# Patient Record
Sex: MALE | Race: BLACK OR AFRICAN AMERICAN | Employment: FULL TIME | URBAN - METROPOLITAN AREA
[De-identification: names, ages, dates, MRNs, and addresses within clinical notes are randomized per-mention and may not be internally consistent; named-entity substitution may affect disease eponyms.]

---

## 2017-01-27 ENCOUNTER — OFFICE VISIT (OUTPATIENT)
Dept: FAMILY MEDICINE CLINIC | Age: 47
End: 2017-01-27

## 2017-01-27 VITALS
SYSTOLIC BLOOD PRESSURE: 128 MMHG | WEIGHT: 233.4 LBS | DIASTOLIC BLOOD PRESSURE: 70 MMHG | RESPIRATION RATE: 16 BRPM | HEART RATE: 80 BPM | HEIGHT: 69 IN | BODY MASS INDEX: 34.57 KG/M2

## 2017-01-27 DIAGNOSIS — F41.9 ANXIETY: ICD-10-CM

## 2017-01-27 DIAGNOSIS — I50.9 CHF (NYHA CLASS III, ACC/AHA STAGE C) (HCC): ICD-10-CM

## 2017-01-27 DIAGNOSIS — I48.19 PERSISTENT ATRIAL FIBRILLATION (HCC): Primary | ICD-10-CM

## 2017-01-27 PROCEDURE — 99213 OFFICE O/P EST LOW 20 MIN: CPT | Performed by: FAMILY MEDICINE

## 2017-01-27 RX ORDER — HYDROXYZINE PAMOATE 25 MG/1
25 CAPSULE ORAL 3 TIMES DAILY PRN
Qty: 60 CAPSULE | Refills: 0 | Status: SHIPPED | OUTPATIENT
Start: 2017-01-27 | End: 2018-02-12 | Stop reason: SDUPTHER

## 2017-01-27 ASSESSMENT — ENCOUNTER SYMPTOMS
GASTROINTESTINAL NEGATIVE: 1
RESPIRATORY NEGATIVE: 1

## 2017-02-24 ENCOUNTER — OFFICE VISIT (OUTPATIENT)
Dept: FAMILY MEDICINE CLINIC | Age: 47
End: 2017-02-24

## 2017-02-24 VITALS
OXYGEN SATURATION: 98 % | DIASTOLIC BLOOD PRESSURE: 72 MMHG | HEIGHT: 69 IN | SYSTOLIC BLOOD PRESSURE: 108 MMHG | BODY MASS INDEX: 34.42 KG/M2 | HEART RATE: 56 BPM | RESPIRATION RATE: 12 BRPM | WEIGHT: 232.4 LBS

## 2017-02-24 DIAGNOSIS — F41.9 ANXIETY: Primary | ICD-10-CM

## 2017-02-24 DIAGNOSIS — I48.91 ATRIAL FIBRILLATION, UNSPECIFIED TYPE (HCC): ICD-10-CM

## 2017-02-24 PROCEDURE — 99213 OFFICE O/P EST LOW 20 MIN: CPT | Performed by: FAMILY MEDICINE

## 2017-02-27 ASSESSMENT — ENCOUNTER SYMPTOMS
RESPIRATORY NEGATIVE: 1
GASTROINTESTINAL NEGATIVE: 1

## 2017-02-28 ENCOUNTER — TELEPHONE (OUTPATIENT)
Dept: FAMILY MEDICINE CLINIC | Age: 47
End: 2017-02-28

## 2017-05-16 ENCOUNTER — OFFICE VISIT (OUTPATIENT)
Dept: FAMILY MEDICINE CLINIC | Age: 47
End: 2017-05-16

## 2017-05-16 VITALS
RESPIRATION RATE: 12 BRPM | DIASTOLIC BLOOD PRESSURE: 62 MMHG | HEIGHT: 69 IN | TEMPERATURE: 98.2 F | OXYGEN SATURATION: 98 % | WEIGHT: 224.9 LBS | HEART RATE: 93 BPM | SYSTOLIC BLOOD PRESSURE: 114 MMHG | BODY MASS INDEX: 33.31 KG/M2

## 2017-05-16 DIAGNOSIS — Z13.1 ENCOUNTER FOR SCREENING FOR DIABETES MELLITUS: ICD-10-CM

## 2017-05-16 DIAGNOSIS — K40.90 LEFT INGUINAL HERNIA: ICD-10-CM

## 2017-05-16 DIAGNOSIS — J00 NASOPHARYNGITIS: Primary | ICD-10-CM

## 2017-05-16 PROCEDURE — 99213 OFFICE O/P EST LOW 20 MIN: CPT | Performed by: NURSE PRACTITIONER

## 2017-05-17 ASSESSMENT — ENCOUNTER SYMPTOMS
SINUS PRESSURE: 0
RHINORRHEA: 1
CHEST TIGHTNESS: 0
ABDOMINAL PAIN: 0
SHORTNESS OF BREATH: 0
NAUSEA: 0
SORE THROAT: 1
COUGH: 0

## 2017-06-08 ENCOUNTER — TELEPHONE (OUTPATIENT)
Dept: FAMILY MEDICINE CLINIC | Age: 47
End: 2017-06-08

## 2017-06-08 DIAGNOSIS — L25.5 RHUS DERMATITIS: Primary | ICD-10-CM

## 2017-06-08 RX ORDER — PREDNISONE 20 MG/1
TABLET ORAL
Qty: 21 TABLET | Refills: 0 | Status: SHIPPED | OUTPATIENT
Start: 2017-06-08 | End: 2017-07-28

## 2017-07-28 ENCOUNTER — HOSPITAL ENCOUNTER (EMERGENCY)
Age: 47
Discharge: HOME | End: 2017-07-28
Payer: MEDICARE

## 2017-07-28 VITALS
RESPIRATION RATE: 16 BRPM | HEIGHT: 69 IN | TEMPERATURE: 98.7 F | BODY MASS INDEX: 34.36 KG/M2 | OXYGEN SATURATION: 98 % | WEIGHT: 232 LBS | HEART RATE: 76 BPM | DIASTOLIC BLOOD PRESSURE: 71 MMHG | SYSTOLIC BLOOD PRESSURE: 144 MMHG

## 2017-07-28 DIAGNOSIS — J06.9 URI WITH COUGH AND CONGESTION: Primary | ICD-10-CM

## 2017-07-28 PROCEDURE — 99212 OFFICE O/P EST SF 10 MIN: CPT

## 2017-07-28 PROCEDURE — 99213 OFFICE O/P EST LOW 20 MIN: CPT | Performed by: NURSE PRACTITIONER

## 2017-07-28 RX ORDER — AMOXICILLIN AND CLAVULANATE POTASSIUM 500; 125 MG/1; MG/1
1 TABLET, FILM COATED ORAL 2 TIMES DAILY
Qty: 14 TABLET | Refills: 0 | Status: SHIPPED | OUTPATIENT
Start: 2017-07-28 | End: 2017-08-04

## 2017-07-28 RX ORDER — PREDNISONE 20 MG/1
20 TABLET ORAL 2 TIMES DAILY
Qty: 10 TABLET | Refills: 0 | Status: SHIPPED | OUTPATIENT
Start: 2017-07-28 | End: 2017-08-02

## 2017-07-28 ASSESSMENT — ENCOUNTER SYMPTOMS
DIARRHEA: 0
EYE DISCHARGE: 0
VOMITING: 0
STRIDOR: 0
BACK PAIN: 0
SHORTNESS OF BREATH: 0
RHINORRHEA: 0
ABDOMINAL PAIN: 0
COUGH: 1
NAUSEA: 0
SORE THROAT: 0
CONSTIPATION: 0
EYE PAIN: 0
WHEEZING: 0
COLOR CHANGE: 0

## 2017-09-13 ENCOUNTER — TELEPHONE (OUTPATIENT)
Dept: FAMILY MEDICINE CLINIC | Age: 47
End: 2017-09-13

## 2018-02-12 ENCOUNTER — OFFICE VISIT (OUTPATIENT)
Dept: FAMILY MEDICINE CLINIC | Age: 48
End: 2018-02-12
Payer: MEDICARE

## 2018-02-12 VITALS
HEART RATE: 76 BPM | BODY MASS INDEX: 36.11 KG/M2 | DIASTOLIC BLOOD PRESSURE: 70 MMHG | SYSTOLIC BLOOD PRESSURE: 122 MMHG | RESPIRATION RATE: 16 BRPM | HEIGHT: 69 IN | WEIGHT: 243.8 LBS

## 2018-02-12 DIAGNOSIS — Z23 INFLUENZA VACCINE NEEDED: ICD-10-CM

## 2018-02-12 DIAGNOSIS — I50.9 CHF (NYHA CLASS III, ACC/AHA STAGE C) (HCC): ICD-10-CM

## 2018-02-12 DIAGNOSIS — Z00.00 WELL ADULT EXAM: Primary | ICD-10-CM

## 2018-02-12 DIAGNOSIS — I34.0 NON-RHEUMATIC MITRAL REGURGITATION: ICD-10-CM

## 2018-02-12 DIAGNOSIS — Z13.1 ENCOUNTER FOR SCREENING FOR DIABETES MELLITUS: ICD-10-CM

## 2018-02-12 DIAGNOSIS — F41.9 ANXIETY: ICD-10-CM

## 2018-02-12 PROCEDURE — 90688 IIV4 VACCINE SPLT 0.5 ML IM: CPT | Performed by: NURSE PRACTITIONER

## 2018-02-12 PROCEDURE — 90471 IMMUNIZATION ADMIN: CPT | Performed by: NURSE PRACTITIONER

## 2018-02-12 PROCEDURE — 99396 PREV VISIT EST AGE 40-64: CPT | Performed by: NURSE PRACTITIONER

## 2018-02-12 RX ORDER — HYDROXYZINE PAMOATE 25 MG/1
25 CAPSULE ORAL 3 TIMES DAILY PRN
Qty: 60 CAPSULE | Refills: 2 | Status: SHIPPED | OUTPATIENT
Start: 2018-02-12 | End: 2019-04-06 | Stop reason: SDUPTHER

## 2018-02-12 ASSESSMENT — PATIENT HEALTH QUESTIONNAIRE - PHQ9
SUM OF ALL RESPONSES TO PHQ QUESTIONS 1-9: 0
2. FEELING DOWN, DEPRESSED OR HOPELESS: 0
SUM OF ALL RESPONSES TO PHQ9 QUESTIONS 1 & 2: 0
1. LITTLE INTEREST OR PLEASURE IN DOING THINGS: 0

## 2018-02-12 ASSESSMENT — ENCOUNTER SYMPTOMS
COUGH: 0
ABDOMINAL PAIN: 0
SHORTNESS OF BREATH: 0
NAUSEA: 0

## 2018-02-12 NOTE — PROGRESS NOTES
then come off completely  Hydroxyzine PRN  Screening labs through [facility] discussed  FLU in office  RTO in 1 year

## 2018-03-17 ENCOUNTER — HOSPITAL ENCOUNTER (OUTPATIENT)
Age: 48
Discharge: HOME | End: 2018-03-17
Payer: MEDICARE

## 2018-03-17 LAB
ALBUMIN SERPL-MCNC: 4.8 G/DL (ref 3.5–5.1)
ALP BLD-CCNC: 99 U/L (ref 38–126)
ALT SERPL-CCNC: 21 U/L (ref 11–66)
ANION GAP SERPL CALCULATED.3IONS-SCNC: 14 MEQ/L (ref 8–16)
AST SERPL-CCNC: 19 U/L (ref 5–40)
BILIRUB SERPL-MCNC: 0.5 MG/DL (ref 0.3–1.2)
BILIRUBIN DIRECT: < 0.2 MG/DL (ref 0–0.3)
BUN BLDV-MCNC: 10 MG/DL (ref 7–22)
CALCIUM SERPL-MCNC: 10 MG/DL (ref 8.5–10.5)
CHLORIDE BLD-SCNC: 101 MEQ/L (ref 98–111)
CHOLESTEROL, TOTAL: 135 MG/DL (ref 100–199)
CO2: 26 MEQ/L (ref 23–33)
CREAT SERPL-MCNC: 0.9 MG/DL (ref 0.4–1.2)
GFR SERPL CREATININE-BSD FRML MDRD: > 90 ML/MIN/1.73M2
GLUCOSE BLD-MCNC: 101 MG/DL (ref 70–108)
HDLC SERPL-MCNC: 54 MG/DL
LDL CHOLESTEROL CALCULATED: 66 MG/DL
POTASSIUM SERPL-SCNC: 4.6 MEQ/L (ref 3.5–5.2)
SODIUM BLD-SCNC: 141 MEQ/L (ref 135–145)
TOTAL PROTEIN: 8.1 G/DL (ref 6.1–8)
TRIGL SERPL-MCNC: 73 MG/DL (ref 0–199)

## 2018-03-17 PROCEDURE — 36415 COLL VENOUS BLD VENIPUNCTURE: CPT

## 2018-03-17 PROCEDURE — 82248 BILIRUBIN DIRECT: CPT

## 2018-03-17 PROCEDURE — 80053 COMPREHEN METABOLIC PANEL: CPT

## 2018-03-17 PROCEDURE — 80061 LIPID PANEL: CPT

## 2019-04-06 ENCOUNTER — HOSPITAL ENCOUNTER (EMERGENCY)
Age: 49
Discharge: HOME | End: 2019-04-06
Payer: COMMERCIAL

## 2019-04-06 VITALS
OXYGEN SATURATION: 97 % | SYSTOLIC BLOOD PRESSURE: 100 MMHG | WEIGHT: 230 LBS | HEART RATE: 96 BPM | HEIGHT: 69 IN | BODY MASS INDEX: 34.07 KG/M2 | TEMPERATURE: 98.4 F | RESPIRATION RATE: 19 BRPM | DIASTOLIC BLOOD PRESSURE: 71 MMHG

## 2019-04-06 DIAGNOSIS — F41.9 ANXIETY: ICD-10-CM

## 2019-04-06 DIAGNOSIS — I48.0 PAROXYSMAL ATRIAL FIBRILLATION (HCC): Primary | ICD-10-CM

## 2019-04-06 LAB
ALBUMIN SERPL-MCNC: 4.2 G/DL (ref 3.5–5.1)
ALP BLD-CCNC: 75 U/L (ref 38–126)
ALT SERPL-CCNC: 27 U/L (ref 11–66)
ANION GAP SERPL CALCULATED.3IONS-SCNC: 12 MEQ/L (ref 8–16)
AST SERPL-CCNC: 22 U/L (ref 5–40)
BASOPHILS # BLD: 1 %
BASOPHILS ABSOLUTE: 0.1 THOU/MM3 (ref 0–0.1)
BILIRUB SERPL-MCNC: 0.4 MG/DL (ref 0.3–1.2)
BUN BLDV-MCNC: 19 MG/DL (ref 7–22)
CALCIUM SERPL-MCNC: 9 MG/DL (ref 8.5–10.5)
CHLORIDE BLD-SCNC: 106 MEQ/L (ref 98–111)
CO2: 22 MEQ/L (ref 23–33)
CREAT SERPL-MCNC: 0.9 MG/DL (ref 0.4–1.2)
EKG ATRIAL RATE: 84 BPM
EKG Q-T INTERVAL: 324 MS
EKG QRS DURATION: 74 MS
EKG QTC CALCULATION (BAZETT): 446 MS
EKG R AXIS: -17 DEGREES
EKG T AXIS: 6 DEGREES
EKG VENTRICULAR RATE: 114 BPM
EOSINOPHIL # BLD: 1.8 %
EOSINOPHILS ABSOLUTE: 0.1 THOU/MM3 (ref 0–0.4)
ERYTHROCYTE [DISTWIDTH] IN BLOOD BY AUTOMATED COUNT: 12.9 % (ref 11.5–14.5)
ERYTHROCYTE [DISTWIDTH] IN BLOOD BY AUTOMATED COUNT: 43.1 FL (ref 35–45)
GFR SERPL CREATININE-BSD FRML MDRD: > 90 ML/MIN/1.73M2
GLUCOSE BLD-MCNC: 99 MG/DL (ref 70–108)
HCT VFR BLD CALC: 46.9 % (ref 42–52)
HEMOGLOBIN: 15.9 GM/DL (ref 14–18)
IMMATURE GRANS (ABS): 0.02 THOU/MM3 (ref 0–0.07)
IMMATURE GRANULOCYTES: 0.3 %
LYMPHOCYTES # BLD: 25.3 %
LYMPHOCYTES ABSOLUTE: 2 THOU/MM3 (ref 1–4.8)
MCH RBC QN AUTO: 31 PG (ref 26–33)
MCHC RBC AUTO-ENTMCNC: 33.9 GM/DL (ref 32.2–35.5)
MCV RBC AUTO: 91.4 FL (ref 80–94)
MONOCYTES # BLD: 6.5 %
MONOCYTES ABSOLUTE: 0.5 THOU/MM3 (ref 0.4–1.3)
NUCLEATED RED BLOOD CELLS: 0 /100 WBC
OSMOLALITY CALCULATION: 281.7 MOSMOL/KG (ref 275–300)
PLATELET # BLD: 304 THOU/MM3 (ref 130–400)
PMV BLD AUTO: 9.9 FL (ref 9.4–12.4)
POTASSIUM SERPL-SCNC: 4.8 MEQ/L (ref 3.5–5.2)
PRO-BNP: 209.4 PG/ML (ref 0–450)
RBC # BLD: 5.13 MILL/MM3 (ref 4.7–6.1)
SEG NEUTROPHILS: 65.1 %
SEGMENTED NEUTROPHILS ABSOLUTE COUNT: 5.1 THOU/MM3 (ref 1.8–7.7)
SODIUM BLD-SCNC: 140 MEQ/L (ref 135–145)
TOTAL PROTEIN: 7.2 G/DL (ref 6.1–8)
TSH SERPL DL<=0.05 MIU/L-ACNC: 2.76 UIU/ML (ref 0.4–4.2)
WBC # BLD: 7.8 THOU/MM3 (ref 4.8–10.8)

## 2019-04-06 PROCEDURE — 93010 ELECTROCARDIOGRAM REPORT: CPT | Performed by: INTERNAL MEDICINE

## 2019-04-06 PROCEDURE — 99285 EMERGENCY DEPT VISIT HI MDM: CPT

## 2019-04-06 PROCEDURE — 83880 ASSAY OF NATRIURETIC PEPTIDE: CPT

## 2019-04-06 PROCEDURE — 36415 COLL VENOUS BLD VENIPUNCTURE: CPT

## 2019-04-06 PROCEDURE — 85025 COMPLETE CBC W/AUTO DIFF WBC: CPT

## 2019-04-06 PROCEDURE — 6370000000 HC RX 637 (ALT 250 FOR IP): Performed by: EMERGENCY MEDICINE

## 2019-04-06 PROCEDURE — 80053 COMPREHEN METABOLIC PANEL: CPT

## 2019-04-06 PROCEDURE — 93005 ELECTROCARDIOGRAM TRACING: CPT | Performed by: EMERGENCY MEDICINE

## 2019-04-06 PROCEDURE — 84443 ASSAY THYROID STIM HORMONE: CPT

## 2019-04-06 RX ORDER — HYDROXYZINE PAMOATE 25 MG/1
25 CAPSULE ORAL 3 TIMES DAILY PRN
Qty: 60 CAPSULE | Refills: 0 | Status: SHIPPED | OUTPATIENT
Start: 2019-04-06 | End: 2019-04-17 | Stop reason: SDUPTHER

## 2019-04-06 RX ORDER — DILTIAZEM HYDROCHLORIDE 120 MG/1
120 CAPSULE, COATED, EXTENDED RELEASE ORAL DAILY
Status: DISCONTINUED | OUTPATIENT
Start: 2019-04-06 | End: 2019-04-06 | Stop reason: HOSPADM

## 2019-04-06 RX ADMIN — DILTIAZEM HYDROCHLORIDE 120 MG: 120 CAPSULE, EXTENDED RELEASE ORAL at 10:29

## 2019-04-06 ASSESSMENT — ENCOUNTER SYMPTOMS
DIARRHEA: 0
BACK PAIN: 0
SHORTNESS OF BREATH: 0
WHEEZING: 0
ABDOMINAL PAIN: 0
CONSTIPATION: 0
VOMITING: 0
RHINORRHEA: 0
BLOOD IN STOOL: 0
SORE THROAT: 0
NAUSEA: 0
COUGH: 0

## 2019-04-06 NOTE — ED PROVIDER NOTES
4/6/2019 10:27 AM      CONTINUE these medications which have NOT CHANGED    Details   ALBUTEROL IN Inhale into the lungsHistorical Med      BENZONATATE PO Take by mouthHistorical Med      losartan (COZAAR) 25 MG tablet Take 50 mg by mouth daily Historical Med      aspirin 81 MG chewable tablet Take 1 tablet by mouth daily, Disp-30 tablet, R-3      nitroGLYCERIN (NITROSTAT) 0.4 MG SL tablet Dissolve 1 tablet under tongue for chest pain and repeat every 5 min up to max of 3 total doses. If no relief after 3 doses call 911, Disp-25 tablet, R-3      metoprolol tartrate (LOPRESSOR) 50 MG tablet Take 1 tablet by mouth 2 times daily, Disp-60 tablet, R-3      potassium chloride (KLOR-CON M) 20 MEQ extended release tablet Take 1 tablet by mouth daily, Disp-60 tablet, R-3      diltiazem (CARDIZEM) 90 MG tablet Take 2 tablets by mouth daily, Disp-120 tablet, R-3      bumetanide (BUMEX) 2 MG tablet Take 1 tablet by mouth daily, Disp-30 tablet, R-3             ALLERGIES     has No Known Allergies. FAMILY HISTORY      indicated that the status of his father is unknown. He indicated that the status of his maternal grandmother is unknown. He indicated that the status of his maternal aunt is unknown.   family history includes Cancer in his maternal aunt; Heart Disease in his father; Heart Failure in his maternal grandmother. SOCIAL HISTORY      reports that he has never smoked. He has never used smokeless tobacco. He reports that he drinks alcohol. He reports that he has current or past drug history. Drug: Marijuana. PHYSICAL EXAM     INITIAL VITALS:  height is 5' 9\" (1.753 m) and weight is 230 lb (104.3 kg). His oral temperature is 98.4 °F (36.9 °C). His blood pressure is 100/71 and his pulse is 96. His respiration is 19 and oxygen saturation is 97%. Physical Exam   Constitutional: He is oriented to person, place, and time. He appears well-developed and well-nourished. HENT:   Head: Normocephalic and atraumatic. the ED for an irregular heart rate. Patient presented to the ED in no acute distress. He was placed on a cardiac monitor and an EKG was completed without ischemic changes but did show a-fib. Patient was in a controlled rate while in the ED. He appeared in no apparent distress. No chest pain or shortness of breath. Labs were completed and reassuring. I consulted Dr. Patsy Flores (Cardiology) who advised starting the patient on Eliquis and 81mg ASA, also requested that I make sure the patient is taking his Cardizem and metoprolol. Patient was updated on consult with Dr. Patsy Flores. He has not been taking his cardizem but has it at home and will restart it. Patient was instructed to follow up with Dr. Patsy Flores (Cardiology) next week for recheck. The patient was amenable with all discharge and follow up instructions. All questions were addressed and answered. Return precautions were given for new or worsening symptoms. CRITICAL CARE:   None      CONSULTS:  Dr. Patsy Flores (Cardiology)    PROCEDURES:  None    FINAL IMPRESSION     1. Paroxysmal atrial fibrillation (HCC)    2. Anxiety          DISPOSITION/PLAN   Discharged     PATIENT REFERRED TO:  [facility], [address] [place] [address]  [phone]      As needed    Duyen Figueroa, [address] [place] [address]     Schedule an appointment as soon as possible for a visit on 4/8/2019        DISCHARGE MEDICATIONS:  Discharge Medication List as of 4/6/2019 10:27 AM      START taking these medications    Details   apixaban (ELIQUIS) 5 MG TABS tablet Take 1 tablet by mouth 2 times daily, Disp-60 tablet, R-0Print             (Please note that portions of this note were completed with a voice recognition program.Efforts weremade to edit the dictations but occasionally words are mis-transcribed.)    The patient was given an opportunity to see the EmergencyAttending.  Thepatient voiced understanding that I was a Mid-Level Provider and was in agreement with being seen independently by myself. Scribe:  Michelle Velásquez 4/6/19 9:35 AM Scribing for and in thepresence of JOSE Boston. Signed by: Angela Felder, 04/06/19 12:47 PM    Provider:  I personally performed the services described in the documentation, reviewed and edited the documentation which was dictated to the scribe in my presence, and it accurately records my wordsand actions.     Radha Andres CNP 4/6/19 12:47 PM       ISABEL Cristina - JOSE  04/06/19 9458

## 2019-04-06 NOTE — ED NOTES
Patient presents to the ED with complaint of 'not feeling right'. Patient reports that his heart felt irregular and checked his pulse this morning and it felt irregular by palpitation. Patient states he has history of afib and was supposed to be cardioverted in 2017 but then converted on his own. Denies SOB, nausea or emesis. EKG obtained and monitor applied.       Ashley Galvin RN  04/06/19 5515

## 2019-04-06 NOTE — ED NOTES
Awaiting medication for discharge. Patient updated on plan of care with verbalized understanding.       Deirdre Russ RN  04/06/19 6550

## 2019-04-08 ENCOUNTER — TELEPHONE (OUTPATIENT)
Dept: FAMILY MEDICINE CLINIC | Age: 49
End: 2019-04-08

## 2019-04-17 ENCOUNTER — OFFICE VISIT (OUTPATIENT)
Dept: FAMILY MEDICINE CLINIC | Age: 49
End: 2019-04-17
Payer: COMMERCIAL

## 2019-04-17 VITALS
HEIGHT: 69 IN | BODY MASS INDEX: 34.39 KG/M2 | SYSTOLIC BLOOD PRESSURE: 124 MMHG | HEART RATE: 76 BPM | RESPIRATION RATE: 16 BRPM | WEIGHT: 232.2 LBS | DIASTOLIC BLOOD PRESSURE: 70 MMHG

## 2019-04-17 DIAGNOSIS — I48.0 PAROXYSMAL ATRIAL FIBRILLATION (HCC): Primary | ICD-10-CM

## 2019-04-17 DIAGNOSIS — I50.9 CHF (NYHA CLASS III, ACC/AHA STAGE C) (HCC): ICD-10-CM

## 2019-04-17 DIAGNOSIS — I34.0 NON-RHEUMATIC MITRAL REGURGITATION: ICD-10-CM

## 2019-04-17 DIAGNOSIS — F41.9 ANXIETY: ICD-10-CM

## 2019-04-17 PROCEDURE — 99214 OFFICE O/P EST MOD 30 MIN: CPT | Performed by: FAMILY MEDICINE

## 2019-04-17 RX ORDER — HYDROXYZINE PAMOATE 25 MG/1
25 CAPSULE ORAL 3 TIMES DAILY PRN
Qty: 60 CAPSULE | Refills: 2 | Status: ON HOLD | OUTPATIENT
Start: 2019-04-17 | End: 2021-11-24 | Stop reason: ALTCHOICE

## 2019-04-17 ASSESSMENT — ENCOUNTER SYMPTOMS
RESPIRATORY NEGATIVE: 1
GASTROINTESTINAL NEGATIVE: 1

## 2019-04-17 ASSESSMENT — PATIENT HEALTH QUESTIONNAIRE - PHQ9
2. FEELING DOWN, DEPRESSED OR HOPELESS: 0
SUM OF ALL RESPONSES TO PHQ QUESTIONS 1-9: 0
SUM OF ALL RESPONSES TO PHQ9 QUESTIONS 1 & 2: 0
SUM OF ALL RESPONSES TO PHQ QUESTIONS 1-9: 0
1. LITTLE INTEREST OR PLEASURE IN DOING THINGS: 0

## 2019-04-17 NOTE — PROGRESS NOTES
Visit Information    Have you changed or started any medications since your last visit including any over-the-counter medicines, vitamins, or herbal medicines? no   Are you having any side effects from any of your medications? -  no  Have you stopped taking any of your medications? Is so, why? -  no    Have you seen any other physician or provider since your last visit? Yes - Records Obtained  Have you had any other diagnostic tests since your last visit? Yes - Records Obtained  Have you been seen in the emergency room and/or had an admission to a hospital since we last saw you? Yes - Records Obtained  Have you had your routine dental cleaning in the past 6 months? no    Have you activated your [employer] account? If not, what are your barriers?  Yes     Patient Care Team:  Megan Crawley DO as PCP - General (Family Medicine)  ISABEL Vann - CNP as PCP - [facility] Attributed Provider  Daniel Chisholm MD as Consulting Physician (Cardiology)    Medical History Review  Past Medical, Family, and Social History reviewed and does contribute to the patient presenting condition    Health Maintenance   Topic Date Due    Pneumococcal 0-64 years Vaccine (1 of 1 - PPSV23) 08/13/1978    HIV screen  08/13/1987    DTaP/Tdap/Td vaccine (1 - Tdap) 08/13/1991    Flu vaccine (Season Ended) 09/01/2019    Potassium monitoring  04/06/2020    Creatinine monitoring  04/06/2020    Lipid screen  03/17/2023

## 2019-04-17 NOTE — PROGRESS NOTES
Subjective:      Patient ID: Nimisha Armstrong is a 57 y.o. male. HPI:    Chief Complaint   Patient presents with    ED Follow-up    Atrial Fibrillation     seeing Dr Chandler Kaur today for follow-up    Anxiety     med refill     ED follow up. Was seen in the ED on 4/6/19. MDM:  The patient was seen and evaluated in the ED for an irregular heart rate. Patient presented to the ED in no acute distress. He was placed on a cardiac monitor and an EKG was completed without ischemic changes but did show a-fib. Patient was in a controlled rate while in the ED. He appeared in no apparent distress. No chest pain or shortness of breath. Labs were completed and reassuring. I consulted Dr. Chandler Kaur (Cardiology) who advised starting the patient on Eliquis and 81mg ASA, also requested that I make sure the patient is taking his Cardizem and metoprolol. Patient was updated on consult with Dr. Chandler Kaur. He has not been taking his cardizem but has it at home and will restart it. Patient was instructed to follow up with Dr. Chandler Kaur (Cardiology) next week for recheck. The patient was amenable with all discharge and follow up instructions. All questions were addressed and answered. Return precautions were given for new or worsening symptoms. Pt was off his Cardizem for the last few months, this was stopped Dr. Chandler Kaur. Has f/u with Dr. Chandler Kaur later today. BP looks good today. BP Readings from Last 3 Encounters:   04/17/19 124/70   04/06/19 100/71   02/12/18 122/70     Down 10 lbs from last year. Hx of CHF, Bumex prn. Wt Readings from Last 3 Encounters:   04/17/19 232 lb 3.2 oz (105.3 kg)   04/06/19 230 lb (104.3 kg)   02/12/18 243 lb 12.8 oz (110.6 kg)     Hx of anxiety, would like have refill Atarax to take prn.       Patient Active Problem List   Diagnosis    Persistent atrial fibrillation (HCC)    Episodic headache    Cerebrovascular accident (CVA) due to embolism of left middle cerebral artery (HCC)    CHF (NYHA class III, ACC/AHA stage C) ([facility]    MR (mitral regurgitation)     Past Surgical History:   Procedure Laterality Date    TONSILLECTOMY       Prior to Admission medications    Medication Sig Start Date End Date Taking? Authorizing Provider   apixaban (ELIQUIS) 5 MG TABS tablet Take 1 tablet by mouth 2 times daily 4/6/19  Yes Hosie Spurling, APRN - CNP   hydrOXYzine (VISTARIL) 25 MG capsule Take 1 capsule by mouth 3 times daily as needed for Anxiety 4/6/19  Yes Hosie Spurling, APRN - CNP   losartan (COZAAR) 25 MG tablet Take 50 mg by mouth daily    Yes Historical Provider, MD   aspirin 81 MG chewable tablet Take 1 tablet by mouth daily 11/22/16  Yes Maria Gaviria MD   nitroGLYCERIN (NITROSTAT) 0.4 MG SL tablet Dissolve 1 tablet under tongue for chest pain and repeat every 5 min up to max of 3 total doses. If no relief after 3 doses call 911 11/22/16  Yes Maria Gaviria MD   metoprolol tartrate (LOPRESSOR) 50 MG tablet Take 1 tablet by mouth 2 times daily 11/22/16  Yes Maria Gaviria MD   diltiazem (CARDIZEM) 90 MG tablet Take 2 tablets by mouth daily 11/22/16  Yes Maria Gaviria MD   potassium chloride (KLOR-CON M) 20 MEQ extended release tablet Take 1 tablet by mouth daily 11/22/16  Yes Maria Gaviria MD   bumetanide (BUMEX) 2 MG tablet Take 1 tablet by mouth daily 11/22/16   Maria Gaviria MD         Review of Systems   Constitutional: Negative. HENT: Negative. Respiratory: Negative. Cardiovascular: Negative. Gastrointestinal: Negative. Musculoskeletal: Negative. All other systems reviewed and are negative. Objective:   Physical Exam   Constitutional: He is oriented to person, place, and time. He appears well-developed and well-nourished. HENT:   Head: Normocephalic and atraumatic. Right Ear: Tympanic membrane normal.   Left Ear: Tympanic membrane normal.   Mouth/Throat: Oropharynx is clear and moist and mucous membranes are normal.   Neck: Carotid bruit is not present. Cardiovascular: Normal rate and normal heart sounds. An irregularly irregular rhythm present. No murmur heard. Pulmonary/Chest: Effort normal and breath sounds normal.   Abdominal: Soft. Bowel sounds are normal.   Musculoskeletal: He exhibits no edema. Neurological: He is alert and oriented to person, place, and time. Skin: Skin is warm and dry. Psychiatric: He has a normal mood and affect. His behavior is normal.   Nursing note and vitals reviewed. Assessment:       Diagnosis Orders   1. Paroxysmal atrial fibrillation (HCC)     2. Anxiety  hydrOXYzine (VISTARIL) 25 MG capsule   3. CHF (NYHA class III, ACC/AHA stage C) ([facility]     4. Non-rheumatic mitral regurgitation             Plan:      -  ED reports reviewed  -  Chronic medical problems stable  -  Continue current medications  -  Follow up with specialists as scheduled, has appt with Dr. Edna Hunt later today  -  Refill hydroxyzine to take prn  -  RTO prn    Quality & Risk Score Accuracy    Visit Dx:  I50.9 - CHF (NYHA class III, ACC/AHA stage C) ([facility])  Assessment and plan:  Stable based upon symptoms and exam. Continue current treatment plan and follow up at least yearly. Visit Dx:  I48.1 - Persistent atrial fibrillation (HCC)  Assessment and plan:  Stable based upon symptoms and exam. Continue current treatment plan and follow up at least yearly.   Last edited 04/17/19 08:37 EDT by Yunior Roca, DO Yunior Roca DO

## 2019-04-18 RX ORDER — ATORVASTATIN CALCIUM 40 MG/1
40 TABLET, FILM COATED ORAL DAILY
Status: ON HOLD | COMMUNITY
End: 2021-11-24

## 2019-04-18 NOTE — PROGRESS NOTES
NPO after midnight  [employer] and drivers license  Wear comfortable clean clothing  Do not bring jewelry  Shower night before and morning of surgery with a liquid antibacterial soap  Bring  medication  Follow all instructions given by your physician   needed at discharge  Call [phone] for any questions

## 2019-04-19 ENCOUNTER — HOSPITAL ENCOUNTER (OUTPATIENT)
Dept: INPATIENT UNIT | Age: 49
Discharge: HOME | End: 2019-04-19
Attending: INTERNAL MEDICINE | Admitting: INTERNAL MEDICINE
Payer: COMMERCIAL

## 2019-04-19 VITALS
HEIGHT: 69 IN | BODY MASS INDEX: 34.36 KG/M2 | TEMPERATURE: 98.3 F | WEIGHT: 232 LBS | RESPIRATION RATE: 14 BRPM | OXYGEN SATURATION: 95 % | DIASTOLIC BLOOD PRESSURE: 63 MMHG | HEART RATE: 68 BPM | SYSTOLIC BLOOD PRESSURE: 94 MMHG

## 2019-04-19 LAB
ALBUMIN SERPL-MCNC: 4 G/DL (ref 3.5–5.1)
ALP BLD-CCNC: 72 U/L (ref 38–126)
ALT SERPL-CCNC: 27 U/L (ref 11–66)
ANION GAP SERPL CALCULATED.3IONS-SCNC: 11 MEQ/L (ref 8–16)
AST SERPL-CCNC: 18 U/L (ref 5–40)
BILIRUB SERPL-MCNC: 0.4 MG/DL (ref 0.3–1.2)
BUN BLDV-MCNC: 17 MG/DL (ref 7–22)
CALCIUM SERPL-MCNC: 8.7 MG/DL (ref 8.5–10.5)
CHLORIDE BLD-SCNC: 106 MEQ/L (ref 98–111)
CO2: 23 MEQ/L (ref 23–33)
CREAT SERPL-MCNC: 1 MG/DL (ref 0.4–1.2)
EKG ATRIAL RATE: 45 BPM
EKG ATRIAL RATE: 60 BPM
EKG P AXIS: 55 DEGREES
EKG P-R INTERVAL: 178 MS
EKG Q-T INTERVAL: 382 MS
EKG Q-T INTERVAL: 422 MS
EKG QRS DURATION: 78 MS
EKG QRS DURATION: 84 MS
EKG QTC CALCULATION (BAZETT): 400 MS
EKG QTC CALCULATION (BAZETT): 422 MS
EKG R AXIS: -3 DEGREES
EKG R AXIS: 3 DEGREES
EKG T AXIS: 18 DEGREES
EKG T AXIS: 5 DEGREES
EKG VENTRICULAR RATE: 60 BPM
EKG VENTRICULAR RATE: 66 BPM
ERYTHROCYTE [DISTWIDTH] IN BLOOD BY AUTOMATED COUNT: 12.9 % (ref 11.5–14.5)
ERYTHROCYTE [DISTWIDTH] IN BLOOD BY AUTOMATED COUNT: 43 FL (ref 35–45)
GFR SERPL CREATININE-BSD FRML MDRD: > 90 ML/MIN/1.73M2
GLUCOSE BLD-MCNC: 100 MG/DL (ref 70–108)
HCT VFR BLD CALC: 43.2 % (ref 42–52)
HEMOGLOBIN: 14.5 GM/DL (ref 14–18)
MCH RBC QN AUTO: 30.9 PG (ref 26–33)
MCHC RBC AUTO-ENTMCNC: 33.6 GM/DL (ref 32.2–35.5)
MCV RBC AUTO: 91.9 FL (ref 80–94)
PLATELET # BLD: 228 THOU/MM3 (ref 130–400)
PMV BLD AUTO: 9.9 FL (ref 9.4–12.4)
POTASSIUM SERPL-SCNC: 4.4 MEQ/L (ref 3.5–5.2)
RBC # BLD: 4.7 MILL/MM3 (ref 4.7–6.1)
SODIUM BLD-SCNC: 140 MEQ/L (ref 135–145)
TOTAL PROTEIN: 6.7 G/DL (ref 6.1–8)
WBC # BLD: 6.5 THOU/MM3 (ref 4.8–10.8)

## 2019-04-19 PROCEDURE — 36415 COLL VENOUS BLD VENIPUNCTURE: CPT

## 2019-04-19 PROCEDURE — 2700000000 HC OXYGEN THERAPY PER DAY

## 2019-04-19 PROCEDURE — 85027 COMPLETE CBC AUTOMATED: CPT

## 2019-04-19 PROCEDURE — 2580000003 HC RX 258: Performed by: INTERNAL MEDICINE

## 2019-04-19 PROCEDURE — 93010 ELECTROCARDIOGRAM REPORT: CPT | Performed by: INTERNAL MEDICINE

## 2019-04-19 PROCEDURE — 94761 N-INVAS EAR/PLS OXIMETRY MLT: CPT

## 2019-04-19 PROCEDURE — 2500000003 HC RX 250 WO HCPCS

## 2019-04-19 PROCEDURE — 92960 CARDIOVERSION ELECTRIC EXT: CPT | Performed by: INTERNAL MEDICINE

## 2019-04-19 PROCEDURE — 6360000002 HC RX W HCPCS

## 2019-04-19 PROCEDURE — 93005 ELECTROCARDIOGRAM TRACING: CPT | Performed by: INTERNAL MEDICINE

## 2019-04-19 PROCEDURE — 2709999900 HC NON-CHARGEABLE SUPPLY

## 2019-04-19 PROCEDURE — 80053 COMPREHEN METABOLIC PANEL: CPT

## 2019-04-19 RX ORDER — SODIUM CHLORIDE 0.9 % (FLUSH) 0.9 %
10 SYRINGE (ML) INJECTION PRN
Status: DISCONTINUED | OUTPATIENT
Start: 2019-04-19 | End: 2019-04-19 | Stop reason: HOSPADM

## 2019-04-19 RX ORDER — SODIUM CHLORIDE 9 MG/ML
INJECTION, SOLUTION INTRAVENOUS CONTINUOUS
Status: DISCONTINUED | OUTPATIENT
Start: 2019-04-19 | End: 2019-04-19 | Stop reason: HOSPADM

## 2019-04-19 RX ORDER — DILTIAZEM HYDROCHLORIDE 60 MG/1
60 TABLET, FILM COATED ORAL 2 TIMES DAILY
COMMUNITY
End: 2022-05-24 | Stop reason: SDUPTHER

## 2019-04-19 RX ORDER — SODIUM CHLORIDE 0.9 % (FLUSH) 0.9 %
10 SYRINGE (ML) INJECTION EVERY 12 HOURS SCHEDULED
Status: DISCONTINUED | OUTPATIENT
Start: 2019-04-19 | End: 2019-04-19 | Stop reason: HOSPADM

## 2019-04-19 RX ADMIN — SODIUM CHLORIDE: 9 INJECTION, SOLUTION INTRAVENOUS at 07:29

## 2019-04-19 NOTE — OP NOTE
[address] [address]  Sedation/Analgesia Post Sedation Record      Pt Name: Jackson Clement  MRN: [id number]  YOB: 1970  Procedure Performed By: Esteban Heard MD  Primary Care Physician: Michi Sharma DO    POST-PROCEDURE    Physician: Esteban Heard MD    Procedure Performed:  Cardioversion    Sedation/Anesthesia:  intravenous sedation    Estimated Blood Loss:  none    Specimens Removed:  None    Complications:  None     Post Procedure Diagnosis/Findings:  Atrial Fibrillation    Recommendations:  Cary Garrison MD  Electronically signed 4/19/2019 at 8:42 AM

## 2019-04-19 NOTE — PROCEDURES
[address] [place] [address]                            CARDIAC CATHETERIZATION    PATIENT NAME: Severiano Frohlich                     :        1970  MED REC NO:   [id number]                           ROOM:       0017  ACCOUNT NO:   [de-identified]                           ADMIT DATE: 2019  PROVIDER:     Jayne Litten. MARNI [place]:  2019    CARDIOVERSION PROCEDURE    INDICATION FOR PROCEDURE:  This is a patient who is a 61-year-old  gentleman, who has been in atrial fibrillation. The patient has  prolonged pauses, has been antiarrhythmic. The patient has been  anticoagulated. The patient continued atrial fib. Continued to be  symptomatic. Admitted for a cardioversion. The patient understands the  procedure, benefits, risks and agrees to have it done. PROCEDURE PERFORMED:  1.  IV conscious sedation. The patient was given IV conscious sedation  in incremental dosages by the circulating cath lab RN, monitored by the  cath lab monitor tech under my supervision. The procedure started at  8:20 a.m. and finished at 8:40 a.m. No acute complications. The  patient was given Narcan and Romazicon in the recovery. 2.  Cardioversion:  The patient was heavily sedated, and he was  cardioverted utilizing synchronized current at 360 joules. He was  converted to a sinus rhythm. The patient did receive reversal of  sedation in the recovery time. The patient has no acute complications from the procedure. EKG showed  sinus rhythm. IMPRESSION:  Successful electrical cardioversion. RECOMMENDATION:  Continue antiarrhythmic medication. The patient will  be seen by the EP specialist.  The patient will be seen in less than  four weeks for a stress test.  The patient was explained the importance  of taking the anticoagulation for a minimum of four weeks postprocedure,  needing to be in sinus rhythm.         RAPHAEL JUÁREZ Ashley Hood M.D.    D: 04/19/2019 8:46:36       T: 04/19/2019 9:15:05     [id number]  Job#: [id number]     Doc#: [id number]    CC:  Ash Miller M.D.

## 2019-04-19 NOTE — PROGRESS NOTES
Resumed care of patient from cath lab, alert and orientated, able to swallow water without incident, plan of care discussed with patient and patient's wife, verbal understanding received by both parties, food ordered.

## 2019-04-19 NOTE — FLOWSHEET NOTE
0837-Patient prepped for cardioversion with Dr. Iman Key. Respiratory here and timeout performed. See flowsheet. 0838-Versed 4 mg IV and Fentanyl 100 mcg IV given by ROBERT Dunne RN. /39-72-%   0840-Versed 2 mg IV and Fentanyl 25 mcg IV given by ROBERT Dunne RN as ordered by Dr. Iman Key  0842-Patient sleepy and Cardioverted at 360 J per Dr. Iman Key and converted from atrial fib to SR.  4692- Narcan 0.4 mg IV and Romazicon 0.4 mg IV given to reverse sedation by ROBERT Dunne RN.  0846-Patient awake and does not remember procedure. 0848-EKG completed. 0850-Family at bedside and report given to LOKESH Pulliam RN.  Renata Herrmann turned over to LOKESH Pulliam RN.

## 2019-04-19 NOTE — H&P
[facility]   Sedation/Analgesia History and Physical    Pt Name: Edison Fiore  MRN: [id number]  YOB: 1970  Provider Performing Procedure: Oanh Gu MD  Primary Care Physician: Lisset Leo DO      [facility]-Pro[facility][id number] atrial fib  Consent: I have discussed with the patient and/or the patient representative the indication, alternatives, and the possible risks and/or complications of the planned procedure and the anesthesia methods. The patient and/or representative appear to understand and agree to proceed. Medical History:   has a past medical history of Atrial fibrillation ([facility] [address] Cerebral artery occlusion with cerebral infarction ([facility] CHF (NYHA class III, ACC/AHA stage C) ([facility] and Episodic headache. .    Surgical History:     has a past surgical history that includes Tonsillectomy and Cardiac catheterization (2016). Allergies: Allergies as of 04/19/2019    (No Known Allergies)       Medications:   Coumadin use last 5 days:  No  Antiplatelet drug therapy use last 5 days:  Yes  Other anticoagulant use last 5 days:  No   sodium chloride flush  10 mL Intravenous 2 times per day     Prior to Admission medications    Medication Sig Start Date End Date Taking?  Authorizing Provider   diltiazem (CARDIZEM) 60 MG tablet Take 60 mg by mouth 2 times daily   Yes Historical Provider, MD   atorvastatin (LIPITOR) 40 MG tablet Take 40 mg by mouth daily   Yes Historical Provider, MD   hydrOXYzine (VISTARIL) 25 MG capsule Take 1 capsule by mouth 3 times daily as needed for Anxiety 4/17/19  Yes Lisset Leo DO   apixaban (ELIQUIS) 5 MG TABS tablet Take 1 tablet by mouth 2 times daily 4/6/19  Yes ISABEL Morris - CNP   losartan (COZAAR) 25 MG tablet Take 50 mg by mouth daily    Yes Historical Provider, MD   aspirin 81 MG chewable tablet Take 1 tablet by mouth daily 11/22/16  Yes Oanh Gu MD   nitroGLYCERIN (NITROSTAT) 0.4 MG

## 2019-04-19 NOTE — FLOWSHEET NOTE
Verbalized understanding of discharge instructions and was taken per w/c to discharge door where family waited with auto. Scribe Attestation (For Scribes USE Only)... Scribe Attestation (For Scribes USE Only).../Attending Attestation (For Attendings USE Only)...

## 2019-04-19 NOTE — PROGRESS NOTES
Patient admitted to 2E17  Ambulatory for Cardioversion. Patient NPO. Patient accompanied by wife. Vital signs obtained. Assessment and data collection intiated. Oriented to room. Policies and procedures for 2E explained. All questions answered with no further questions at this time. Fall prevention and safety precautions discussed with patient.

## 2019-04-22 ENCOUNTER — TELEPHONE (OUTPATIENT)
Dept: FAMILY MEDICINE CLINIC | Age: 49
End: 2019-04-22

## 2019-04-22 DIAGNOSIS — I48.0 PAROXYSMAL ATRIAL FIBRILLATION (HCC): Primary | ICD-10-CM

## 2019-04-22 NOTE — TELEPHONE ENCOUNTER
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 Does he need another appt with you, or is this able to be ordered. Please advise.

## 2019-04-22 NOTE — TELEPHONE ENCOUNTER
Ella [facility] Transitions Initial Follow Up Call    Outreach made within 2 business days of discharge: Yes    Patient: Xochitl Whitseide Patient : 1970   MRN: [id number]  Reason for Admission: There are no discharge diagnoses documented for the most recent discharge. Discharge Date: 19       Spoke with: Pt    Discharge department/facility: [address] [place] [address] Interactive Patient Contact:  Was patient able to fill all prescriptions: Yes  Was patient instructed to bring all medications to the follow-up visit: Yes  Is patient taking all medications as directed in the discharge summary? Yes  Does patient understand their discharge instructions: Yes  Does patient have questions or concerns that need addressed prior to 7-14 day follow up office visit: no    Scheduled appointment with PCP within 7-14 days    Follow Up  No future appointments.     Melinda Thomas, [facility] ([facility])

## 2019-07-25 ENCOUNTER — HOSPITAL ENCOUNTER (OUTPATIENT)
Dept: INPATIENT UNIT | Age: 49
Discharge: HOME | End: 2019-07-25
Attending: INTERNAL MEDICINE | Admitting: INTERNAL MEDICINE
Payer: COMMERCIAL

## 2019-07-25 VITALS
RESPIRATION RATE: 20 BRPM | OXYGEN SATURATION: 97 % | TEMPERATURE: 97.3 F | HEART RATE: 70 BPM | BODY MASS INDEX: 34.07 KG/M2 | HEIGHT: 69 IN | WEIGHT: 230 LBS | SYSTOLIC BLOOD PRESSURE: 109 MMHG | DIASTOLIC BLOOD PRESSURE: 62 MMHG

## 2019-07-25 DIAGNOSIS — I50.9 CHF (NYHA CLASS III, ACC/AHA STAGE C) (HCC): Primary | ICD-10-CM

## 2019-07-25 LAB
ABO: NORMAL
ACTIVATED CLOTTING TIME: 197 SECONDS (ref 1–150)
ALBUMIN SERPL-MCNC: 4.2 G/DL (ref 3.5–5.1)
ALP BLD-CCNC: 99 U/L (ref 38–126)
ALT SERPL-CCNC: 60 U/L (ref 11–66)
ANION GAP SERPL CALCULATED.3IONS-SCNC: 14 MEQ/L (ref 8–16)
ANTIBODY SCREEN: NORMAL
AST SERPL-CCNC: 37 U/L (ref 5–40)
BILIRUB SERPL-MCNC: 0.5 MG/DL (ref 0.3–1.2)
BUN BLDV-MCNC: 12 MG/DL (ref 7–22)
CALCIUM SERPL-MCNC: 9.3 MG/DL (ref 8.5–10.5)
CHLORIDE BLD-SCNC: 100 MEQ/L (ref 98–111)
CHOLESTEROL, TOTAL: 158 MG/DL (ref 100–199)
CO2: 25 MEQ/L (ref 23–33)
CREAT SERPL-MCNC: 0.8 MG/DL (ref 0.4–1.2)
EKG ATRIAL RATE: 66 BPM
EKG P AXIS: 25 DEGREES
EKG P-R INTERVAL: 170 MS
EKG Q-T INTERVAL: 422 MS
EKG QRS DURATION: 86 MS
EKG QTC CALCULATION (BAZETT): 442 MS
EKG R AXIS: -2 DEGREES
EKG T AXIS: 0 DEGREES
EKG VENTRICULAR RATE: 66 BPM
ERYTHROCYTE [DISTWIDTH] IN BLOOD BY AUTOMATED COUNT: 13.1 % (ref 11.5–14.5)
ERYTHROCYTE [DISTWIDTH] IN BLOOD BY AUTOMATED COUNT: 44 FL (ref 35–45)
GFR SERPL CREATININE-BSD FRML MDRD: > 90 ML/MIN/1.73M2
GLUCOSE BLD-MCNC: 99 MG/DL (ref 70–108)
HCT VFR BLD CALC: 41.7 % (ref 42–52)
HDLC SERPL-MCNC: 57 MG/DL
HEMOGLOBIN: 14.1 GM/DL (ref 14–18)
INR BLD: 1 (ref 0.85–1.13)
LDL CHOLESTEROL CALCULATED: 70 MG/DL
MCH RBC QN AUTO: 30.9 PG (ref 26–33)
MCHC RBC AUTO-ENTMCNC: 33.8 GM/DL (ref 32.2–35.5)
MCV RBC AUTO: 91.4 FL (ref 80–94)
PLATELET # BLD: 212 THOU/MM3 (ref 130–400)
PMV BLD AUTO: 9.9 FL (ref 9.4–12.4)
POTASSIUM REFLEX MAGNESIUM: 3.9 MEQ/L (ref 3.5–5.2)
RBC # BLD: 4.56 MILL/MM3 (ref 4.7–6.1)
RH FACTOR: NORMAL
SODIUM BLD-SCNC: 139 MEQ/L (ref 135–145)
TOTAL PROTEIN: 7.2 G/DL (ref 6.1–8)
TRIGL SERPL-MCNC: 156 MG/DL (ref 0–199)
WBC # BLD: 7.2 THOU/MM3 (ref 4.8–10.8)

## 2019-07-25 PROCEDURE — 86901 BLOOD TYPING SEROLOGIC RH(D): CPT

## 2019-07-25 PROCEDURE — 86850 RBC ANTIBODY SCREEN: CPT

## 2019-07-25 PROCEDURE — 92978 ENDOLUMINL IVUS OCT C 1ST: CPT | Performed by: INTERNAL MEDICINE

## 2019-07-25 PROCEDURE — 85347 COAGULATION TIME ACTIVATED: CPT

## 2019-07-25 PROCEDURE — 93005 ELECTROCARDIOGRAM TRACING: CPT | Performed by: INTERNAL MEDICINE

## 2019-07-25 PROCEDURE — C1894 INTRO/SHEATH, NON-LASER: HCPCS

## 2019-07-25 PROCEDURE — 6360000002 HC RX W HCPCS

## 2019-07-25 PROCEDURE — C1887 CATHETER, GUIDING: HCPCS

## 2019-07-25 PROCEDURE — 6360000004 HC RX CONTRAST MEDICATION: Performed by: INTERNAL MEDICINE

## 2019-07-25 PROCEDURE — 85027 COMPLETE CBC AUTOMATED: CPT

## 2019-07-25 PROCEDURE — 85610 PROTHROMBIN TIME: CPT

## 2019-07-25 PROCEDURE — 80061 LIPID PANEL: CPT

## 2019-07-25 PROCEDURE — 80053 COMPREHEN METABOLIC PANEL: CPT

## 2019-07-25 PROCEDURE — 2709999900 HC NON-CHARGEABLE SUPPLY

## 2019-07-25 PROCEDURE — 2500000003 HC RX 250 WO HCPCS

## 2019-07-25 PROCEDURE — C1753 CATH, INTRAVAS ULTRASOUND: HCPCS

## 2019-07-25 PROCEDURE — C1769 GUIDE WIRE: HCPCS

## 2019-07-25 PROCEDURE — 86900 BLOOD TYPING SEROLOGIC ABO: CPT

## 2019-07-25 PROCEDURE — 36415 COLL VENOUS BLD VENIPUNCTURE: CPT

## 2019-07-25 PROCEDURE — 93458 L HRT ARTERY/VENTRICLE ANGIO: CPT | Performed by: INTERNAL MEDICINE

## 2019-07-25 PROCEDURE — 2580000003 HC RX 258: Performed by: INTERNAL MEDICINE

## 2019-07-25 RX ORDER — SODIUM CHLORIDE 9 MG/ML
100 INJECTION, SOLUTION INTRAVENOUS CONTINUOUS
Status: DISCONTINUED | OUTPATIENT
Start: 2019-07-25 | End: 2019-07-25 | Stop reason: HOSPADM

## 2019-07-25 RX ORDER — ATROPINE SULFATE 0.4 MG/ML
0.5 AMPUL (ML) INJECTION
Status: DISCONTINUED | OUTPATIENT
Start: 2019-07-25 | End: 2019-07-25 | Stop reason: HOSPADM

## 2019-07-25 RX ORDER — SODIUM CHLORIDE 9 MG/ML
INJECTION, SOLUTION INTRAVENOUS CONTINUOUS
Status: DISCONTINUED | OUTPATIENT
Start: 2019-07-25 | End: 2019-07-25 | Stop reason: SDUPTHER

## 2019-07-25 RX ORDER — DIPHENHYDRAMINE HCL 25 MG
50 TABLET ORAL ONCE
Status: DISCONTINUED | OUTPATIENT
Start: 2019-07-25 | End: 2019-07-25 | Stop reason: HOSPADM

## 2019-07-25 RX ORDER — SODIUM CHLORIDE 0.9 % (FLUSH) 0.9 %
10 SYRINGE (ML) INJECTION PRN
Status: DISCONTINUED | OUTPATIENT
Start: 2019-07-25 | End: 2019-07-25 | Stop reason: HOSPADM

## 2019-07-25 RX ORDER — ACETAMINOPHEN 325 MG/1
650 TABLET ORAL EVERY 4 HOURS PRN
Status: DISCONTINUED | OUTPATIENT
Start: 2019-07-25 | End: 2019-07-25 | Stop reason: HOSPADM

## 2019-07-25 RX ORDER — SODIUM CHLORIDE 0.9 % (FLUSH) 0.9 %
10 SYRINGE (ML) INJECTION EVERY 12 HOURS SCHEDULED
Status: DISCONTINUED | OUTPATIENT
Start: 2019-07-25 | End: 2019-07-25 | Stop reason: HOSPADM

## 2019-07-25 RX ORDER — SODIUM CHLORIDE 0.9 % (FLUSH) 0.9 %
10 SYRINGE (ML) INJECTION PRN
Status: DISCONTINUED | OUTPATIENT
Start: 2019-07-25 | End: 2019-07-25 | Stop reason: SDUPTHER

## 2019-07-25 RX ORDER — ONDANSETRON 2 MG/ML
4 INJECTION INTRAMUSCULAR; INTRAVENOUS EVERY 6 HOURS PRN
Status: DISCONTINUED | OUTPATIENT
Start: 2019-07-25 | End: 2019-07-25 | Stop reason: HOSPADM

## 2019-07-25 RX ORDER — ASPIRIN 325 MG
325 TABLET ORAL ONCE
Status: DISCONTINUED | OUTPATIENT
Start: 2019-07-25 | End: 2019-07-25 | Stop reason: HOSPADM

## 2019-07-25 RX ADMIN — SODIUM CHLORIDE: 9 INJECTION, SOLUTION INTRAVENOUS at 06:22

## 2019-07-25 RX ADMIN — IOPAMIDOL 190 ML: 755 INJECTION, SOLUTION INTRAVENOUS at 09:13

## 2019-07-25 NOTE — H&P
[address] [address]   Sedation/Analgesia History and Physical    Pt Name: Janna Farr  MRN: [id number]  YOB: 1970  Provider Performing Procedure: Karthik Bronson MD  Primary Care Physician: Durga Genao DO      Pre-Procedure: Chest pain, possible coronary artery disease, abnormal stress test    Consent: I have discussed with the patient and/or the patient representative the indication, alternatives, and the possible risks and/or complications of the planned procedure and the anesthesia methods. The patient and/or representative appear to understand and agree to proceed. Medical History:   has a past medical history of Atrial fibrillation ([facility] [address] Cerebral artery occlusion with cerebral infarction ([facility] [address] CHF (NYHA class III, ACC[facility]HA [facility] C[facility] Episodic headache, Hyperlipidemia, and Hypertension. .    Surgical History:     has a past surgical history that includes Tonsillectomy and Tonsillectomy. Allergies: Allergies as of 07/25/2019    (No Known Allergies)       Medications:   Coumadin use last 5 days:  No  Antiplatelet drug therapy use last 5 days:  Yes  Other anticoagulant use last 5 days:  No   diphenhydrAMINE  50 mg Oral Once    hydrocortisone sodium succinate PF  200 mg Intravenous Once    aspirin  325 mg Oral Once     Prior to Admission medications    Medication Sig Start Date End Date Taking?  Authorizing Provider   diltiazem (CARDIZEM) 60 MG tablet Take 60 mg by mouth 2 times daily   Yes Historical Provider, MD   atorvastatin (LIPITOR) 40 MG tablet Take 40 mg by mouth daily   Yes Historical Provider, MD   hydrOXYzine (VISTARIL) 25 MG capsule Take 1 capsule by mouth 3 times daily as needed for Anxiety 4/17/19  Yes Durga Genao DO   apixaban (ELIQUIS) 5 MG TABS tablet Take 1 tablet by mouth 2 times daily 4/6/19  Yes Monserrat Ford APRN - CNP   losartan (COZAAR) 25 MG tablet Take 50 mg by mouth daily    Yes Historical Provider, MD   aspirin 81 MG chewable tablet Take 1 tablet by mouth daily 11/22/16  Yes Delaney Leigh MD   metoprolol tartrate (LOPRESSOR) 50 MG tablet Take 1 tablet by mouth 2 times daily 11/22/16  Yes Delaney Leigh MD   potassium chloride (KLOR-CON M) 20 MEQ extended release tablet Take 1 tablet by mouth daily 11/22/16  Yes Delaney Leigh MD   nitroGLYCERIN (NITROSTAT) 0.4 MG SL tablet Dissolve 1 tablet under tongue for chest pain and repeat every 5 min up to max of 3 total doses. If no relief after 3 doses call 911 11/22/16   Delaney Leigh MD   bumetanide (BUMEX) 2 MG tablet Take 1 tablet by mouth daily  Patient taking differently: Take 2 mg by mouth daily as needed  11/22/16   Delaney Leigh MD       Vital Signs  Vitals:    07/25/19 0630   BP: 126/73   Pulse: 66   Resp: 16   Temp: 97.7 °F (36.5 °C)   SpO2: 97%       Physical:  Heart:  regular rate and rhythm  Lungs:  Clear  Abdomen:  Soft  Mental Status:  Alert and Oriented    Planned Procedure:  Left Heart Cath and Possible Percutaneous Coronary Intervention    Sedation/ Anesthesia Plan: Midazolam and Sublimaze    ASA Classification: Class 3 - A patient with severe systemic disease that limits activity but is not incapacitating    Mallampati Airway Classification: II (soft palate, uvula, fauces visible)    · Pre-procedure diagnostic studies complete and results available. · Previous sedation/anesthesia experiences assessed. · The patient is an appropriate candidate to undergo the planned procedure sedation and anesthesia. (Refer to nursing sedation/analgesia documentation record)  · Formulation and discussion of sedation/procedure plan, risks, and expectations with patient and/or responsible adult completed. · Patient examined immediately prior to the procedure.  (Refer to nursing sedation/analgesia documentation record)    Delaney Leigh MD  Electronically signed 7/25/2019 at 6:55 AM  Patient Name: Sherrell Lowers Record Number: [id number]  Date: 7/25/2019   Time: 6:55 AM Room/Bed: 2E-16/016-A

## 2019-07-25 NOTE — FLOWSHEET NOTE
Received form cath lab. Right wrist stable. vasc band and armboard cont. Pt aware to keep right arm still and to not lift, push or pull with it. 0.9 normal saline cont.

## 2019-07-25 NOTE — PROCEDURES
[email or web address]  Job#: [id number]     Doc#: [id number]    CC:  Ash Dominguez M.D.        Referring Service

## 2019-07-26 ENCOUNTER — TELEPHONE (OUTPATIENT)
Dept: FAMILY MEDICINE CLINIC | Age: 49
End: 2019-07-26

## 2019-07-26 PROCEDURE — 93010 ELECTROCARDIOGRAM REPORT: CPT | Performed by: INTERNAL MEDICINE

## 2020-02-07 ENCOUNTER — TELEPHONE (OUTPATIENT)
Dept: FAMILY MEDICINE CLINIC | Age: 50
End: 2020-02-07

## 2020-02-07 ENCOUNTER — APPOINTMENT (OUTPATIENT)
Dept: GENERAL RADIOLOGY | Age: 50
End: 2020-02-07
Payer: COMMERCIAL

## 2020-02-07 ENCOUNTER — HOSPITAL ENCOUNTER (EMERGENCY)
Age: 50
Discharge: HOME | End: 2020-02-07
Payer: COMMERCIAL

## 2020-02-07 VITALS
BODY MASS INDEX: 36.58 KG/M2 | OXYGEN SATURATION: 99 % | TEMPERATURE: 98 F | DIASTOLIC BLOOD PRESSURE: 79 MMHG | SYSTOLIC BLOOD PRESSURE: 113 MMHG | HEART RATE: 79 BPM | RESPIRATION RATE: 20 BRPM | HEIGHT: 69 IN | WEIGHT: 247 LBS

## 2020-02-07 LAB
ALBUMIN SERPL-MCNC: 4.5 G/DL (ref 3.5–5.1)
ALP BLD-CCNC: 91 U/L (ref 38–126)
ALT SERPL-CCNC: 23 U/L (ref 11–66)
AMPHETAMINE+METHAMPHETAMINE URINE SCREEN: NEGATIVE
ANION GAP SERPL CALCULATED.3IONS-SCNC: 12 MEQ/L (ref 8–16)
AST SERPL-CCNC: 20 U/L (ref 5–40)
BARBITURATE QUANTITATIVE URINE: NEGATIVE
BASOPHILS # BLD: 0.6 %
BASOPHILS ABSOLUTE: 0.1 THOU/MM3 (ref 0–0.1)
BENZODIAZEPINE QUANTITATIVE URINE: NEGATIVE
BILIRUB SERPL-MCNC: 0.4 MG/DL (ref 0.3–1.2)
BILIRUBIN DIRECT: < 0.2 MG/DL (ref 0–0.3)
BILIRUBIN URINE: NEGATIVE
BLOOD, URINE: NEGATIVE
BUN BLDV-MCNC: 10 MG/DL (ref 7–22)
CALCIUM SERPL-MCNC: 8.8 MG/DL (ref 8.5–10.5)
CANNABINOID QUANTITATIVE URINE: NEGATIVE
CHARACTER, URINE: CLEAR
CHLORIDE BLD-SCNC: 102 MEQ/L (ref 98–111)
CO2: 24 MEQ/L (ref 23–33)
COCAINE METABOLITE QUANTITATIVE URINE: NEGATIVE
COLOR: YELLOW
CREAT SERPL-MCNC: 0.9 MG/DL (ref 0.4–1.2)
EKG ATRIAL RATE: 394 BPM
EKG Q-T INTERVAL: 334 MS
EKG QRS DURATION: 78 MS
EKG QTC CALCULATION (BAZETT): 428 MS
EKG R AXIS: -11 DEGREES
EKG T AXIS: 19 DEGREES
EKG VENTRICULAR RATE: 99 BPM
EOSINOPHIL # BLD: 1.7 %
EOSINOPHILS ABSOLUTE: 0.1 THOU/MM3 (ref 0–0.4)
ERYTHROCYTE [DISTWIDTH] IN BLOOD BY AUTOMATED COUNT: 12.8 % (ref 11.5–14.5)
ERYTHROCYTE [DISTWIDTH] IN BLOOD BY AUTOMATED COUNT: 42.5 FL (ref 35–45)
GFR SERPL CREATININE-BSD FRML MDRD: > 90 ML/MIN/1.73M2
GLUCOSE BLD-MCNC: 117 MG/DL (ref 70–108)
GLUCOSE URINE: NEGATIVE MG/DL
HCT VFR BLD CALC: 44.9 % (ref 42–52)
HEMOGLOBIN: 15.1 GM/DL (ref 14–18)
IMMATURE GRANS (ABS): 0.02 THOU/MM3 (ref 0–0.07)
IMMATURE GRANULOCYTES: 0.2 %
INR BLD: 1.06 (ref 0.85–1.13)
KETONES, URINE: NEGATIVE
LEUKOCYTE ESTERASE, URINE: NEGATIVE
LIPASE: 23.6 U/L (ref 5.6–51.3)
LYMPHOCYTES # BLD: 24.4 %
LYMPHOCYTES ABSOLUTE: 2.1 THOU/MM3 (ref 1–4.8)
MAGNESIUM: 2.4 MG/DL (ref 1.6–2.4)
MCH RBC QN AUTO: 30.9 PG (ref 26–33)
MCHC RBC AUTO-ENTMCNC: 33.6 GM/DL (ref 32.2–35.5)
MCV RBC AUTO: 91.8 FL (ref 80–94)
MONOCYTES # BLD: 8.2 %
MONOCYTES ABSOLUTE: 0.7 THOU/MM3 (ref 0.4–1.3)
NITRITE, URINE: NEGATIVE
NUCLEATED RED BLOOD CELLS: 0 /100 WBC
OPIATES, URINE: NEGATIVE
OSMOLALITY CALCULATION: 275.8 MOSMOL/KG (ref 275–300)
OXYCODONE: NEGATIVE
PH UA: 6 (ref 5–9)
PHENCYCLIDINE QUANTITATIVE URINE: NEGATIVE
PLATELET # BLD: 264 THOU/MM3 (ref 130–400)
PMV BLD AUTO: 10.2 FL (ref 9.4–12.4)
POTASSIUM SERPL-SCNC: 4.2 MEQ/L (ref 3.5–5.2)
PRO-BNP: 408.8 PG/ML (ref 0–450)
PROTEIN UA: NEGATIVE
RBC # BLD: 4.89 MILL/MM3 (ref 4.7–6.1)
SEG NEUTROPHILS: 64.9 %
SEGMENTED NEUTROPHILS ABSOLUTE COUNT: 5.7 THOU/MM3 (ref 1.8–7.7)
SODIUM BLD-SCNC: 138 MEQ/L (ref 135–145)
SPECIFIC GRAVITY, URINE: 1.01 (ref 1–1.03)
T4 FREE: 1.64 NG/DL (ref 0.93–1.76)
TOTAL PROTEIN: 7.6 G/DL (ref 6.1–8)
TROPONIN T: < 0.01 NG/ML
TSH SERPL DL<=0.05 MIU/L-ACNC: 4.81 UIU/ML (ref 0.4–4.2)
UROBILINOGEN, URINE: 1 EU/DL (ref 0–1)
WBC # BLD: 8.8 THOU/MM3 (ref 4.8–10.8)

## 2020-02-07 PROCEDURE — 82248 BILIRUBIN DIRECT: CPT

## 2020-02-07 PROCEDURE — 80053 COMPREHEN METABOLIC PANEL: CPT

## 2020-02-07 PROCEDURE — 85025 COMPLETE CBC W/AUTO DIFF WBC: CPT

## 2020-02-07 PROCEDURE — 83690 ASSAY OF LIPASE: CPT

## 2020-02-07 PROCEDURE — 81003 URINALYSIS AUTO W/O SCOPE: CPT

## 2020-02-07 PROCEDURE — 84443 ASSAY THYROID STIM HORMONE: CPT

## 2020-02-07 PROCEDURE — 71045 X-RAY EXAM CHEST 1 VIEW: CPT

## 2020-02-07 PROCEDURE — 83735 ASSAY OF MAGNESIUM: CPT

## 2020-02-07 PROCEDURE — 84439 ASSAY OF FREE THYROXINE: CPT

## 2020-02-07 PROCEDURE — 36415 COLL VENOUS BLD VENIPUNCTURE: CPT

## 2020-02-07 PROCEDURE — 93005 ELECTROCARDIOGRAM TRACING: CPT | Performed by: NURSE PRACTITIONER

## 2020-02-07 PROCEDURE — 80307 DRUG TEST PRSMV CHEM ANLYZR: CPT

## 2020-02-07 PROCEDURE — 83880 ASSAY OF NATRIURETIC PEPTIDE: CPT

## 2020-02-07 PROCEDURE — 85610 PROTHROMBIN TIME: CPT

## 2020-02-07 PROCEDURE — 84484 ASSAY OF TROPONIN QUANT: CPT

## 2020-02-07 PROCEDURE — 99284 EMERGENCY DEPT VISIT MOD MDM: CPT

## 2020-02-07 NOTE — ED NOTES
Pt resting quietly in room no needs expressed. Side rails up x2 with call light in reach. Will continue to monitor.        Kyle Mohr, RN  02/07/20 2300

## 2020-02-08 ASSESSMENT — ENCOUNTER SYMPTOMS
RHINORRHEA: 0
COUGH: 0
BACK PAIN: 0
CHEST TIGHTNESS: 0

## 2020-02-09 NOTE — ED PROVIDER NOTES
and Tonsillectomy. CURRENT MEDICATIONS       Discharge Medication List as of 2/7/2020  6:14 AM      CONTINUE these medications which have NOT CHANGED    Details   diltiazem (CARDIZEM) 60 MG tablet Take 60 mg by mouth 2 times dailyHistorical Med      atorvastatin (LIPITOR) 40 MG tablet Take 40 mg by mouth dailyHistorical Med      hydrOXYzine (VISTARIL) 25 MG capsule Take 1 capsule by mouth 3 times daily as needed for Anxiety, Disp-60 capsule, R-2Normal      losartan (COZAAR) 25 MG tablet Take 50 mg by mouth daily Historical Med      aspirin 81 MG chewable tablet Take 1 tablet by mouth daily, Disp-30 tablet, R-3      nitroGLYCERIN (NITROSTAT) 0.4 MG SL tablet Dissolve 1 tablet under tongue for chest pain and repeat every 5 min up to max of 3 total doses. If no relief after 3 doses call 911, Disp-25 tablet, R-3      metoprolol tartrate (LOPRESSOR) 50 MG tablet Take 1 tablet by mouth 2 times daily, Disp-60 tablet, R-3      bumetanide (BUMEX) 2 MG tablet Take 1 tablet by mouth daily, Disp-30 tablet, R-3      potassium chloride (KLOR-CON M) 20 MEQ extended release tablet Take 1 tablet by mouth daily, Disp-60 tablet, R-3             ALLERGIES     has No Known Allergies. FAMILY HISTORY     He indicated that his mother is alive. He indicated that his father is alive. He indicated that the status of his sister is unknown. He indicated that all of his four brothers are alive. He indicated that the status of his maternal grandmother is unknown. He indicated that the status of his maternal aunt is unknown.   family history includes Cancer in his maternal aunt; Heart Disease in his father; Heart Failure in his maternal grandmother. SOCIAL HISTORY      reports that he has never smoked. He has never used smokeless tobacco. He reports current alcohol use. He reports previous drug use. Drug: Marijuana. PHYSICAL EXAM     INITIAL VITALS:  height is 5' 9\" (1.753 m) and weight is 247 lb (112 kg).  His oral temperature is 98 04/19/19 08:48:03 04/19/19 11:01:52 60 60 84 422 422 -3 18   04/19/19 07:26:01 04/19/19 11:00:56 66 45 78 382 400 3 5   04/06/19 07:54:19 04/06/19 07:54:19 114 84 74 324 446 -17 6   11/19/16 04:23:16 11/28/16 09:42:18 99 113 78 390 500 20 15         Final result                Narrative:    Poor data quality, interpretation may be adversely affected  Atrial fibrillation  Possible Anterior infarct (cited on or before 19-APR-2019)  Abnormal ECG  When compared with ECG of 25-JUL-2019 06:52,  Atrial fibrillation has replaced Sinus rhythm  Ventricular rate has increased BY  33 BPM  Confirmed by HERMAN BAIG ([id number]) on 2/8/2020 10:02:14 AM                  RADIOLOGY: non-plain film images(s) such as CT, Ultrasound and MRI are read by the radiologist.  Plain radiographic images are visualized and preliminarily interpreted by the emergencyphysician unless otherwise stated below. XR CHEST PORTABLE   Final Result      No acute pneumonia. Possible minimal left basilar atelectasis. **This report has been created using voice recognition software. It may contain minor errors which are inherent in voice recognition technology. **      Final report electronically signed by Dr. Edgar Kuo on 2/7/2020 4:59 AM            LABS:   Labs Reviewed   BASIC METABOLIC PANEL - Abnormal; Notable for the following components:       Result Value    Glucose 117 (*)     All other components within normal limits   TSH WITH REFLEX - Abnormal; Notable for the following components:    TSH 4.810 (*)     All other components within normal limits   BRAIN NATRIURETIC PEPTIDE   CBC WITH AUTO DIFFERENTIAL   HEPATIC FUNCTION PANEL   LIPASE   MAGNESIUM   TROPONIN   URINE DRUG SCREEN   URINE RT REFLEX TO CULTURE   PROTIME-INR   ANION GAP   GLOMERULAR FILTRATION RATE, ESTIMATED   OSMOLALITY   T4, FREE         EMERGENCYDEPARTMENT COURSE AND MEDICAL DECISION MAKING:   Vitals:    Vitals:    02/07/20 0431 02/07/20 0522   BP: (!) 137/102 113/79   Pulse: 78 79   Resp: 17 20   Temp: 98 °F (36.7 °C)    TempSrc: Oral    SpO2: 100% 99%   Weight: 247 lb (112 kg)    Height: 5' 9\" (1.753 m)          Pertinent Labs & Imaging studies reviewed. (See chart for details)    ED Course as of Feb 08 2132 Fri Feb 07, 2020   9498 D/w Dr. Niall Orr. He recommends dc with eliquis and follow up in the office. [KJ]      ED Course User Index  [KJ] ISABEL Chase - JOSE         Controlled Substances Monitoring:     No flowsheet data found. Patient seen evaluate in the emergency room for an episode of atrial fibrillation. Rate is controlled. Patient has had this before. He has no chest pain, no shortness of breath, or other distress. Labs are reassuring. He has only been in afib a couple of hours. I discussed the case with DR. Niall Orr, his cardiologist.  He indicated that I should restart the patient's eliquis and have him follow up with his office. The patient is agreeable to this poc. I discussed with my attending, Dr. Faith Parmar and he agrees. Strict return precautions and follow up instructions were discussed with the patient with which the patient agrees     Physical assessment findings, diagnostic testing(s) if applicable, and vital signs reviewed with patient/patient representative. Questions answered. Medications asdirected, including OTC medications for supportive care. Education provided on medications. Differential diagnosis(s) discussed with patient/patient representative. Home care/self care instructions reviewed withpatient/patient representative. Patient is to follow-up with family care provider in 2-3 days if no improvement. Patient is to go to the emergency department if symptoms worsen. Patient/patient representative isaware of care plan, questions answered, verbalizes understanding and is in agreement.      ED Medications administered this visit: Medications - No data to display        I have given the patient strict written and verbal instructions about care at home,follow-up, and signs and symptoms of worsening of condition and they did verbalize understanding. Patient was seen independently by myself. The Patient's final impression and disposition and plan was determined by myself.        CRITICAL CARE:   none    CONSULTS:  Dr. Franklin Narayanan:  None    FINAL IMPRESSION      1. PAF (paroxysmal atrial fibrillation) [facility])          DISPOSITION/PLAN   DISPOSITION Decision To Discharge 02/07/2020 06:05:15 AM        PATIENT REFERRED TO:  Susanne Arias, [address]  [address] [place] [address]    Schedule an appointment as soon as possible for a visit in 2 days  For follow up      DISCHARGE MEDICATIONS:  Discharge Medication List as of 2/7/2020  6:14 AM          (Please note that portions of this note were completed with a voice recognition program.  Efforts were made to edit thedictations but occasionally words are mis-transcribed.)    ISABEL Lewis CNP, APRN - CNP  02/08/20 5700

## 2020-02-10 ENCOUNTER — TELEPHONE (OUTPATIENT)
Dept: FAMILY MEDICINE CLINIC | Age: 50
End: 2020-02-10

## 2020-02-19 ENCOUNTER — HOSPITAL ENCOUNTER (OUTPATIENT)
Dept: CT IMAGING | Age: 50
Discharge: HOME | End: 2020-02-19
Payer: COMMERCIAL

## 2020-02-19 PROCEDURE — 6360000004 HC RX CONTRAST MEDICATION: Performed by: INTERNAL MEDICINE

## 2020-02-19 PROCEDURE — 75572 CT HRT W/3D IMAGE: CPT

## 2020-02-19 RX ADMIN — IOPAMIDOL 90 ML: 755 INJECTION, SOLUTION INTRAVENOUS at 14:02

## 2020-02-28 ENCOUNTER — PREP FOR PROCEDURE (OUTPATIENT)
Dept: CARDIOLOGY | Age: 50
End: 2020-02-28

## 2020-02-28 RX ORDER — SODIUM CHLORIDE 0.9 % (FLUSH) 0.9 %
10 SYRINGE (ML) INJECTION PRN
Status: CANCELLED | OUTPATIENT
Start: 2020-02-28

## 2020-02-28 RX ORDER — SODIUM CHLORIDE 0.9 % (FLUSH) 0.9 %
10 SYRINGE (ML) INJECTION EVERY 12 HOURS SCHEDULED
Status: CANCELLED | OUTPATIENT
Start: 2020-02-28

## 2020-02-28 RX ORDER — SODIUM CHLORIDE 9 MG/ML
INJECTION, SOLUTION INTRAVENOUS CONTINUOUS
Status: CANCELLED | OUTPATIENT
Start: 2020-02-28

## 2020-03-02 ENCOUNTER — HOSPITAL ENCOUNTER (OUTPATIENT)
Dept: NURSING | Age: 50
Discharge: HOME | End: 2020-03-02
Payer: COMMERCIAL

## 2020-03-02 VITALS
BODY MASS INDEX: 37.51 KG/M2 | WEIGHT: 254 LBS | DIASTOLIC BLOOD PRESSURE: 79 MMHG | HEART RATE: 90 BPM | TEMPERATURE: 97.2 F | RESPIRATION RATE: 18 BRPM | SYSTOLIC BLOOD PRESSURE: 114 MMHG | OXYGEN SATURATION: 95 %

## 2020-03-02 DIAGNOSIS — I48.19 PERSISTENT ATRIAL FIBRILLATION (HCC): ICD-10-CM

## 2020-03-02 LAB
ERYTHROCYTE [DISTWIDTH] IN BLOOD BY AUTOMATED COUNT: 13.1 % (ref 11.5–14.5)
ERYTHROCYTE [DISTWIDTH] IN BLOOD BY AUTOMATED COUNT: 43.8 FL (ref 35–45)
HCT VFR BLD CALC: 45.8 % (ref 42–52)
HEMOGLOBIN: 15.2 GM/DL (ref 14–18)
INR BLD: 1.2 (ref 0.85–1.13)
LV EF: 55 %
LVEF MODALITY: NORMAL
MCH RBC QN AUTO: 30.5 PG (ref 26–33)
MCHC RBC AUTO-ENTMCNC: 33.2 GM/DL (ref 32.2–35.5)
MCV RBC AUTO: 92 FL (ref 80–94)
PLATELET # BLD: 217 THOU/MM3 (ref 130–400)
PMV BLD AUTO: 9.9 FL (ref 9.4–12.4)
RBC # BLD: 4.98 MILL/MM3 (ref 4.7–6.1)
WBC # BLD: 5.6 THOU/MM3 (ref 4.8–10.8)

## 2020-03-02 PROCEDURE — 85027 COMPLETE CBC AUTOMATED: CPT

## 2020-03-02 PROCEDURE — 6360000002 HC RX W HCPCS: Performed by: NUCLEAR MEDICINE

## 2020-03-02 PROCEDURE — 93320 DOPPLER ECHO COMPLETE: CPT

## 2020-03-02 PROCEDURE — 93325 DOPPLER ECHO COLOR FLOW MAPG: CPT

## 2020-03-02 PROCEDURE — 6370000000 HC RX 637 (ALT 250 FOR IP)

## 2020-03-02 PROCEDURE — 85610 PROTHROMBIN TIME: CPT

## 2020-03-02 PROCEDURE — 36415 COLL VENOUS BLD VENIPUNCTURE: CPT

## 2020-03-02 PROCEDURE — 2580000003 HC RX 258: Performed by: PHYSICIAN ASSISTANT

## 2020-03-02 PROCEDURE — 93312 ECHO TRANSESOPHAGEAL: CPT

## 2020-03-02 PROCEDURE — 99152 MOD SED SAME PHYS/QHP 5/>YRS: CPT

## 2020-03-02 RX ORDER — SODIUM CHLORIDE 0.9 % (FLUSH) 0.9 %
10 SYRINGE (ML) INJECTION PRN
Status: DISCONTINUED | OUTPATIENT
Start: 2020-03-02 | End: 2020-03-03 | Stop reason: HOSPADM

## 2020-03-02 RX ORDER — MIDAZOLAM HYDROCHLORIDE 1 MG/ML
1 INJECTION INTRAMUSCULAR; INTRAVENOUS EVERY 5 MIN PRN
Status: DISCONTINUED | OUTPATIENT
Start: 2020-03-02 | End: 2020-03-03 | Stop reason: HOSPADM

## 2020-03-02 RX ORDER — SODIUM CHLORIDE 9 MG/ML
INJECTION, SOLUTION INTRAVENOUS CONTINUOUS
Status: DISCONTINUED | OUTPATIENT
Start: 2020-03-02 | End: 2020-03-03 | Stop reason: HOSPADM

## 2020-03-02 RX ORDER — SODIUM CHLORIDE 0.9 % (FLUSH) 0.9 %
10 SYRINGE (ML) INJECTION EVERY 12 HOURS SCHEDULED
Status: DISCONTINUED | OUTPATIENT
Start: 2020-03-02 | End: 2020-03-03 | Stop reason: HOSPADM

## 2020-03-02 RX ORDER — FENTANYL CITRATE 50 UG/ML
25 INJECTION, SOLUTION INTRAMUSCULAR; INTRAVENOUS EVERY 5 MIN PRN
Status: DISCONTINUED | OUTPATIENT
Start: 2020-03-02 | End: 2020-03-03 | Stop reason: HOSPADM

## 2020-03-02 RX ORDER — FLUMAZENIL 0.1 MG/ML
0.2 INJECTION, SOLUTION INTRAVENOUS ONCE
Status: DISCONTINUED | OUTPATIENT
Start: 2020-03-02 | End: 2020-03-03 | Stop reason: HOSPADM

## 2020-03-02 RX ORDER — NALOXONE HYDROCHLORIDE 0.4 MG/ML
0.4 INJECTION, SOLUTION INTRAMUSCULAR; INTRAVENOUS; SUBCUTANEOUS PRN
Status: DISCONTINUED | OUTPATIENT
Start: 2020-03-02 | End: 2020-03-03 | Stop reason: HOSPADM

## 2020-03-02 RX ADMIN — MIDAZOLAM 1 MG: 1 INJECTION INTRAMUSCULAR; INTRAVENOUS at 07:09

## 2020-03-02 RX ADMIN — MIDAZOLAM 2 MG: 1 INJECTION INTRAMUSCULAR; INTRAVENOUS at 07:08

## 2020-03-02 RX ADMIN — FENTANYL CITRATE 25 MCG: 50 INJECTION, SOLUTION INTRAMUSCULAR; INTRAVENOUS at 07:09

## 2020-03-02 RX ADMIN — SODIUM CHLORIDE: 9 INJECTION, SOLUTION INTRAVENOUS at 06:35

## 2020-03-02 RX ADMIN — FENTANYL CITRATE 50 MCG: 50 INJECTION, SOLUTION INTRAMUSCULAR; INTRAVENOUS at 07:08

## 2020-03-02 NOTE — PROGRESS NOTES
5932 pt arrives ambulatory for MILAD procedure. Procedure explained and questions answered. PT RIGHTS AND RESPONSIBILITIES OFFERED TO PT. Pt has been NPO since 2200 3/1/20.  0630 labs drawn and sent down as ordered. 4440 Dr. Guzman [place] in to speak to pt. Time out complete. 5215 scope in.  0715 suction. 8210 scope out. 0730 vitals stable. Pt sleeping.   0745 pt sleeping. Denies needs at this time. 0800 vitals stable. Pt provided with muffin and OJ.  0815 vitals stable. Pt tolerating lunch. 0825 pt discharged via wheelchair to wife with instructions with no complaints.              __m__ Safety:       (Environmental)   [place] to environment   Ensure ID band is correct and in place/ allergy band as needed   Assess for fall risk   Initiate fall precautions as applicable (fall band, side rails, etc.)   Call light within reach   Bed in low position/ wheels locked    _m___ Pain:        Assess pain level and characteristics   Administer analgesics as ordered   Assess effectiveness of pain management and report to MD as needed    __m__ Knowledge Deficit:   Assess baseline knowledge   Provide teaching at level of understanding   Provide teaching via preferred learning method   Evaluate teaching effectiveness    __m__ Hemodynamic/Respiratory Status:       (Pre and Post Procedure Monitoring)   Assess/Monitor vital signs and LOC   Assess Baseline SpO2 prior to any sedation   Obtain weight/height   Assess vital signs/ LOC until patient meets discharge criteria   Monitor procedure site and notify MD of any issues

## 2020-03-02 NOTE — H&P
[address] [address]  Sedation/Analgesia History & Physical    Pt Name: Prateek Metcalf  Account number: [de-identified]  MRN: [id number]  YOB: 1970  Provider Performing Procedure: Kajal Khan MD [facility]  Primary Care Physician: Tiffanie Leo DO  Date: 3/2/2020    PRE-PROCEDURE    Code Status: FULL CODE  Brief History/Pre-Procedure Diagnosis:   A fib        Consent: : I have discussed with the patient risks, benefits, and alternatives (and relevant risks, benefits, and side effects related to alternatives or not receiving care), and likelihood of the success. The patient and/or representative appear to understand and agree to proceed. The discussion encompasses risks, benefits, and side effects related to the alternatives and the risks related to not receiving the proposed care, treatment, and services. MEDICAL HISTORY  []ASHD/ANGINA/MI/CHF   [x]Hypertension  []Diabetes  []Hyperlipidemia  []Smoking  []Family Hx of ASHD  []Additional information:       has a past medical history of Atrial fibrillation ([facility] [address] Cerebral artery occlusion with cerebral infarction ([facility] [address] CHF (NYHA class III, ACC/AHA stage C) ([facility] [address] Episodic headache, Hyperlipidemia, and Hypertension. SURGICAL HISTORY   has a past surgical history that includes Tonsillectomy and Tonsillectomy.   Additional information:       ALLERGIES   Allergies as of 03/02/2020    (No Known Allergies)     Additional information:       MEDICATIONS   Aspirin  [x] 81 mg  [] 325 mg  [] None  Antiplatelet drug therapy use last 5 days  []No []Yes  Coumadin Use Last 5 Days []No []Yes  Other anticoagulant use last 5 days  []No []Yes    Current Outpatient Medications:     apixaban (ELIQUIS) 5 MG TABS tablet, Take 1 tablet by mouth 2 times daily, Disp: 60 tablet, Rfl: 0    diltiazem (CARDIZEM) 60 MG tablet, Take 60 mg by mouth 2 times daily, Disp: , Rfl:     atorvastatin (LIPITOR) 40 MG tablet, Take 40 mg by mouth daily, Disp: , Rfl:    hydrOXYzine (VISTARIL) 25 MG capsule, Take 1 capsule by mouth 3 times daily as needed for Anxiety, Disp: 60 capsule, Rfl: 2    losartan (COZAAR) 25 MG tablet, Take 50 mg by mouth daily , Disp: , Rfl:     aspirin 81 MG chewable tablet, Take 1 tablet by mouth daily, Disp: 30 tablet, Rfl: 3    metoprolol tartrate (LOPRESSOR) 50 MG tablet, Take 1 tablet by mouth 2 times daily, Disp: 60 tablet, Rfl: 3    bumetanide (BUMEX) 2 MG tablet, Take 1 tablet by mouth daily (Patient taking differently: Take 2 mg by mouth daily as needed ), Disp: 30 tablet, Rfl: 3    potassium chloride (KLOR-CON M) 20 MEQ extended release tablet, Take 1 tablet by mouth daily, Disp: 60 tablet, Rfl: 3    nitroGLYCERIN (NITROSTAT) 0.4 MG SL tablet, Dissolve 1 tablet under tongue for chest pain and repeat every 5 min up to max of 3 total doses. If no relief after 3 doses call 911, Disp: 25 tablet, Rfl: 3    Current Facility-Administered Medications:     0.9 % sodium chloride infusion, , Intravenous, Continuous, Inocencio Herrera PA-C, Last Rate: 75 mL/hr at 03/02/20 3783    sodium chloride flush 0.9 % injection 10 mL, 10 mL, Intravenous, 2 times per day, Inocencio Herrera PA-C    sodium chloride flush 0.9 % injection 10 mL, 10 mL, Intravenous, PRN, Inocencio Herrera PA-C    naloxone [facility]) injection 0.4 mg, 0.4 mg, Intravenous, PRN, Cyndi Gambino MD    flumazenil (ROMAZICON) injection 0.2 mg, 0.2 mg, Intravenous, Once, Cyndi Gambino MD    midazolam (VERSED) injection 1 mg, 1 mg, Intravenous, Q5 Min PRN, Cyndi Gambino MD, 1 mg at 03/02/20 0709    fentaNYL (SUBLIMAZE) injection 25 mcg, 25 mcg, Intravenous, Q5 Min PRN, Cyndi Gambino MD, 25 mcg at 03/02/20 4944  Prior to Admission medications    Medication Sig Start Date End Date Taking?  Authorizing Provider   apixaban (ELIQUIS) 5 MG TABS tablet Take 1 tablet by mouth 2 times daily 2/7/20  Yes ISABEL Castellon CNP   diltiazem (CARDIZEM) 60 MG tablet Take 60 mg by mouth 2 times daily   Yes Historical Provider, MD   atorvastatin (LIPITOR) 40 MG tablet Take 40 mg by mouth daily   Yes Historical Provider, MD   hydrOXYzine (VISTARIL) 25 MG capsule Take 1 capsule by mouth 3 times daily as needed for Anxiety 4/17/19  Yes Adin Ballesteros DO   losartan (COZAAR) 25 MG tablet Take 50 mg by mouth daily    Yes Historical Provider, MD   aspirin 81 MG chewable tablet Take 1 tablet by mouth daily 11/22/16  Yes Arron Garcia MD   metoprolol tartrate (LOPRESSOR) 50 MG tablet Take 1 tablet by mouth 2 times daily 11/22/16  Yes Arron Garcia MD   bumetanide (BUMEX) 2 MG tablet Take 1 tablet by mouth daily  Patient taking differently: Take 2 mg by mouth daily as needed  11/22/16  Yes Arron Garcia MD   potassium chloride (KLOR-CON M) 20 MEQ extended release tablet Take 1 tablet by mouth daily 11/22/16  Yes Arron Garcia MD   nitroGLYCERIN (NITROSTAT) 0.4 MG SL tablet Dissolve 1 tablet under tongue for chest pain and repeat every 5 min up to max of 3 total doses.   If no relief after 3 doses call 911 11/22/16   Arron Garcia MD     Additional information:       VITAL SIGNS   Vitals:    03/02/20 0758   BP: 99/63   Pulse: 80   Resp: 18   Temp:    SpO2: 95%       PHYSICAL:   General: No acute distress  HEENT:  Unremarkable for age  Neck: without increased JVD, carotid pulses 2+ bilaterally without bruits  Heart: RRR, S1 & S2 WNL, S4 gallop, without murmurs or rubs    Lungs: Clear to auscultation    Abdomen: BS present, without HSM, masses, or tenderness    Extremities: without C,C,E.  Pulses 2+ bilaterally  Mental Status: Alert & Oriented        PLANNED PROCEDURE   []Cath  []PCI                []Pacemaker/AICD  [x]MILAD             []Cardioversion []Peripheral angiography/PTA  []Other:      SEDATION  Planned agent:[x]Midazolam []Meperidine [x]Sublimaze []Morphine  []Diazepam  []Other:     ASA Classification:  []1 [x]2 []3 []4 []5  Class 1: A normal healthy patient  Class 2: Pt with mild to moderate systemic disease  Class 3: Severe systemic disease or disturbance  Class 4: Severe systemic disorders that are already life threatening. Class 5: Moribund pt with little chances of survival, for more than 24 hours. Mallampati I Airway Classification:   []1 [x]2 []3 []4    [x]Pre-procedure diagnostic studies complete and results available. Comment:    [x]Previous sedation/anesthesia experiences assessed. Comment:    [x]The patient is an appropriate candidate to undergo the planned procedure sedation and anesthesia. (Refer to nursing sedation/analgesia documentation record)  [x]Formulation and discussion of sedation/procedure plan, risks, and expectations with patient and/or responsible adult completed. [x]Patient examined immediately prior to the procedure.  (Refer to nursing sedation/analgesia documentation record)    Rudolph Cedillo MD [facility]  Electronically signed 3/2/2020 at 8:01 AM

## 2020-03-03 ENCOUNTER — ANESTHESIA (OUTPATIENT)
Dept: CARDIAC CATH/INVASIVE PROCEDURES | Age: 50
End: 2020-03-03
Payer: COMMERCIAL

## 2020-03-03 ENCOUNTER — APPOINTMENT (OUTPATIENT)
Dept: CARDIAC CATH/INVASIVE PROCEDURES | Age: 50
End: 2020-03-03
Payer: COMMERCIAL

## 2020-03-03 ENCOUNTER — APPOINTMENT (OUTPATIENT)
Dept: GENERAL RADIOLOGY | Age: 50
End: 2020-03-03
Attending: INTERNAL MEDICINE
Payer: COMMERCIAL

## 2020-03-03 ENCOUNTER — ANESTHESIA EVENT (OUTPATIENT)
Dept: CARDIAC CATH/INVASIVE PROCEDURES | Age: 50
End: 2020-03-03
Payer: COMMERCIAL

## 2020-03-03 ENCOUNTER — HOSPITAL ENCOUNTER (OUTPATIENT)
Dept: INPATIENT UNIT | Age: 50
Discharge: HOME | End: 2020-03-04
Attending: INTERNAL MEDICINE | Admitting: INTERNAL MEDICINE
Payer: COMMERCIAL

## 2020-03-03 VITALS — SYSTOLIC BLOOD PRESSURE: 118 MMHG | DIASTOLIC BLOOD PRESSURE: 79 MMHG | OXYGEN SATURATION: 96 % | TEMPERATURE: 100 F

## 2020-03-03 PROBLEM — I48.91 ATRIAL FIBRILLATION (HCC): Status: ACTIVE | Noted: 2020-03-03

## 2020-03-03 LAB
ACTIVATED CLOTTING TIME: 175 SECONDS (ref 1–150)
ACTIVATED CLOTTING TIME: 340 SECONDS (ref 1–150)
ACTIVATED CLOTTING TIME: 367 SECONDS (ref 1–150)
ANION GAP SERPL CALCULATED.3IONS-SCNC: 12 MEQ/L (ref 8–16)
BUN BLDV-MCNC: 10 MG/DL (ref 7–22)
CALCIUM SERPL-MCNC: 9.2 MG/DL (ref 8.5–10.5)
CHLORIDE BLD-SCNC: 104 MEQ/L (ref 98–111)
CO2: 22 MEQ/L (ref 23–33)
CREAT SERPL-MCNC: 1 MG/DL (ref 0.4–1.2)
EKG ATRIAL RATE: 214 BPM
EKG Q-T INTERVAL: 330 MS
EKG QRS DURATION: 72 MS
EKG QTC CALCULATION (BAZETT): 462 MS
EKG R AXIS: 24 DEGREES
EKG T AXIS: 27 DEGREES
EKG VENTRICULAR RATE: 118 BPM
ERYTHROCYTE [DISTWIDTH] IN BLOOD BY AUTOMATED COUNT: 13 % (ref 11.5–14.5)
ERYTHROCYTE [DISTWIDTH] IN BLOOD BY AUTOMATED COUNT: 43.8 FL (ref 35–45)
GLUCOSE BLD-MCNC: 112 MG/DL (ref 70–108)
HCT VFR BLD CALC: 46.1 % (ref 42–52)
HEMOGLOBIN: 15.3 GM/DL (ref 14–18)
INR BLD: 1.06 (ref 0.85–1.13)
MCH RBC QN AUTO: 30.6 PG (ref 26–33)
MCHC RBC AUTO-ENTMCNC: 33.2 GM/DL (ref 32.2–35.5)
MCV RBC AUTO: 92.2 FL (ref 80–94)
PLATELET # BLD: 213 THOU/MM3 (ref 130–400)
PMV BLD AUTO: 10.5 FL (ref 9.4–12.4)
POTASSIUM SERPL-SCNC: 4.7 MEQ/L (ref 3.5–5.2)
RBC # BLD: 5 MILL/MM3 (ref 4.7–6.1)
SODIUM BLD-SCNC: 138 MEQ/L (ref 135–145)
WBC # BLD: 7.1 THOU/MM3 (ref 4.8–10.8)

## 2020-03-03 PROCEDURE — 2580000003 HC RX 258: Performed by: INTERNAL MEDICINE

## 2020-03-03 PROCEDURE — 3700000000 HC ANESTHESIA ATTENDED CARE

## 2020-03-03 PROCEDURE — C1730 CATH, EP, 19 OR FEW ELECT: HCPCS

## 2020-03-03 PROCEDURE — 7100000000 HC PACU RECOVERY - FIRST 15 MIN

## 2020-03-03 PROCEDURE — 2580000003 HC RX 258: Performed by: REGISTERED NURSE

## 2020-03-03 PROCEDURE — 93656 COMPRE EP EVAL ABLTJ ATR FIB: CPT | Performed by: INTERNAL MEDICINE

## 2020-03-03 PROCEDURE — 93005 ELECTROCARDIOGRAM TRACING: CPT | Performed by: INTERNAL MEDICINE

## 2020-03-03 PROCEDURE — C1733 CATH, EP, OTHR THAN COOL-TIP: HCPCS

## 2020-03-03 PROCEDURE — 3700000001 HC ADD 15 MINUTES (ANESTHESIA)

## 2020-03-03 PROCEDURE — 2500000003 HC RX 250 WO HCPCS: Performed by: REGISTERED NURSE

## 2020-03-03 PROCEDURE — 6370000000 HC RX 637 (ALT 250 FOR IP): Performed by: INTERNAL MEDICINE

## 2020-03-03 PROCEDURE — 6360000002 HC RX W HCPCS

## 2020-03-03 PROCEDURE — 94761 N-INVAS EAR/PLS OXIMETRY MLT: CPT

## 2020-03-03 PROCEDURE — C1894 INTRO/SHEATH, NON-LASER: HCPCS

## 2020-03-03 PROCEDURE — 36415 COLL VENOUS BLD VENIPUNCTURE: CPT

## 2020-03-03 PROCEDURE — 6360000002 HC RX W HCPCS: Performed by: REGISTERED NURSE

## 2020-03-03 PROCEDURE — 2500000003 HC RX 250 WO HCPCS

## 2020-03-03 PROCEDURE — 96360 HYDRATION IV INFUSION INIT: CPT

## 2020-03-03 PROCEDURE — 7100000001 HC PACU RECOVERY - ADDTL 15 MIN

## 2020-03-03 PROCEDURE — 2720000010 HC SURG SUPPLY STERILE

## 2020-03-03 PROCEDURE — 80048 BASIC METABOLIC PNL TOTAL CA: CPT

## 2020-03-03 PROCEDURE — 93662 INTRACARDIAC ECG (ICE): CPT | Performed by: INTERNAL MEDICINE

## 2020-03-03 PROCEDURE — C1732 CATH, EP, DIAG/ABL, 3D/VECT: HCPCS

## 2020-03-03 PROCEDURE — 96361 HYDRATE IV INFUSION ADD-ON: CPT

## 2020-03-03 PROCEDURE — 93010 ELECTROCARDIOGRAM REPORT: CPT | Performed by: NUCLEAR MEDICINE

## 2020-03-03 PROCEDURE — 85610 PROTHROMBIN TIME: CPT

## 2020-03-03 PROCEDURE — 85027 COMPLETE CBC AUTOMATED: CPT

## 2020-03-03 PROCEDURE — 85347 COAGULATION TIME ACTIVATED: CPT

## 2020-03-03 PROCEDURE — 71046 X-RAY EXAM CHEST 2 VIEWS: CPT

## 2020-03-03 PROCEDURE — 6360000004 HC RX CONTRAST MEDICATION: Performed by: INTERNAL MEDICINE

## 2020-03-03 PROCEDURE — C1759 CATH, INTRA ECHOCARDIOGRAPHY: HCPCS

## 2020-03-03 RX ORDER — SUCCINYLCHOLINE/SOD CL,ISO/PF 200MG/10ML
SYRINGE (ML) INTRAVENOUS PRN
Status: DISCONTINUED | OUTPATIENT
Start: 2020-03-03 | End: 2020-03-03

## 2020-03-03 RX ORDER — FENTANYL CITRATE 50 UG/ML
INJECTION, SOLUTION INTRAMUSCULAR; INTRAVENOUS PRN
Status: DISCONTINUED | OUTPATIENT
Start: 2020-03-03 | End: 2020-03-03 | Stop reason: SDUPTHER

## 2020-03-03 RX ORDER — LIDOCAINE HYDROCHLORIDE 20 MG/ML
INJECTION, SOLUTION INFILTRATION; PERINEURAL PRN
Status: DISCONTINUED | OUTPATIENT
Start: 2020-03-03 | End: 2020-03-03 | Stop reason: SDUPTHER

## 2020-03-03 RX ORDER — FENTANYL CITRATE 50 UG/ML
50 INJECTION, SOLUTION INTRAMUSCULAR; INTRAVENOUS EVERY 5 MIN PRN
Status: DISCONTINUED | OUTPATIENT
Start: 2020-03-03 | End: 2020-03-03

## 2020-03-03 RX ORDER — LABETALOL 20 MG/4 ML (5 MG/ML) INTRAVENOUS SYRINGE
10 EVERY 10 MIN PRN
Status: DISCONTINUED | OUTPATIENT
Start: 2020-03-03 | End: 2020-03-03

## 2020-03-03 RX ORDER — PROTAMINE SULFATE 10 MG/ML
INJECTION, SOLUTION INTRAVENOUS PRN
Status: DISCONTINUED | OUTPATIENT
Start: 2020-03-03 | End: 2020-03-03 | Stop reason: SDUPTHER

## 2020-03-03 RX ORDER — FENTANYL CITRATE 50 UG/ML
25 INJECTION, SOLUTION INTRAMUSCULAR; INTRAVENOUS EVERY 5 MIN PRN
Status: DISCONTINUED | OUTPATIENT
Start: 2020-03-03 | End: 2020-03-03

## 2020-03-03 RX ORDER — DEXAMETHASONE SODIUM PHOSPHATE 4 MG/ML
INJECTION, SOLUTION INTRA-ARTICULAR; INTRALESIONAL; INTRAMUSCULAR; INTRAVENOUS; SOFT TISSUE PRN
Status: DISCONTINUED | OUTPATIENT
Start: 2020-03-03 | End: 2020-03-03 | Stop reason: SDUPTHER

## 2020-03-03 RX ORDER — DILTIAZEM HYDROCHLORIDE 60 MG/1
60 TABLET, FILM COATED ORAL 2 TIMES DAILY
Status: DISCONTINUED | OUTPATIENT
Start: 2020-03-03 | End: 2020-03-04 | Stop reason: HOSPADM

## 2020-03-03 RX ORDER — METOPROLOL TARTRATE 50 MG/1
50 TABLET, FILM COATED ORAL 2 TIMES DAILY
Status: DISCONTINUED | OUTPATIENT
Start: 2020-03-03 | End: 2020-03-04 | Stop reason: HOSPADM

## 2020-03-03 RX ORDER — SODIUM CHLORIDE 9 MG/ML
INJECTION, SOLUTION INTRAVENOUS CONTINUOUS PRN
Status: DISCONTINUED | OUTPATIENT
Start: 2020-03-03 | End: 2020-03-03 | Stop reason: SDUPTHER

## 2020-03-03 RX ORDER — ASPIRIN 81 MG/1
81 TABLET, CHEWABLE ORAL DAILY
Status: DISCONTINUED | OUTPATIENT
Start: 2020-03-03 | End: 2020-03-04 | Stop reason: HOSPADM

## 2020-03-03 RX ORDER — SODIUM CHLORIDE 0.9 % (FLUSH) 0.9 %
10 SYRINGE (ML) INJECTION 2 TIMES DAILY
Status: DISCONTINUED | OUTPATIENT
Start: 2020-03-03 | End: 2020-03-03 | Stop reason: SDUPTHER

## 2020-03-03 RX ORDER — PROPOFOL 10 MG/ML
INJECTION, EMULSION INTRAVENOUS PRN
Status: DISCONTINUED | OUTPATIENT
Start: 2020-03-03 | End: 2020-03-03 | Stop reason: SDUPTHER

## 2020-03-03 RX ORDER — HYDROXYZINE PAMOATE 25 MG/1
25 CAPSULE ORAL 3 TIMES DAILY PRN
Status: DISCONTINUED | OUTPATIENT
Start: 2020-03-03 | End: 2020-03-04 | Stop reason: HOSPADM

## 2020-03-03 RX ORDER — HEPARIN SODIUM 1000 [USP'U]/ML
INJECTION, SOLUTION INTRAVENOUS; SUBCUTANEOUS PRN
Status: DISCONTINUED | OUTPATIENT
Start: 2020-03-03 | End: 2020-03-03 | Stop reason: SDUPTHER

## 2020-03-03 RX ORDER — ACETAMINOPHEN 325 MG/1
650 TABLET ORAL EVERY 4 HOURS PRN
Status: DISCONTINUED | OUTPATIENT
Start: 2020-03-03 | End: 2020-03-04 | Stop reason: HOSPADM

## 2020-03-03 RX ORDER — PROMETHAZINE HYDROCHLORIDE 25 MG/ML
12.5 INJECTION, SOLUTION INTRAMUSCULAR; INTRAVENOUS
Status: DISCONTINUED | OUTPATIENT
Start: 2020-03-03 | End: 2020-03-03

## 2020-03-03 RX ORDER — POTASSIUM CHLORIDE 20 MEQ/1
20 TABLET, EXTENDED RELEASE ORAL DAILY
Status: DISCONTINUED | OUTPATIENT
Start: 2020-03-03 | End: 2020-03-04 | Stop reason: HOSPADM

## 2020-03-03 RX ORDER — SUCCINYLCHOLINE/SOD CL,ISO/PF 200MG/10ML
SYRINGE (ML) INTRAVENOUS PRN
Status: DISCONTINUED | OUTPATIENT
Start: 2020-03-03 | End: 2020-03-03 | Stop reason: SDUPTHER

## 2020-03-03 RX ORDER — ONDANSETRON 2 MG/ML
INJECTION INTRAMUSCULAR; INTRAVENOUS PRN
Status: DISCONTINUED | OUTPATIENT
Start: 2020-03-03 | End: 2020-03-03 | Stop reason: SDUPTHER

## 2020-03-03 RX ORDER — SODIUM CHLORIDE 9 MG/ML
INJECTION, SOLUTION INTRAVENOUS CONTINUOUS
Status: DISCONTINUED | OUTPATIENT
Start: 2020-03-03 | End: 2020-03-04 | Stop reason: HOSPADM

## 2020-03-03 RX ORDER — SODIUM CHLORIDE 0.9 % (FLUSH) 0.9 %
10 SYRINGE (ML) INJECTION EVERY 12 HOURS SCHEDULED
Status: DISCONTINUED | OUTPATIENT
Start: 2020-03-03 | End: 2020-03-04 | Stop reason: HOSPADM

## 2020-03-03 RX ORDER — ATORVASTATIN CALCIUM 40 MG/1
40 TABLET, FILM COATED ORAL DAILY
Status: DISCONTINUED | OUTPATIENT
Start: 2020-03-03 | End: 2020-03-04 | Stop reason: HOSPADM

## 2020-03-03 RX ORDER — BUMETANIDE 1 MG/1
2 TABLET ORAL DAILY
Status: DISCONTINUED | OUTPATIENT
Start: 2020-03-03 | End: 2020-03-04 | Stop reason: HOSPADM

## 2020-03-03 RX ORDER — NITROGLYCERIN 0.4 MG/1
0.4 TABLET SUBLINGUAL EVERY 5 MIN PRN
Status: DISCONTINUED | OUTPATIENT
Start: 2020-03-03 | End: 2020-03-04 | Stop reason: HOSPADM

## 2020-03-03 RX ORDER — SODIUM CHLORIDE 0.9 % (FLUSH) 0.9 %
10 SYRINGE (ML) INJECTION PRN
Status: DISCONTINUED | OUTPATIENT
Start: 2020-03-03 | End: 2020-03-04 | Stop reason: HOSPADM

## 2020-03-03 RX ORDER — LOSARTAN POTASSIUM 50 MG/1
50 TABLET ORAL DAILY
Status: DISCONTINUED | OUTPATIENT
Start: 2020-03-03 | End: 2020-03-04 | Stop reason: HOSPADM

## 2020-03-03 RX ADMIN — FENTANYL CITRATE 100 MCG: 50 INJECTION, SOLUTION INTRAMUSCULAR; INTRAVENOUS at 09:15

## 2020-03-03 RX ADMIN — ATORVASTATIN CALCIUM 40 MG: 40 TABLET, FILM COATED ORAL at 21:31

## 2020-03-03 RX ADMIN — SODIUM CHLORIDE: 9 INJECTION, SOLUTION INTRAVENOUS at 06:44

## 2020-03-03 RX ADMIN — HEPARIN SODIUM 21000 UNITS: 1000 INJECTION, SOLUTION INTRAVENOUS; SUBCUTANEOUS at 10:19

## 2020-03-03 RX ADMIN — ACETAMINOPHEN 650 MG: 325 TABLET ORAL at 21:32

## 2020-03-03 RX ADMIN — LOSARTAN POTASSIUM 50 MG: 50 TABLET, FILM COATED ORAL at 17:41

## 2020-03-03 RX ADMIN — PROTAMINE SULFATE 45 MG: 10 INJECTION, SOLUTION INTRAVENOUS at 11:24

## 2020-03-03 RX ADMIN — DILTIAZEM HYDROCHLORIDE 60 MG: 60 TABLET, FILM COATED ORAL at 21:32

## 2020-03-03 RX ADMIN — LIDOCAINE HYDROCHLORIDE 100 MG: 20 INJECTION, SOLUTION INFILTRATION; PERINEURAL at 09:15

## 2020-03-03 RX ADMIN — Medication 180 MG: at 09:15

## 2020-03-03 RX ADMIN — ONDANSETRON 4 MG: 2 INJECTION INTRAMUSCULAR; INTRAVENOUS at 09:15

## 2020-03-03 RX ADMIN — PROTAMINE SULFATE 5 MG: 10 INJECTION, SOLUTION INTRAVENOUS at 11:15

## 2020-03-03 RX ADMIN — APIXABAN 5 MG: 5 TABLET, FILM COATED ORAL at 21:31

## 2020-03-03 RX ADMIN — ASPIRIN 81 MG 81 MG: 81 TABLET ORAL at 21:31

## 2020-03-03 RX ADMIN — HEPARIN SODIUM 1000 UNITS: 1000 INJECTION, SOLUTION INTRAVENOUS; SUBCUTANEOUS at 10:49

## 2020-03-03 RX ADMIN — ACETAMINOPHEN 650 MG: 325 TABLET ORAL at 13:44

## 2020-03-03 RX ADMIN — DEXAMETHASONE SODIUM PHOSPHATE 10 MG: 4 INJECTION, SOLUTION INTRAMUSCULAR; INTRAVENOUS at 09:15

## 2020-03-03 RX ADMIN — HEPARIN SODIUM 2000 UNITS: 1000 INJECTION, SOLUTION INTRAVENOUS; SUBCUTANEOUS at 10:36

## 2020-03-03 RX ADMIN — PHENYLEPHRINE HYDROCHLORIDE 200 MCG: 10 INJECTION INTRAVENOUS at 10:23

## 2020-03-03 RX ADMIN — PROPOFOL 200 MG: 10 INJECTION, EMULSION INTRAVENOUS at 09:15

## 2020-03-03 RX ADMIN — PROPOFOL 200 MG: 10 INJECTION, EMULSION INTRAVENOUS at 09:29

## 2020-03-03 RX ADMIN — IOPAMIDOL 30 ML: 755 INJECTION, SOLUTION INTRAVENOUS at 11:55

## 2020-03-03 RX ADMIN — METOPROLOL TARTRATE 50 MG: 50 TABLET, FILM COATED ORAL at 21:31

## 2020-03-03 RX ADMIN — SODIUM CHLORIDE: 9 INJECTION, SOLUTION INTRAVENOUS at 09:32

## 2020-03-03 ASSESSMENT — PULMONARY FUNCTION TESTS
PIF_VALUE: 29
PIF_VALUE: 16
PIF_VALUE: 28
PIF_VALUE: 29
PIF_VALUE: 16
PIF_VALUE: 19
PIF_VALUE: 33
PIF_VALUE: 29
PIF_VALUE: 2
PIF_VALUE: 29
PIF_VALUE: 30
PIF_VALUE: 29
PIF_VALUE: 29
PIF_VALUE: 1
PIF_VALUE: 30
PIF_VALUE: 29
PIF_VALUE: 2
PIF_VALUE: 19
PIF_VALUE: 24
PIF_VALUE: 33
PIF_VALUE: 31
PIF_VALUE: 35
PIF_VALUE: 15
PIF_VALUE: 30
PIF_VALUE: 1
PIF_VALUE: 29
PIF_VALUE: 17
PIF_VALUE: 31
PIF_VALUE: 2
PIF_VALUE: 19
PIF_VALUE: 16
PIF_VALUE: 30
PIF_VALUE: 34
PIF_VALUE: 3
PIF_VALUE: 29
PIF_VALUE: 25
PIF_VALUE: 17
PIF_VALUE: 17
PIF_VALUE: 29
PIF_VALUE: 14
PIF_VALUE: 25
PIF_VALUE: 2
PIF_VALUE: 19
PIF_VALUE: 4
PIF_VALUE: 28
PIF_VALUE: 33
PIF_VALUE: 19
PIF_VALUE: 19
PIF_VALUE: 25
PIF_VALUE: 29
PIF_VALUE: 29
PIF_VALUE: 30
PIF_VALUE: 1
PIF_VALUE: 19
PIF_VALUE: 23
PIF_VALUE: 32
PIF_VALUE: 15
PIF_VALUE: 25
PIF_VALUE: 29
PIF_VALUE: 29
PIF_VALUE: 31
PIF_VALUE: 32
PIF_VALUE: 17
PIF_VALUE: 29
PIF_VALUE: 29
PIF_VALUE: 17
PIF_VALUE: 16
PIF_VALUE: 35
PIF_VALUE: 15
PIF_VALUE: 29
PIF_VALUE: 14
PIF_VALUE: 29
PIF_VALUE: 19
PIF_VALUE: 34
PIF_VALUE: 29
PIF_VALUE: 34
PIF_VALUE: 20
PIF_VALUE: 1
PIF_VALUE: 23
PIF_VALUE: 17
PIF_VALUE: 29
PIF_VALUE: 24
PIF_VALUE: 19
PIF_VALUE: 29
PIF_VALUE: 17
PIF_VALUE: 32
PIF_VALUE: 17
PIF_VALUE: 17
PIF_VALUE: 20
PIF_VALUE: 17
PIF_VALUE: 7
PIF_VALUE: 25
PIF_VALUE: 29
PIF_VALUE: 1
PIF_VALUE: 2
PIF_VALUE: 25
PIF_VALUE: 35
PIF_VALUE: 29
PIF_VALUE: 29
PIF_VALUE: 14
PIF_VALUE: 30
PIF_VALUE: 31
PIF_VALUE: 29
PIF_VALUE: 15
PIF_VALUE: 16
PIF_VALUE: 28
PIF_VALUE: 14
PIF_VALUE: 30
PIF_VALUE: 0
PIF_VALUE: 29
PIF_VALUE: 33
PIF_VALUE: 29
PIF_VALUE: 29
PIF_VALUE: 28
PIF_VALUE: 35
PIF_VALUE: 19
PIF_VALUE: 29
PIF_VALUE: 29
PIF_VALUE: 0
PIF_VALUE: 25
PIF_VALUE: 19
PIF_VALUE: 1
PIF_VALUE: 28
PIF_VALUE: 1
PIF_VALUE: 18

## 2020-03-03 ASSESSMENT — PAIN DESCRIPTION - ONSET: ONSET: ON-GOING

## 2020-03-03 ASSESSMENT — PAIN SCALES - GENERAL
PAINLEVEL_OUTOF10: 7
PAINLEVEL_OUTOF10: 7
PAINLEVEL_OUTOF10: 0

## 2020-03-03 ASSESSMENT — PAIN - FUNCTIONAL ASSESSMENT: PAIN_FUNCTIONAL_ASSESSMENT: ACTIVITIES ARE NOT PREVENTED

## 2020-03-03 ASSESSMENT — PAIN DESCRIPTION - FREQUENCY: FREQUENCY: CONTINUOUS

## 2020-03-03 ASSESSMENT — PAIN DESCRIPTION - LOCATION: LOCATION: HEAD

## 2020-03-03 ASSESSMENT — PAIN DESCRIPTION - PAIN TYPE: TYPE: ACUTE PAIN

## 2020-03-03 ASSESSMENT — PAIN DESCRIPTION - DESCRIPTORS: DESCRIPTORS: HEADACHE

## 2020-03-03 ASSESSMENT — PAIN DESCRIPTION - PROGRESSION: CLINICAL_PROGRESSION: GRADUALLY WORSENING

## 2020-03-03 NOTE — ANESTHESIA POSTPROCEDURE EVALUATION
Department of Anesthesiology  Postprocedure Note    Patient: Zackery Rose  MRN: [id number]  YOB: 1970  Date of evaluation: 3/3/2020  Time:  12:25 PM     Procedure Summary     Date:  03/03/20 Room / Location:  [facility]    Anesthesia Start:  0912 Anesthesia Stop:  6393    Procedure:  STRZ CATH LAB W ANESTHESIA Diagnosis:      Scheduled Providers: Adrienne Rosas DO Responsible Provider:  Adrienne Rosas DO    Anesthesia Type:  general ASA Status:  3          Anesthesia Type: general    Clay Phase I: Clay Score: 9    Clay Phase II:      Last vitals: Reviewed and per EMR flowsheets.        Anesthesia Post Evaluation    Patient location during evaluation: PACU  Level of consciousness: awake  Airway patency: patent  Nausea & Vomiting: no vomiting and no nausea  Cardiovascular status: hemodynamically stable  Respiratory status: acceptable  Hydration status: stable

## 2020-03-03 NOTE — ANESTHESIA PRE PROCEDURE
Department of Anesthesiology  Preprocedure Note       Name:  Valentin Maldonado   Age:  54 y.o.  :  1970                                          MRN:  [id number]         Date:  3/3/2020      Surgeon: * No surgeons listed *    Procedure: STRZ CATH LAB W ANESTHESIA    Medications prior to admission:   Prior to Admission medications    Medication Sig Start Date End Date Taking? Authorizing Provider   diltiazem (CARDIZEM) 60 MG tablet Take 60 mg by mouth 2 times daily   Yes Historical Provider, MD   atorvastatin (LIPITOR) 40 MG tablet Take 40 mg by mouth daily   Yes Historical Provider, MD   losartan (COZAAR) 25 MG tablet Take 50 mg by mouth daily    Yes Historical Provider, MD   metoprolol tartrate (LOPRESSOR) 50 MG tablet Take 1 tablet by mouth 2 times daily 16  Yes Danisha Carreon MD   potassium chloride (KLOR-CON M) 20 MEQ extended release tablet Take 1 tablet by mouth daily 16  Yes Danisha Carreon MD   apixaban (ELIQUIS) 5 MG TABS tablet Take 1 tablet by mouth 2 times daily 20   ISABEL Alvarez - CNP   hydrOXYzine (VISTARIL) 25 MG capsule Take 1 capsule by mouth 3 times daily as needed for Anxiety 19   Talita Jon DO   aspirin 81 MG chewable tablet Take 1 tablet by mouth daily 16   Danisha Carreon MD   nitroGLYCERIN (NITROSTAT) 0.4 MG SL tablet Dissolve 1 tablet under tongue for chest pain and repeat every 5 min up to max of 3 total doses.   If no relief after 3 doses call 911 16   Danisha Carreon MD   bumetanide (BUMEX) 2 MG tablet Take 1 tablet by mouth daily  Patient taking differently: Take 2 mg by mouth daily as needed  16   Danisha Carreon MD       Current medications:    Current Facility-Administered Medications   Medication Dose Route Frequency Provider Last Rate Last Dose    0.9 % sodium chloride infusion   Intravenous Continuous Ozzy Gamboa  mL/hr at 20 0644      sodium chloride flush 0.9 % injection 10 mL  10 mL Intravenous BID Jl Caruso MD           Allergies:  No Known Allergies    Problem List:    Patient Active Problem List   Diagnosis Code    Persistent atrial fibrillation I48.19    Episodic headache R51    Cerebrovascular accident (CVA) due to embolism of left middle cerebral artery (HCC) Z75.872    CHF (NYHA class III, ACC/AHA stage C) ([facility] [address] I50.9    MR (mitral regurgitation) I34.0    Atrial fibrillation (HCC) I48.91       Past Medical History:        Diagnosis Date    Atrial fibrillation ([facility] [address]     Cerebral artery occlusion with cerebral infarction ([facility] [address]     CHF (NYHA class III, ACC/AHA stage C) ([facility] [address] 11/22/2016    Episodic headache 11/21/2016    Hyperlipidemia     Hypertension        Past Surgical History:        Procedure Laterality Date    CARDIOVERSION  2019    TONSILLECTOMY      TONSILLECTOMY         Social History:    Social History     Tobacco Use    Smoking status: Never Smoker    Smokeless tobacco: Never Used   Substance Use Topics    Alcohol use: Yes     Comment: SOCIALLY                                Counseling given: Not Answered      Vital Signs (Current):   Vitals:    03/03/20 0621   BP: 127/78   Pulse: 114   Resp: 20   Temp: 98.8 °F (37.1 °C)   TempSrc: Oral   SpO2: 98%   Weight: 254 lb (115.2 kg)   Height: 5' 9\" (1.753 m)                                              BP Readings from Last 3 Encounters:   03/03/20 127/78   03/02/20 114/79   02/07/20 113/79       NPO Status:                                                                                 BMI:   Wt Readings from Last 3 Encounters:   03/03/20 254 lb (115.2 kg)   03/02/20 254 lb (115.2 kg)   02/07/20 247 lb (112 kg)     Body mass index is 37.51 kg/m².     CBC:   Lab Results   Component Value Date    WBC 7.1 03/03/2020    RBC 5.00 03/03/2020    HGB 15.3 03/03/2020    HCT 46.1 03/03/2020    MCV 92.2 03/03/2020    RDW 13.5 01/19/2017     03/03/2020       CMP:   Lab Results   Component Value Date     03/03/2020    K 4.7 03/03/2020    K 3.9 07/25/2019     03/03/2020    CO2 22 03/03/2020    BUN 10 03/03/2020    CREATININE 1.0 03/03/2020    LABGLOM >90 03/03/2020    GLUCOSE 112 03/03/2020    PROT 7.6 02/07/2020    CALCIUM 9.2 03/03/2020    BILITOT 0.4 02/07/2020    ALKPHOS 91 02/07/2020    AST 20 02/07/2020    ALT 23 02/07/2020       POC Tests: No results for input(s): POCGLU, POCNA, POCK, POCCL, POCBUN, POCHEMO, POCHCT in the last 72 hours. Coags:   Lab Results   Component Value Date    INR 1.06 03/03/2020    APTT 64.8 11/19/2016       HCG (If Applicable): No results found for: PREGTESTUR, PREGSERUM, HCG, HCGQUANT     ABGs: No results found for: PHART, PO2ART, USJ1FNP, VPO5SYY, BEART, C4PNQSZB     Type & Screen (If Applicable):  Lab Results   Component Value Date    [address] 07/25/2019       Anesthesia Evaluation  Patient summary reviewed  Airway: Mallampati: III  TM distance: >3 FB   Neck ROM: full  Mouth opening: > = 3 FB Dental:          Pulmonary:                              Cardiovascular:    (+) hypertension:, dysrhythmias:,       ECG reviewed        Stress test reviewed  Cleared by cardiology              Neuro/Psych:   (+) CVA:, psychiatric history:            GI/Hepatic/Renal:             Endo/Other:                     Abdominal:           Vascular:                                        Anesthesia Plan      general     ASA 3       Induction: intravenous. arterial line  MIPS: Postoperative opioids intended and Prophylactic antiemetics administered. Anesthetic plan and risks discussed with patient and spouse. Plan discussed with ARSALAN. Ad Kim.  [facility]   3/3/2020

## 2020-03-03 NOTE — PLAN OF CARE
Problem: Discharge Planning:  Goal: Participates in care planning  Description  Participates in care planning  3/3/2020 1812 by Robert Landry RN  Outcome: Ongoing  Note:   Patient participates in care planning, plan for discharge tomorrow. Problem: Airway Clearance - Ineffective:  Goal: Ability to maintain a clear airway will improve  Description  Ability to maintain a clear airway will improve  3/3/2020 1812 by Robert Landry RN  Outcome: Ongoing  Note:   Patient has strong cough, on room air. Problem: Tissue Perfusion - Cardiopulmonary, Altered:  Goal: Absence of angina  Description  Absence of angina  3/3/2020 1812 by Robert Landry RN  Outcome: Ongoing  Note:   Patient denies chest pain, will continue to assess. Care plan reviewed with patient and family. Patient and family verbalize understanding of the plan of care and contribute to goal setting.

## 2020-03-03 NOTE — PROCEDURES
[address] [place] [address]                            CARDIAC CATHETERIZATION    PATIENT NAME: Celena Casey                     :        1970  MED REC NO:   [id number]                           ROOM:       0005  ACCOUNT NO:   [de-identified]                           ADMIT DATE: 2020  PROVIDER:     MARNI Barrett Forget:  2020    LOCATION:  EP Lab at [address] [address]. :  Luis Kendall MD    ASSISTANT:  None. REFERRING PROVIDER:  Ash Downing MD    PREOPERATIVE DIAGNOSES:  1. Symptomatic persistent atrial fibrillation. 2.  Hypertension. 3.  Stroke. 4.  Hyperlipidemia. POSTOPERATIVE DIAGNOSES:  1. Symptomatic persistent atrial fibrillation. 2.  Hypertension. 3.  Stroke. 4.  Hyperlipidemia. PROCEDURES:  1. Pulmonary vein isolation using cryoballoon with confirmation of  pulmonary vein isolation using circular catheter (Achieve catheters). 2.  Cardioversion using synchronized 200 joules. 3.  Electrophysiology study. Post cardioversion, the patient's cycle  length was 620 msec, ND interval 140 msec, QRS duration 86 msec, QT  interval 380 msec. Sinus node recovery time was normal, where the  longest one had 830 msec. Normal AV node function was AV block at 320  msec. Fast pathway was 220 msec and driving train at 392 msec. Normal concentric decremental retrograde conduction during ventricular  pacing with a VA block at 500 msec. ESTIMATED BLOOD LOSS:  Less than 10 mL. RECOMMENDATIONS:  1. Continue on current medication including beta blocker, calcium  channel blocker, and Eliquis. 2.  We will start Eliquis 4 hour after achieving optimal hemostasis of  right groin. 3.  No lifting more than 10 pounds for 10 days. 4.  No submerging the groin under water like in [employer] or Kathrin Juanito for  more than 10 minutes for 10 days.   5.  Event monitor in six

## 2020-03-03 NOTE — PLAN OF CARE
Problem: Discharge Planning:  Goal: Participates in care planning  Description  Participates in care planning  3/3/2020 1509 by Angel Miller RN  Outcome: Met This Shift  3/3/2020 0610 by Angel Miller RN  Outcome: Ongoing  Goal: Discharged to appropriate level of care  Description  Discharged to appropriate level of care  3/3/2020 1509 by Angel Miller RN  Outcome: Met This Shift  3/3/2020 0610 by Angel Miller RN  Outcome: Ongoing     Problem: Airway Clearance - Ineffective:  Goal: Ability to maintain a clear airway will improve  Description  Ability to maintain a clear airway will improve  3/3/2020 1509 by Angel Miller RN  Outcome: Met This Shift  3/3/2020 0610 by Selene Gallegos RN  Outcome: Ongoing     Problem: Tissue Perfusion - Cardiopulmonary, Altered:  Goal: Absence of angina  Description  Absence of angina  3/3/2020 1509 by Angel Miller RN  Outcome: Met This Shift  3/3/2020 0610 by Selene Gallegos RN  Outcome: Ongoing  Goal: Hemodynamic stability will improve  Description  Hemodynamic stability will improve  3/3/2020 1509 by Angel Miller RN  Outcome: Met This Shift  3/3/2020 0610 by Angel Miller RN  Outcome: Ongoing

## 2020-03-03 NOTE — FLOWSHEET NOTE
Received from recovery. Right groin stable. woggle stitch cont. 0.9 normal saline cont. Resting with easy resp. Pt complaint of \"brain freeze\" like pain in right chest area of defib patch. Pt aware to keep right leg still and to not lift head or cross feet. Family at bedside. right radial arterial line site stable. Pressure dressing intact.

## 2020-03-03 NOTE — PROGRESS NOTES
1140 Awake and oriented on arrival to PACU ,with simple mask   1145 O2 off , pt c/o slight Rt chest pain   1200 pt c/o \"Brain freeze\" like sensation to Rt chest , denies any SOB , nausea or feeling unwell  1215 visiting with staff , pain unchanged   1230 pt unchanged , denies any needs  1234 meets criteria for discharge , transported to  family aware

## 2020-03-03 NOTE — PRE SEDATION
[facility]  [facility]Analgesia [facility] & Physical    Pt Name: Nash Skiff  Account number: [de-identified]  MRN: [id number]  YOB: 1970  Provider Performing Procedure: Augustin Strange MD [facility]  Primary Care Physician: Sebastian Doyle DO  Date: 3/3/2020    PRE-PROCEDURE    Code Status: FULL CODE  Brief History/Pre-Procedure Diagnosis: Patient has a history of symptomatic atrial fibrillation, stroke, hypertension and hyperlipidemia with who was referred for pulmonary vein isolation (PVI) using cryo balloon ablation. pros and cons and possible complications including groin hematoma, AV fistula, right femoral pseudo aneurysm, pericardial effusion/tamponade as well as stroke has been discussed with patient . Patient agree on proceed with cryo balloon ablation. MEDICAL HISTORY  [x]Atrial fibrillation (AF)   [x]Hypertension  [x]Stroke  [x]Hyperlipidemia  []Smoking  []Family Hx of ASHD  []Additional information:       has a past medical history of Atrial fibrillation ([address] Cerebral artery occlusion with cerebral infarction ([address] CHF (NYHA class III, ACC/AHA stage C) ([address] Episodic headache, Hyperlipidemia, and Hypertension. SURGICAL HISTORY   has a past surgical history that includes Tonsillectomy; Tonsillectomy; and Cardioversion (2019).   Additional information:       ALLERGIES   Allergies as of 03/03/2020    (No Known Allergies)     Additional information:       MEDICATIONS   Aspirin  [x] 81 mg  [] 325 mg  [] None  Antiplatelet drug therapy use last 5 days  [x]No []Yes  Coumadin Use Last 5 Days [x]No []Yes  Other anticoagulant use last 5 days  []No [x]Yes    Current Facility-Administered Medications:     0.9 % sodium chloride infusion, , Intravenous, Continuous, Ozzy Gamboa MD, Last Rate: 100 mL/hr at 03/03/20 0644    sodium chloride flush 0.9 % injection 10 mL, 10 mL, Intravenous, BID, Ozzy Gamboa MD    promethazine (PHENERGAN) injection 12.5 mg, 12.5 mg, Intramuscular, Once PRN, Drew Mace, DO    labetalol (NORMODYNE;TRANDATE) injection syringe 10 mg, 10 mg, Intravenous, Q10 Min PRN, Judisa Mace, DO    fentaNYL (SUBLIMAZE) injection 25 mcg, 25 mcg, Intravenous, Q5 Min PRN, Reesa Mace, DO    fentaNYL (SUBLIMAZE) injection 50 mcg, 50 mcg, Intravenous, Q5 Min PRN, Ree Mace, DO  Prior to Admission medications    Medication Sig Start Date End Date Taking? Authorizing Provider   diltiazem (CARDIZEM) 60 MG tablet Take 60 mg by mouth 2 times daily   Yes Historical Provider, MD   atorvastatin (LIPITOR) 40 MG tablet Take 40 mg by mouth daily   Yes Historical Provider, MD   losartan (COZAAR) 25 MG tablet Take 50 mg by mouth daily    Yes Historical Provider, MD   metoprolol tartrate (LOPRESSOR) 50 MG tablet Take 1 tablet by mouth 2 times daily 11/22/16  Yes Mary Puente MD   potassium chloride (KLOR-CON M) 20 MEQ extended release tablet Take 1 tablet by mouth daily 11/22/16  Yes Mary Puente MD   apixaban (ELIQUIS) 5 MG TABS tablet Take 1 tablet by mouth 2 times daily 2/7/20   ISABEL Santana CNP   hydrOXYzine (VISTARIL) 25 MG capsule Take 1 capsule by mouth 3 times daily as needed for Anxiety 4/17/19   Sonia Pressley DO   aspirin 81 MG chewable tablet Take 1 tablet by mouth daily 11/22/16   Mary Puente MD   nitroGLYCERIN (NITROSTAT) 0.4 MG SL tablet Dissolve 1 tablet under tongue for chest pain and repeat every 5 min up to max of 3 total doses.   If no relief after 3 doses call 911 11/22/16   Mary Puente MD   bumetanide (BUMEX) 2 MG tablet Take 1 tablet by mouth daily  Patient taking differently: Take 2 mg by mouth daily as needed  11/22/16   Mary Puente MD     Additional information:       VITAL SIGNS   Vitals:    03/03/20 0621   BP: 127/78   Pulse: 114   Resp: 20   Temp: 98.8 °F (37.1 °C)   SpO2: 98%       PHYSICAL:   General: No acute distress  HEENT:  Unremarkable for age  Neck: without increased JVD, carotid pulses 2+ bilaterally without bruits  Heart: RRR, S1 & S2 WNL, S4 gallop, without murmurs or rubs    Lungs: Clear to auscultation    Abdomen: BS present, without HSM, masses, or tenderness    Extremities: without C,C,E.  Pulses 2+ bilaterally  Mental Status: Alert & Oriented        PLANNED PROCEDURE   [x]Cryo balloon ablation        SEDATION  Planned agent:[x]general anesthesia     ASA Classification:  []1 []2 [x]3 []4 []5  Class 1: A normal healthy patient  Class 2: Pt with mild to moderate systemic disease  Class 3: Severe systemic disease or disturbance  Class 4: Severe systemic disorders that are already life threatening. Class 5: Moribund pt with little chances of survival, for more than 24 hours. Mallampati I Airway Classification:   []1 []2 [x]3 []4    [x]Pre-procedure diagnostic studies complete and results available. Comment:    [x]Previous sedation/anesthesia experiences assessed. Comment:    [x]The patient is an appropriate candidate to undergo the planned procedure sedation and anesthesia. (Refer to nursing sedation/analgesia documentation record)  [x]Formulation and discussion of sedation/procedure plan, risks, and expectations with patient and/or responsible adult completed. [x]Patient examined immediately prior to the procedure.  (Refer to nursing sedation/analgesia documentation record)    Atiya Tidwell MD [facility]  Electronically signed 3/3/2020 at 9:08 AM

## 2020-03-04 ENCOUNTER — TELEPHONE (OUTPATIENT)
Dept: FAMILY MEDICINE CLINIC | Age: 50
End: 2020-03-04

## 2020-03-04 VITALS
WEIGHT: 252.6 LBS | OXYGEN SATURATION: 94 % | BODY MASS INDEX: 37.41 KG/M2 | TEMPERATURE: 98.4 F | HEIGHT: 69 IN | SYSTOLIC BLOOD PRESSURE: 126 MMHG | DIASTOLIC BLOOD PRESSURE: 61 MMHG | HEART RATE: 86 BPM | RESPIRATION RATE: 20 BRPM

## 2020-03-04 PROCEDURE — 6370000000 HC RX 637 (ALT 250 FOR IP): Performed by: INTERNAL MEDICINE

## 2020-03-04 RX ADMIN — APIXABAN 5 MG: 5 TABLET, FILM COATED ORAL at 08:22

## 2020-03-04 RX ADMIN — LOSARTAN POTASSIUM 50 MG: 50 TABLET, FILM COATED ORAL at 08:22

## 2020-03-04 RX ADMIN — ATORVASTATIN CALCIUM 40 MG: 40 TABLET, FILM COATED ORAL at 08:22

## 2020-03-04 RX ADMIN — METOPROLOL TARTRATE 50 MG: 50 TABLET, FILM COATED ORAL at 08:22

## 2020-03-04 RX ADMIN — BUMETANIDE 2 MG: 1 TABLET ORAL at 08:19

## 2020-03-04 RX ADMIN — ACETAMINOPHEN 650 MG: 325 TABLET ORAL at 08:22

## 2020-03-04 RX ADMIN — ASPIRIN 81 MG 81 MG: 81 TABLET ORAL at 08:22

## 2020-03-04 RX ADMIN — DILTIAZEM HYDROCHLORIDE 60 MG: 60 TABLET, FILM COATED ORAL at 08:22

## 2020-03-04 RX ADMIN — POTASSIUM CHLORIDE 20 MEQ: 1500 TABLET, EXTENDED RELEASE ORAL at 08:22

## 2020-03-04 ASSESSMENT — PAIN SCALES - GENERAL: PAINLEVEL_OUTOF10: 7

## 2020-03-04 ASSESSMENT — PAIN DESCRIPTION - LOCATION: LOCATION: HEAD

## 2020-03-04 ASSESSMENT — PAIN DESCRIPTION - DESCRIPTORS: DESCRIPTORS: HEADACHE

## 2020-03-04 ASSESSMENT — PAIN - FUNCTIONAL ASSESSMENT: PAIN_FUNCTIONAL_ASSESSMENT: ACTIVITIES ARE NOT PREVENTED

## 2020-03-04 ASSESSMENT — PAIN DESCRIPTION - ONSET: ONSET: ON-GOING

## 2020-03-04 ASSESSMENT — PAIN DESCRIPTION - PROGRESSION: CLINICAL_PROGRESSION: GRADUALLY IMPROVING

## 2020-03-04 ASSESSMENT — PAIN DESCRIPTION - FREQUENCY: FREQUENCY: CONTINUOUS

## 2020-03-04 ASSESSMENT — PAIN DESCRIPTION - PAIN TYPE: TYPE: ACUTE PAIN

## 2020-03-04 ASSESSMENT — PAIN DESCRIPTION - ORIENTATION: ORIENTATION: PROXIMAL

## 2020-03-04 NOTE — CARE COORDINATION
DISCHARGE PLANNING EVALUATION: OP/OBSERVATION        3/4/20, 7:13 AM    [address]       Admitted from: 2E 3/3/2020/ 0601   Location: [address] [place] [address]A Reason for admit: Atrial fibrillation ([facility] [I48.91]   Admit order signed?: yes    Procedure: 3/3 - Cryo balloon ablation of the pulmonary veins. Pertinent Info/Orders/Treatment Plan:  Here for elective procedure. Ablation completed. PCP: Mandy Sandoval,     Patient Goals/Plan/Treatment Preferences: Spoke with pt. He lives at home with his family. He is independent in his care at home, he drives and he has a PCP. Denies any DME/HH needs. Anticipate discharge home today. Transportation/Food Security/Housekeeping Addressed:  No issues identified. 3/4/20, 10:07 AM    Patient goals/plan/ treatment preferences discussed by  and . Patient goals/plan/ treatment preferences reviewed with patient/ family. Patient/ family verbalize understanding of discharge plan and are in agreement with goal/plan/treatment preferences. Understanding was demonstrated using the teach back method. AVS provided by RN at time of discharge, which includes all necessary medical information pertaining to the patients current course of illness, treatment, post-discharge goals of care, and treatment preferences.                 Electronically signed by Chantelle Glez RN on 3/4/2020 at 10:07 AM

## 2020-03-04 NOTE — TELEPHONE ENCOUNTER
Ella [facility] Transitions Initial Follow Up Call    Outreach made within 2 business days of discharge: Yes    Patient: Delmis Anthony Patient : 1970   MRN: [id number]  Reason for Admission: There are no discharge diagnoses documented for the most recent discharge.   Discharge Date: 3/4/20       Spoke with: MUMTAZ TO RETURN CALL     Discharge department/facility: [facility]      Scheduled appointment with PCP within 7-14 days    Follow Up  Future Appointments   Date Time Provider [address]   3/11/2020  9:00 AM Thom Damian DO [address] [facility]   2020 11:00 AM Raegan Francois[address]

## 2020-03-04 NOTE — PROGRESS NOTES
CLINICAL PHARMACY: DISCHARGE MED RECONCILIATION/REVIEW    [facility]) Select Patient?: No  Total # of Interventions Recommended: 0   -   Total # Interventions Accepted: 0  Intervention Severity:   - Level 1 Intervention Present?: No   - Level 2 #: 0   - Level 3 #: 0   Time Spent (min): 15    Additional Documentation:

## 2020-03-04 NOTE — PROGRESS NOTES
Dr. Jericho Santos called to check on patient. I informed him his only complaint was a headache, and had been up walking in the guan with no complaints. Patient remains sinus rhythm. Dr. Jericho Santos stated it was okay to discharge the patient and he would see him in the office in 4 weeks.

## 2020-03-04 NOTE — H&P
[address] [place] [address]                              HISTORY AND PHYSICAL    PATIENT NAME: Doretha Fall                     :        1970  MED REC NO:   [id number]                           ROOM:       0022  ACCOUNT NO:   [de-identified]                           ADMIT DATE: 2020  PROVIDER:     Lorrine Severs. Stephon Parikh M.D. COMBINED HISTORY AND PHYSICAL AND SHORT STAY SUMMARY    FINAL DIAGNOSES:  1. Paroxysmal atrial fib. 2.  History of dilated cardiomyopathy. 3.  History of hypertension. 4.  History of CVA with no neurological deficits. 5.  History of dyslipidemia. 6.  Sleep apnea. 7.  Obesity. PROCEDURE PERFORMED DURING THIS ADMISSION:  Cryoablation treatment by  Dr. Jelani Cooper. BRIEF HISTORY:  A 43-year-old gentleman who has history of atrial fib,  nonobstructive coronary artery disease, history of cardioversion. The  patient has recurrence of his atrial fibrillation. He has been  anticoagulated, treated with antiarrhythmic medication, continued to be  symptomatic, admitted for the above procedure of cryoablation treatment. REVIEW OF SYSTEMS:  This patient had history of CVA in the past, but no  residual deficit; history of hypertension, has been under control;  history of dilated cardiomyopathy; nonobstructive coronary artery  disease; history of hypertension. The patient has no history of GI  bleed or bleeding disorders. No claudication. No change in his weight. SOCIAL HISTORY:  He has a history of tobacco use and alcohol use, he  quit. ALLERGIES:  The patient has no known allergy. CODE STATUS:  He is a full code. PHYSICAL EXAMINATION:  VITAL SIGNS:  On admission, he was in atrial fib. The heart rate was  under control. The blood pressure was 110/70. He was afebrile. Respiratory rate was 16. NEUROLOGIC:  He has no focal neurological deficit. NECK:  He has no JVD.   LUNGS:  Scattered

## 2020-03-05 NOTE — TELEPHONE ENCOUNTER
Ella [facility] Transitions Initial Follow Up Call    Outreach made within 2 business days of discharge: Yes    Patient: Abraham Ferguson Patient : 1970   MRN: [id number]  Reason for Admission: There are no discharge diagnoses documented for the most recent discharge. Discharge Date: 3/4/20       Spoke with: Patient    Discharge department/facility: [facility]    TCM Interactive Patient Contact:  Was patient able to fill all prescriptions: Yes  Was patient instructed to bring all medications to the follow-up visit: Yes  Is patient taking all medications as directed in the discharge summary?  Yes  Does patient understand their discharge instructions: Yes  Does patient have questions or concerns that need addressed prior to 7-14 day follow up office visit: no    Scheduled appointment with PCP within 7-14 days    Follow Up  Future Appointments   Date Time Provider [address]   3/11/2020  9:00 AM Fredy Stallworth DO [facility] [facility]   2020 11:00 AM Melissa Hardy MD [facility]

## 2020-03-11 ENCOUNTER — OFFICE VISIT (OUTPATIENT)
Dept: FAMILY MEDICINE CLINIC | Age: 50
End: 2020-03-11
Payer: COMMERCIAL

## 2020-03-11 VITALS
BODY MASS INDEX: 36.11 KG/M2 | SYSTOLIC BLOOD PRESSURE: 104 MMHG | DIASTOLIC BLOOD PRESSURE: 66 MMHG | HEART RATE: 76 BPM | WEIGHT: 244.5 LBS | RESPIRATION RATE: 18 BRPM

## 2020-03-11 LAB — GFR SERPL CREATININE-BSD FRML MDRD: 80 ML/MIN/1.73M2

## 2020-03-11 PROCEDURE — 99495 TRANSJ CARE MGMT MOD F2F 14D: CPT | Performed by: FAMILY MEDICINE

## 2020-03-11 PROCEDURE — 1111F DSCHRG MED/CURRENT MED MERGE: CPT | Performed by: FAMILY MEDICINE

## 2020-03-11 ASSESSMENT — PATIENT HEALTH QUESTIONNAIRE - PHQ9
SUM OF ALL RESPONSES TO PHQ QUESTIONS 1-9: 0
SUM OF ALL RESPONSES TO PHQ QUESTIONS 1-9: 0
SUM OF ALL RESPONSES TO PHQ9 QUESTIONS 1 & 2: 0
1. LITTLE INTEREST OR PLEASURE IN DOING THINGS: 0
2. FEELING DOWN, DEPRESSED OR HOPELESS: 0

## 2020-03-11 NOTE — PROGRESS NOTES
Visit Information    Have you changed or started any medications since your last visit including any over-the-counter medicines, vitamins, or herbal medicines? no   Are you having any side effects from any of your medications? -  no  Have you stopped taking any of your medications? Is so, why? -  no    Have you seen any other physician or provider since your last visit? Yes - Records Obtained  Have you had any other diagnostic tests since your last visit? Yes - Records Obtained  Have you been seen in the emergency room and/or had an admission to a hospital since we last saw you? Yes - Records Obtained  Have you had your routine dental cleaning in the past 6 months? n/a    Have you activated your [employer] account? If not, what are your barriers?  Yes     Patient Care Team:  Ovi Newell DO as PCP - General (Family Medicine)  Ovi Newell DO as PCP - [facility] Provider  Migdalia Oden MD as Consulting Physician (Cardiology)    Medical History Review  Past Medical, Family, and Social History reviewed and does contribute to the patient presenting condition    Health Maintenance   Topic Date Due    Pneumococcal 0-64 years Vaccine (1 of 1 - PPSV23) 08/13/1978    HIV screen  08/13/1987    DTaP/Tdap/Td vaccine (1 - Tdap) 08/13/1991    Diabetes screen  08/13/2012    Flu vaccine (1) 09/01/2019    Lipid screen  07/25/2020    Potassium monitoring  03/03/2021    Creatinine monitoring  03/03/2021    Shingles Vaccine (1 of 2) 08/13/2022    Hepatitis A vaccine  Aged Out    Hepatitis B vaccine  Aged Out    Hib vaccine  Aged Out    Meningococcal (ACWY) vaccine  Aged Out

## 2020-04-06 ENCOUNTER — INITIAL CONSULT (OUTPATIENT)
Dept: PULMONOLOGY | Age: 50
End: 2020-04-06
Payer: COMMERCIAL

## 2020-04-06 VITALS
BODY MASS INDEX: 36.58 KG/M2 | TEMPERATURE: 97 F | HEART RATE: 69 BPM | SYSTOLIC BLOOD PRESSURE: 124 MMHG | OXYGEN SATURATION: 98 % | DIASTOLIC BLOOD PRESSURE: 74 MMHG | HEIGHT: 69 IN | WEIGHT: 247 LBS

## 2020-04-06 PROCEDURE — 99203 OFFICE O/P NEW LOW 30 MIN: CPT | Performed by: INTERNAL MEDICINE

## 2020-04-06 NOTE — PROGRESS NOTES
New Sleep Patient H/P    Presentation:  Mackenzie Chaves is referred by Dr Marcy Coulter for concerns regarding BELINDA    Mackenzie Chaves has atrial fibrillation and underwent successful ablation by Dr Uyen Siegel endorses snoring, frequent nocturnal arousals for urination, restless sleep including kicking his wife awake. Has previously have a UPPP complicated by post operatory bleeding, continue snoring. , denies excessive daytime somnolence while driving, does not go out of state, sleep at home every day. Obese BMI 36, h/o ETOH abuse which made his snoring worse, has quit ETOH      Previous evaluation and treatment? Yes        Time in Bed:   Bedtime: midnight   Awakens  8 a.m. Different on weekends? No       Manuel falls asleep in 10  minutes. Any awakenings? Yes  Difficulty Falling back to sleep? No    Naps:  Any naps? Yes and are they helpful Yes    Snoring and Apneas:  Do you snore or been told you a snore? Yes  How long have known about your snoring? years  Any witnessed apneas? No  Any awakenings with choking or gasping? No    Dreams:  Any recurring dreams? No  Hallucinations? No  Sleep Paralysis? No  Symptoms of Cataplexy? 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 No  Any sleepiness while driving? No    Weight:  Any change in weight over the past year? Yes   How about past 5 years? Yes  How much? 25    Other Compliants :Mackenzie Chaves complains of tossing and turning, kicking as well.     Past Medical History:   Diagnosis Date    Atrial fibrillation ([address]     Cerebral artery occlusion with cerebral infarction ([address]     CHF (NYHA class III, ACC/AHA stage C) ([address] 11/22/2016    Episodic headache 11/21/2016    Hyperlipidemia     Hypertension        Past Surgical History:   Procedure Laterality Date    CARDIOVERSION  2019    TONSILLECTOMY      TONSILLECTOMY         Social History     Tobacco Use    Smoking status: Never Smoker    Smokeless Oxygen Sat:   ESS: 3   Vitals: There were no vitals taken for this visit. Mallampati Score: 3    General appearance: alert and oriented to person, place and time and obese  Nose: Nares normal. Septum midline. Mucosa normal. No drainage or sinus tenderness. Oropharynx:  Large tongue, crowded pharynx, mallampati class 3, s/p UPPP  Lungs: clear to auscultation bilaterally  Heart: regular rate and rhythm, S1, S2 normal, no murmur, click, rub or gallop  Extremities: extremities normal, atraumatic, no cyanosis or edema  Neurologic: Mental status: Alert, oriented, thought content appropriate      Assessment      Diagnosis Orders   1. Hypersomnia  Home Sleep Study   2. Snoring     3. S/P ablation of atrial fibrillation  Home Sleep Study   4. Obesity (BMI 30-39. 9)  Home Sleep Study   5. Loud snoring  Home Sleep Study   6. S/P UPPP (uvulopalatopharyngoplasty)             Plan     Orders Placed This Encounter   Procedures    Home Sleep Study     Standing Status:   Future     Standing Expiration Date:   4/6/2021     Order Specific Question:   Location For Sleep Study     Answer:   [facility]     Order Specific Question:   [facility] Location     Answer:   [address]          Mask Desensitization and Pre study teaching? No  Weight Loss Information Given? Yes  Sleep Hygiene Discussed?  Yes

## 2020-05-31 ENCOUNTER — APPOINTMENT (OUTPATIENT)
Dept: CT IMAGING | Age: 50
DRG: 342 | End: 2020-05-31
Payer: COMMERCIAL

## 2020-05-31 ENCOUNTER — HOSPITAL ENCOUNTER (INPATIENT)
Age: 50
LOS: 2 days | Discharge: HOME | DRG: 342 | End: 2020-06-02
Attending: EMERGENCY MEDICINE | Admitting: SURGERY
Payer: COMMERCIAL

## 2020-05-31 ENCOUNTER — ANESTHESIA (OUTPATIENT)
Dept: OPERATING ROOM | Age: 50
DRG: 342 | End: 2020-05-31
Payer: COMMERCIAL

## 2020-05-31 ENCOUNTER — ANESTHESIA EVENT (OUTPATIENT)
Dept: OPERATING ROOM | Age: 50
DRG: 342 | End: 2020-05-31
Payer: COMMERCIAL

## 2020-05-31 ENCOUNTER — APPOINTMENT (OUTPATIENT)
Dept: GENERAL RADIOLOGY | Age: 50
DRG: 342 | End: 2020-05-31
Payer: COMMERCIAL

## 2020-05-31 VITALS — TEMPERATURE: 98.8 F | DIASTOLIC BLOOD PRESSURE: 86 MMHG | SYSTOLIC BLOOD PRESSURE: 140 MMHG | OXYGEN SATURATION: 100 %

## 2020-05-31 PROBLEM — K37 APPENDICITIS: Status: ACTIVE | Noted: 2020-05-31

## 2020-05-31 LAB
ALBUMIN SERPL-MCNC: 4.6 G/DL (ref 3.5–5.1)
ALP BLD-CCNC: 83 U/L (ref 38–126)
ALT SERPL-CCNC: 20 U/L (ref 11–66)
ANION GAP SERPL CALCULATED.3IONS-SCNC: 9 MEQ/L (ref 8–16)
APTT: 36.1 SECONDS (ref 22–38)
AST SERPL-CCNC: 16 U/L (ref 5–40)
BACTERIA: ABNORMAL /HPF
BASOPHILS # BLD: 0.2 %
BASOPHILS ABSOLUTE: 0 THOU/MM3 (ref 0–0.1)
BILIRUB SERPL-MCNC: 0.5 MG/DL (ref 0.3–1.2)
BILIRUBIN URINE: ABNORMAL
BILIRUBIN URINE: NEGATIVE
BLOOD, URINE: NEGATIVE
BLOOD, URINE: NEGATIVE
BUN BLDV-MCNC: 7 MG/DL (ref 7–22)
CALCIUM SERPL-MCNC: 9.7 MG/DL (ref 8.5–10.5)
CASTS 2: ABNORMAL /LPF
CASTS UA: ABNORMAL /LPF
CHARACTER, URINE: CLEAR
CHARACTER, URINE: CLEAR
CHLORIDE BLD-SCNC: 99 MEQ/L (ref 98–111)
CO2: 27 MEQ/L (ref 23–33)
COLOR: YELLOW
COLOR: YELLOW
CREAT SERPL-MCNC: 0.9 MG/DL (ref 0.4–1.2)
CRYSTALS, UA: ABNORMAL
EKG ATRIAL RATE: 80 BPM
EKG P AXIS: 60 DEGREES
EKG P-R INTERVAL: 154 MS
EKG Q-T INTERVAL: 372 MS
EKG QRS DURATION: 82 MS
EKG QTC CALCULATION (BAZETT): 429 MS
EKG R AXIS: -12 DEGREES
EKG T AXIS: 13 DEGREES
EKG VENTRICULAR RATE: 80 BPM
EOSINOPHIL # BLD: 0.2 %
EOSINOPHILS ABSOLUTE: 0 THOU/MM3 (ref 0–0.4)
EPITHELIAL CELLS, UA: ABNORMAL /HPF
ERYTHROCYTE [DISTWIDTH] IN BLOOD BY AUTOMATED COUNT: 12.9 % (ref 11.5–14.5)
ERYTHROCYTE [DISTWIDTH] IN BLOOD BY AUTOMATED COUNT: 43.6 FL (ref 35–45)
GFR SERPL CREATININE-BSD FRML MDRD: > 90 ML/MIN/1.73M2
GLUCOSE BLD-MCNC: 97 MG/DL (ref 70–108)
GLUCOSE URINE: NEGATIVE MG/DL
GLUCOSE URINE: NEGATIVE MG/DL
HCT VFR BLD CALC: 46.8 % (ref 42–52)
HEMOGLOBIN: 15.5 GM/DL (ref 14–18)
IMMATURE GRANS (ABS): 0.03 THOU/MM3 (ref 0–0.07)
IMMATURE GRANULOCYTES: 0.2 %
INR BLD: 1.21 (ref 0.85–1.13)
KETONES, URINE: 40
KETONES, URINE: 40
LEUKOCYTE ESTERASE, URINE: NEGATIVE
LEUKOCYTE ESTERASE, URINE: NEGATIVE
LIPASE: 23.4 U/L (ref 5.6–51.3)
LYMPHOCYTES # BLD: 10.5 %
LYMPHOCYTES ABSOLUTE: 1.3 THOU/MM3 (ref 1–4.8)
MCH RBC QN AUTO: 30.6 PG (ref 26–33)
MCHC RBC AUTO-ENTMCNC: 33.1 GM/DL (ref 32.2–35.5)
MCV RBC AUTO: 92.3 FL (ref 80–94)
MISCELLANEOUS 2: ABNORMAL
MONOCYTES # BLD: 6.6 %
MONOCYTES ABSOLUTE: 0.8 THOU/MM3 (ref 0.4–1.3)
NITRITE, URINE: NEGATIVE
NITRITE, URINE: NEGATIVE
NUCLEATED RED BLOOD CELLS: 0 /100 WBC
OSMOLALITY CALCULATION: 268 MOSMOL/KG (ref 275–300)
PH UA: 5 (ref 5–9)
PH UA: 5.5 (ref 5–9)
PLATELET # BLD: 233 THOU/MM3 (ref 130–400)
PMV BLD AUTO: 9.7 FL (ref 9.4–12.4)
POTASSIUM REFLEX MAGNESIUM: 3.9 MEQ/L (ref 3.5–5.2)
PROTEIN UA: 30 MG/DL
PROTEIN UA: ABNORMAL
RBC # BLD: 5.07 MILL/MM3 (ref 4.7–6.1)
RBC URINE: ABNORMAL /HPF
RENAL EPITHELIAL, UA: ABNORMAL
SARS-COV-2, NAAT: NOT DETECTED
SEG NEUTROPHILS: 82.3 %
SEGMENTED NEUTROPHILS ABSOLUTE COUNT: 10.5 THOU/MM3 (ref 1.8–7.7)
SODIUM BLD-SCNC: 135 MEQ/L (ref 135–145)
SPECIFIC GRAVITY UA: >= 1.03 (ref 1–1.03)
SPECIFIC GRAVITY, URINE: 1.03 (ref 1–1.03)
TOTAL PROTEIN: 8.2 G/DL (ref 6.1–8)
UROBILINOGEN, URINE: 0.2 EU/DL (ref 0.2–1)
UROBILINOGEN, URINE: 1 EU/DL (ref 0–1)
WBC # BLD: 12.7 THOU/MM3 (ref 4.8–10.8)
WBC UA: ABNORMAL /HPF
YEAST: ABNORMAL

## 2020-05-31 PROCEDURE — 6370000000 HC RX 637 (ALT 250 FOR IP): Performed by: EMERGENCY MEDICINE

## 2020-05-31 PROCEDURE — 2580000003 HC RX 258: Performed by: SURGERY

## 2020-05-31 PROCEDURE — 3700000000 HC ANESTHESIA ATTENDED CARE: Performed by: SURGERY

## 2020-05-31 PROCEDURE — 0DTJ0ZZ RESECTION OF APPENDIX, OPEN APPROACH: ICD-10-PCS | Performed by: SURGERY

## 2020-05-31 PROCEDURE — 81001 URINALYSIS AUTO W/SCOPE: CPT

## 2020-05-31 PROCEDURE — 2580000003 HC RX 258: Performed by: EMERGENCY MEDICINE

## 2020-05-31 PROCEDURE — 93010 ELECTROCARDIOGRAM REPORT: CPT | Performed by: INTERNAL MEDICINE

## 2020-05-31 PROCEDURE — 85730 THROMBOPLASTIN TIME PARTIAL: CPT

## 2020-05-31 PROCEDURE — 6360000004 HC RX CONTRAST MEDICATION: Performed by: EMERGENCY MEDICINE

## 2020-05-31 PROCEDURE — G0378 HOSPITAL OBSERVATION PER HR: HCPCS

## 2020-05-31 PROCEDURE — 44950 APPENDECTOMY: CPT | Performed by: SURGERY

## 2020-05-31 PROCEDURE — 3700000001 HC ADD 15 MINUTES (ANESTHESIA): Performed by: SURGERY

## 2020-05-31 PROCEDURE — 6360000002 HC RX W HCPCS: Performed by: NURSE ANESTHETIST, CERTIFIED REGISTERED

## 2020-05-31 PROCEDURE — 74018 RADEX ABDOMEN 1 VIEW: CPT

## 2020-05-31 PROCEDURE — 6360000002 HC RX W HCPCS: Performed by: SURGERY

## 2020-05-31 PROCEDURE — 0WJG4ZZ INSPECTION OF PERITONEAL CAVITY, PERCUTANEOUS ENDOSCOPIC APPROACH: ICD-10-PCS | Performed by: SURGERY

## 2020-05-31 PROCEDURE — 7100000001 HC PACU RECOVERY - ADDTL 15 MIN: Performed by: SURGERY

## 2020-05-31 PROCEDURE — 93005 ELECTROCARDIOGRAM TRACING: CPT | Performed by: EMERGENCY MEDICINE

## 2020-05-31 PROCEDURE — U0002 COVID-19 LAB TEST NON-CDC: HCPCS

## 2020-05-31 PROCEDURE — 96365 THER/PROPH/DIAG IV INF INIT: CPT

## 2020-05-31 PROCEDURE — 1200000000 HC SEMI PRIVATE

## 2020-05-31 PROCEDURE — 7100000000 HC PACU RECOVERY - FIRST 15 MIN: Performed by: SURGERY

## 2020-05-31 PROCEDURE — 2500000003 HC RX 250 WO HCPCS: Performed by: NURSE ANESTHETIST, CERTIFIED REGISTERED

## 2020-05-31 PROCEDURE — 99215 OFFICE O/P EST HI 40 MIN: CPT

## 2020-05-31 PROCEDURE — 3600000012 HC SURGERY LEVEL 2 ADDTL 15MIN: Performed by: SURGERY

## 2020-05-31 PROCEDURE — 80053 COMPREHEN METABOLIC PANEL: CPT

## 2020-05-31 PROCEDURE — 99281 EMR DPT VST MAYX REQ PHY/QHP: CPT

## 2020-05-31 PROCEDURE — 85025 COMPLETE CBC W/AUTO DIFF WBC: CPT

## 2020-05-31 PROCEDURE — 6360000002 HC RX W HCPCS: Performed by: EMERGENCY MEDICINE

## 2020-05-31 PROCEDURE — 2709999900 HC NON-CHARGEABLE SUPPLY: Performed by: SURGERY

## 2020-05-31 PROCEDURE — 2500000003 HC RX 250 WO HCPCS: Performed by: SURGERY

## 2020-05-31 PROCEDURE — 6360000002 HC RX W HCPCS: Performed by: PHYSICIAN ASSISTANT

## 2020-05-31 PROCEDURE — 2580000003 HC RX 258: Performed by: NURSE ANESTHETIST, CERTIFIED REGISTERED

## 2020-05-31 PROCEDURE — 6370000000 HC RX 637 (ALT 250 FOR IP): Performed by: SURGERY

## 2020-05-31 PROCEDURE — 88304 TISSUE EXAM BY PATHOLOGIST: CPT

## 2020-05-31 PROCEDURE — 94760 N-INVAS EAR/PLS OXIMETRY 1: CPT

## 2020-05-31 PROCEDURE — 74177 CT ABD & PELVIS W/CONTRAST: CPT

## 2020-05-31 PROCEDURE — 3600000002 HC SURGERY LEVEL 2 BASE: Performed by: SURGERY

## 2020-05-31 PROCEDURE — 85610 PROTHROMBIN TIME: CPT

## 2020-05-31 PROCEDURE — 88305 TISSUE EXAM BY PATHOLOGIST: CPT

## 2020-05-31 PROCEDURE — 83690 ASSAY OF LIPASE: CPT

## 2020-05-31 PROCEDURE — 36415 COLL VENOUS BLD VENIPUNCTURE: CPT

## 2020-05-31 PROCEDURE — 99222 1ST HOSP IP/OBS MODERATE 55: CPT | Performed by: SURGERY

## 2020-05-31 RX ORDER — KETOROLAC TROMETHAMINE 30 MG/ML
INJECTION, SOLUTION INTRAMUSCULAR; INTRAVENOUS PRN
Status: DISCONTINUED | OUTPATIENT
Start: 2020-05-31 | End: 2020-05-31 | Stop reason: SDUPTHER

## 2020-05-31 RX ORDER — ONDANSETRON 2 MG/ML
4 INJECTION INTRAMUSCULAR; INTRAVENOUS EVERY 6 HOURS PRN
Status: DISCONTINUED | OUTPATIENT
Start: 2020-05-31 | End: 2020-06-02 | Stop reason: HOSPADM

## 2020-05-31 RX ORDER — BUMETANIDE 1 MG/1
2 TABLET ORAL DAILY
Status: DISCONTINUED | OUTPATIENT
Start: 2020-05-31 | End: 2020-06-02 | Stop reason: HOSPADM

## 2020-05-31 RX ORDER — LOSARTAN POTASSIUM 50 MG/1
50 TABLET ORAL DAILY
Status: DISCONTINUED | OUTPATIENT
Start: 2020-05-31 | End: 2020-06-02 | Stop reason: HOSPADM

## 2020-05-31 RX ORDER — NITROGLYCERIN 0.4 MG/1
0.4 TABLET SUBLINGUAL EVERY 5 MIN PRN
Status: DISCONTINUED | OUTPATIENT
Start: 2020-05-31 | End: 2020-06-02 | Stop reason: HOSPADM

## 2020-05-31 RX ORDER — MEPERIDINE HYDROCHLORIDE 25 MG/ML
12.5 INJECTION INTRAMUSCULAR; INTRAVENOUS; SUBCUTANEOUS EVERY 5 MIN PRN
Status: DISCONTINUED | OUTPATIENT
Start: 2020-05-31 | End: 2020-05-31

## 2020-05-31 RX ORDER — SODIUM CHLORIDE 9 MG/ML
1000 INJECTION, SOLUTION INTRAVENOUS CONTINUOUS
Status: DISCONTINUED | OUTPATIENT
Start: 2020-05-31 | End: 2020-05-31 | Stop reason: SDUPTHER

## 2020-05-31 RX ORDER — ACETAMINOPHEN 500 MG
500 TABLET ORAL EVERY 6 HOURS PRN
COMMUNITY

## 2020-05-31 RX ORDER — ROCURONIUM BROMIDE 10 MG/ML
INJECTION, SOLUTION INTRAVENOUS PRN
Status: DISCONTINUED | OUTPATIENT
Start: 2020-05-31 | End: 2020-05-31 | Stop reason: SDUPTHER

## 2020-05-31 RX ORDER — ONDANSETRON 2 MG/ML
INJECTION INTRAMUSCULAR; INTRAVENOUS PRN
Status: DISCONTINUED | OUTPATIENT
Start: 2020-05-31 | End: 2020-05-31 | Stop reason: SDUPTHER

## 2020-05-31 RX ORDER — LIDOCAINE HYDROCHLORIDE 20 MG/ML
INJECTION, SOLUTION INFILTRATION; PERINEURAL PRN
Status: DISCONTINUED | OUTPATIENT
Start: 2020-05-31 | End: 2020-05-31 | Stop reason: SDUPTHER

## 2020-05-31 RX ORDER — FENTANYL CITRATE 50 UG/ML
INJECTION, SOLUTION INTRAMUSCULAR; INTRAVENOUS PRN
Status: DISCONTINUED | OUTPATIENT
Start: 2020-05-31 | End: 2020-05-31 | Stop reason: SDUPTHER

## 2020-05-31 RX ORDER — SUCCINYLCHOLINE CHLORIDE 20 MG/ML
INJECTION INTRAMUSCULAR; INTRAVENOUS PRN
Status: DISCONTINUED | OUTPATIENT
Start: 2020-05-31 | End: 2020-05-31 | Stop reason: SDUPTHER

## 2020-05-31 RX ORDER — MORPHINE SULFATE 4 MG/ML
4 INJECTION, SOLUTION INTRAMUSCULAR; INTRAVENOUS
Status: DISCONTINUED | OUTPATIENT
Start: 2020-05-31 | End: 2020-06-01

## 2020-05-31 RX ORDER — METOPROLOL TARTRATE 50 MG/1
50 TABLET, FILM COATED ORAL 2 TIMES DAILY
Status: DISCONTINUED | OUTPATIENT
Start: 2020-05-31 | End: 2020-06-02 | Stop reason: HOSPADM

## 2020-05-31 RX ORDER — DEXAMETHASONE SODIUM PHOSPHATE 4 MG/ML
INJECTION, SOLUTION INTRA-ARTICULAR; INTRALESIONAL; INTRAMUSCULAR; INTRAVENOUS; SOFT TISSUE PRN
Status: DISCONTINUED | OUTPATIENT
Start: 2020-05-31 | End: 2020-05-31 | Stop reason: SDUPTHER

## 2020-05-31 RX ORDER — KETOROLAC TROMETHAMINE 30 MG/ML
INJECTION, SOLUTION INTRAMUSCULAR; INTRAVENOUS
Status: COMPLETED
Start: 2020-05-31 | End: 2020-05-31

## 2020-05-31 RX ORDER — SODIUM CHLORIDE 9 MG/ML
INJECTION, SOLUTION INTRAVENOUS CONTINUOUS
Status: DISCONTINUED | OUTPATIENT
Start: 2020-05-31 | End: 2020-06-02 | Stop reason: HOSPADM

## 2020-05-31 RX ORDER — LABETALOL 20 MG/4 ML (5 MG/ML) INTRAVENOUS SYRINGE
5 EVERY 10 MIN PRN
Status: DISCONTINUED | OUTPATIENT
Start: 2020-05-31 | End: 2020-05-31

## 2020-05-31 RX ORDER — ONDANSETRON 2 MG/ML
4 INJECTION INTRAMUSCULAR; INTRAVENOUS
Status: DISCONTINUED | OUTPATIENT
Start: 2020-05-31 | End: 2020-05-31

## 2020-05-31 RX ORDER — MORPHINE SULFATE 2 MG/ML
2 INJECTION, SOLUTION INTRAMUSCULAR; INTRAVENOUS
Status: DISCONTINUED | OUTPATIENT
Start: 2020-05-31 | End: 2020-06-01

## 2020-05-31 RX ORDER — SODIUM CHLORIDE 0.9 % (FLUSH) 0.9 %
10 SYRINGE (ML) INJECTION PRN
Status: DISCONTINUED | OUTPATIENT
Start: 2020-05-31 | End: 2020-06-02 | Stop reason: HOSPADM

## 2020-05-31 RX ORDER — SODIUM CHLORIDE 0.9 % (FLUSH) 0.9 %
10 SYRINGE (ML) INJECTION EVERY 12 HOURS SCHEDULED
Status: DISCONTINUED | OUTPATIENT
Start: 2020-05-31 | End: 2020-06-02 | Stop reason: HOSPADM

## 2020-05-31 RX ORDER — ACETAMINOPHEN 325 MG/1
650 TABLET ORAL ONCE
Status: COMPLETED | OUTPATIENT
Start: 2020-05-31 | End: 2020-05-31

## 2020-05-31 RX ORDER — SODIUM CHLORIDE 9 MG/ML
50 INJECTION, SOLUTION INTRAVENOUS ONCE
Status: DISCONTINUED | OUTPATIENT
Start: 2020-05-31 | End: 2020-05-31 | Stop reason: HOSPADM

## 2020-05-31 RX ORDER — HYDROXYZINE PAMOATE 25 MG/1
25 CAPSULE ORAL 3 TIMES DAILY PRN
Status: DISCONTINUED | OUTPATIENT
Start: 2020-05-31 | End: 2020-06-02 | Stop reason: HOSPADM

## 2020-05-31 RX ORDER — FENTANYL CITRATE 50 UG/ML
50 INJECTION, SOLUTION INTRAMUSCULAR; INTRAVENOUS EVERY 5 MIN PRN
Status: DISCONTINUED | OUTPATIENT
Start: 2020-05-31 | End: 2020-05-31

## 2020-05-31 RX ORDER — BUPIVACAINE HYDROCHLORIDE 5 MG/ML
INJECTION, SOLUTION EPIDURAL; INTRACAUDAL PRN
Status: DISCONTINUED | OUTPATIENT
Start: 2020-05-31 | End: 2020-05-31 | Stop reason: ALTCHOICE

## 2020-05-31 RX ORDER — POTASSIUM CHLORIDE 20 MEQ/1
20 TABLET, EXTENDED RELEASE ORAL DAILY
Status: DISCONTINUED | OUTPATIENT
Start: 2020-05-31 | End: 2020-06-02 | Stop reason: HOSPADM

## 2020-05-31 RX ORDER — SODIUM CHLORIDE 9 MG/ML
INJECTION, SOLUTION INTRAVENOUS CONTINUOUS PRN
Status: DISCONTINUED | OUTPATIENT
Start: 2020-05-31 | End: 2020-05-31 | Stop reason: SDUPTHER

## 2020-05-31 RX ORDER — ATORVASTATIN CALCIUM 40 MG/1
40 TABLET, FILM COATED ORAL NIGHTLY
Status: DISCONTINUED | OUTPATIENT
Start: 2020-05-31 | End: 2020-06-02 | Stop reason: HOSPADM

## 2020-05-31 RX ORDER — PROPOFOL 10 MG/ML
INJECTION, EMULSION INTRAVENOUS PRN
Status: DISCONTINUED | OUTPATIENT
Start: 2020-05-31 | End: 2020-05-31 | Stop reason: SDUPTHER

## 2020-05-31 RX ADMIN — MORPHINE SULFATE 4 MG: 4 INJECTION, SOLUTION INTRAMUSCULAR; INTRAVENOUS at 21:15

## 2020-05-31 RX ADMIN — LIDOCAINE HYDROCHLORIDE 100 MG: 20 INJECTION, SOLUTION INFILTRATION; PERINEURAL at 19:01

## 2020-05-31 RX ADMIN — MORPHINE SULFATE 4 MG: 4 INJECTION, SOLUTION INTRAMUSCULAR; INTRAVENOUS at 18:07

## 2020-05-31 RX ADMIN — SUGAMMADEX 200 MG: 100 INJECTION, SOLUTION INTRAVENOUS at 20:20

## 2020-05-31 RX ADMIN — CEFOXITIN SODIUM 1 G: 1 POWDER, FOR SOLUTION INTRAVENOUS at 22:47

## 2020-05-31 RX ADMIN — SODIUM CHLORIDE 1000 ML: 9 INJECTION, SOLUTION INTRAVENOUS at 14:41

## 2020-05-31 RX ADMIN — FENTANYL CITRATE 100 MCG: 50 INJECTION, SOLUTION INTRAMUSCULAR; INTRAVENOUS at 19:12

## 2020-05-31 RX ADMIN — ACETAMINOPHEN 650 MG: 325 TABLET ORAL at 14:11

## 2020-05-31 RX ADMIN — MORPHINE SULFATE 4 MG: 4 INJECTION, SOLUTION INTRAMUSCULAR; INTRAVENOUS at 23:19

## 2020-05-31 RX ADMIN — PROPOFOL 200 MG: 10 INJECTION, EMULSION INTRAVENOUS at 19:01

## 2020-05-31 RX ADMIN — KETOROLAC TROMETHAMINE 30 MG: 30 INJECTION, SOLUTION INTRAMUSCULAR at 20:35

## 2020-05-31 RX ADMIN — METOPROLOL TARTRATE 50 MG: 50 TABLET, FILM COATED ORAL at 21:19

## 2020-05-31 RX ADMIN — IOPAMIDOL 80 ML: 755 INJECTION, SOLUTION INTRAVENOUS at 15:10

## 2020-05-31 RX ADMIN — DILTIAZEM HYDROCHLORIDE 60 MG: 30 TABLET, FILM COATED ORAL at 21:19

## 2020-05-31 RX ADMIN — FENTANYL CITRATE 100 MCG: 50 INJECTION, SOLUTION INTRAMUSCULAR; INTRAVENOUS at 19:53

## 2020-05-31 RX ADMIN — SODIUM CHLORIDE: 9 INJECTION, SOLUTION INTRAVENOUS at 21:22

## 2020-05-31 RX ADMIN — ONDANSETRON HYDROCHLORIDE 4 MG: 4 INJECTION, SOLUTION INTRAMUSCULAR; INTRAVENOUS at 19:12

## 2020-05-31 RX ADMIN — ATORVASTATIN CALCIUM 40 MG: 40 TABLET, FILM COATED ORAL at 21:19

## 2020-05-31 RX ADMIN — SODIUM CHLORIDE: 9 INJECTION, SOLUTION INTRAVENOUS at 18:56

## 2020-05-31 RX ADMIN — SODIUM CHLORIDE: 9 INJECTION, SOLUTION INTRAVENOUS at 18:07

## 2020-05-31 RX ADMIN — DEXAMETHASONE SODIUM PHOSPHATE 10 MG: 4 INJECTION, SOLUTION INTRAMUSCULAR; INTRAVENOUS at 19:12

## 2020-05-31 RX ADMIN — ROCURONIUM BROMIDE 30 MG: 10 INJECTION INTRAVENOUS at 19:05

## 2020-05-31 RX ADMIN — SODIUM CHLORIDE: 9 INJECTION, SOLUTION INTRAVENOUS at 19:55

## 2020-05-31 RX ADMIN — CEFOXITIN SODIUM 1 G: 1 POWDER, FOR SOLUTION INTRAVENOUS at 16:12

## 2020-05-31 RX ADMIN — SUCCINYLCHOLINE CHLORIDE 140 MG: 20 INJECTION, SOLUTION INTRAMUSCULAR; INTRAVENOUS at 19:01

## 2020-05-31 RX ADMIN — ROCURONIUM BROMIDE 20 MG: 10 INJECTION INTRAVENOUS at 19:53

## 2020-05-31 ASSESSMENT — PAIN DESCRIPTION - LOCATION
LOCATION: ABDOMEN

## 2020-05-31 ASSESSMENT — ENCOUNTER SYMPTOMS
RHINORRHEA: 0
SHORTNESS OF BREATH: 0
VOMITING: 0
CONSTIPATION: 0
VOMITING: 1
ABDOMINAL DISTENTION: 0
SORE THROAT: 0
BLOOD IN STOOL: 0
CONSTIPATION: 1
COUGH: 0
EYE ITCHING: 0
NAUSEA: 1
DIARRHEA: 0
ANAL BLEEDING: 0
EYE REDNESS: 0
ABDOMINAL PAIN: 1
NAUSEA: 0

## 2020-05-31 ASSESSMENT — PAIN SCALES - GENERAL
PAINLEVEL_OUTOF10: 7
PAINLEVEL_OUTOF10: 5
PAINLEVEL_OUTOF10: 7
PAINLEVEL_OUTOF10: 5
PAINLEVEL_OUTOF10: 4
PAINLEVEL_OUTOF10: 4
PAINLEVEL_OUTOF10: 10
PAINLEVEL_OUTOF10: 7
PAINLEVEL_OUTOF10: 8
PAINLEVEL_OUTOF10: 10

## 2020-05-31 ASSESSMENT — PAIN DESCRIPTION - FREQUENCY
FREQUENCY: CONTINUOUS
FREQUENCY: CONTINUOUS

## 2020-05-31 ASSESSMENT — PAIN DESCRIPTION - ORIENTATION
ORIENTATION: RIGHT;LOWER
ORIENTATION: RIGHT;MID
ORIENTATION: RIGHT;LOWER

## 2020-05-31 ASSESSMENT — PULMONARY FUNCTION TESTS
PIF_VALUE: 23
PIF_VALUE: 0
PIF_VALUE: 31
PIF_VALUE: 32
PIF_VALUE: 31
PIF_VALUE: 17
PIF_VALUE: 2
PIF_VALUE: 30
PIF_VALUE: 25
PIF_VALUE: 23
PIF_VALUE: 9
PIF_VALUE: 19
PIF_VALUE: 30
PIF_VALUE: 24
PIF_VALUE: 31
PIF_VALUE: 25
PIF_VALUE: 27
PIF_VALUE: 24
PIF_VALUE: 24
PIF_VALUE: 1
PIF_VALUE: 32
PIF_VALUE: 0
PIF_VALUE: 31
PIF_VALUE: 26
PIF_VALUE: 31
PIF_VALUE: 25
PIF_VALUE: 0
PIF_VALUE: 35
PIF_VALUE: 31
PIF_VALUE: 31
PIF_VALUE: 23
PIF_VALUE: 23
PIF_VALUE: 19
PIF_VALUE: 23
PIF_VALUE: 31
PIF_VALUE: 26
PIF_VALUE: 31
PIF_VALUE: 30
PIF_VALUE: 24
PIF_VALUE: 24
PIF_VALUE: 20
PIF_VALUE: 25
PIF_VALUE: 20
PIF_VALUE: 24
PIF_VALUE: 24
PIF_VALUE: 17
PIF_VALUE: 24
PIF_VALUE: 25
PIF_VALUE: 24
PIF_VALUE: 29
PIF_VALUE: 19
PIF_VALUE: 26
PIF_VALUE: 3
PIF_VALUE: 32
PIF_VALUE: 27
PIF_VALUE: 20
PIF_VALUE: 24
PIF_VALUE: 31
PIF_VALUE: 23
PIF_VALUE: 30
PIF_VALUE: 24
PIF_VALUE: 23
PIF_VALUE: 17
PIF_VALUE: 20
PIF_VALUE: 24
PIF_VALUE: 30
PIF_VALUE: 30
PIF_VALUE: 26
PIF_VALUE: 27
PIF_VALUE: 19
PIF_VALUE: 29
PIF_VALUE: 13
PIF_VALUE: 31
PIF_VALUE: 23
PIF_VALUE: 20
PIF_VALUE: 0
PIF_VALUE: 31
PIF_VALUE: 31
PIF_VALUE: 0
PIF_VALUE: 21
PIF_VALUE: 31
PIF_VALUE: 23
PIF_VALUE: 21
PIF_VALUE: 0
PIF_VALUE: 23
PIF_VALUE: 31
PIF_VALUE: 30

## 2020-05-31 ASSESSMENT — PAIN DESCRIPTION - PAIN TYPE
TYPE: SURGICAL PAIN
TYPE: ACUTE PAIN
TYPE: ACUTE PAIN
TYPE: SURGICAL PAIN

## 2020-05-31 ASSESSMENT — PAIN - FUNCTIONAL ASSESSMENT
PAIN_FUNCTIONAL_ASSESSMENT: ACTIVITIES ARE NOT PREVENTED

## 2020-05-31 ASSESSMENT — PAIN DESCRIPTION - DESCRIPTORS
DESCRIPTORS: CONSTANT
DESCRIPTORS: TENDER;SHARP;DISCOMFORT
DESCRIPTORS: SORE;ACHING
DESCRIPTORS: SORE

## 2020-05-31 ASSESSMENT — PAIN DESCRIPTION - ONSET: ONSET: ON-GOING

## 2020-05-31 ASSESSMENT — PAIN DESCRIPTION - PROGRESSION: CLINICAL_PROGRESSION: GRADUALLY WORSENING

## 2020-05-31 NOTE — ANESTHESIA PRE PROCEDURE
Department of Anesthesiology  Preprocedure Note       Name:  Jeannie Franco   Age:  54 y.o.  :  1970                                          MRN:  [id number]         Date:  2020      Surgeon: Claudia Crum):  Ruthie Felipe MD    Procedure: Procedure(s):  APPENDECTOMY LAPAROSCOPIC    Medications prior to admission:   Prior to Admission medications    Medication Sig Start Date End Date Taking? Authorizing Provider   acetaminophen (TYLENOL) 500 MG tablet Take 500 mg by mouth every 6 hours as needed for Pain   Yes Historical Provider, MD   apixaban (ELIQUIS) 5 MG TABS tablet Take 1 tablet by mouth 2 times daily 20  Yes Ruiz Castaneda, APRN - CNP   diltiazem (CARDIZEM) 60 MG tablet Take 60 mg by mouth 2 times daily   Yes Historical Provider, MD   atorvastatin (LIPITOR) 40 MG tablet Take 40 mg by mouth daily   Yes Historical Provider, MD   hydrOXYzine (VISTARIL) 25 MG capsule Take 1 capsule by mouth 3 times daily as needed for Anxiety 19  Yes Aleksey Castillo DO   losartan (COZAAR) 25 MG tablet Take 50 mg by mouth daily    Yes Historical Provider, MD   aspirin 81 MG chewable tablet Take 1 tablet by mouth daily 16  Yes Lorena Rangel MD   metoprolol tartrate (LOPRESSOR) 50 MG tablet Take 1 tablet by mouth 2 times daily 16  Yes Lorena Rangel MD   bumetanide (BUMEX) 2 MG tablet Take 1 tablet by mouth daily  Patient taking differently: Take 2 mg by mouth daily as needed  16  Yes Lorena Rangel MD   potassium chloride (KLOR-CON M) 20 MEQ extended release tablet Take 1 tablet by mouth daily 16  Yes Lorena Rangel MD   nitroGLYCERIN (NITROSTAT) 0.4 MG SL tablet Dissolve 1 tablet under tongue for chest pain and repeat every 5 min up to max of 3 total doses.   If no relief after 3 doses call 911 16   Lorena Rangel MD       Current medications:    Current Facility-Administered Medications   Medication Dose Route Frequency Provider Last Rate Last Dose    atorvastatin (LIPITOR) tablet 40 mg  40 mg Oral Nightly Diallo Hall MD        bumetanide (BUMEX) tablet 2 mg  2 mg Oral Daily Diallo Hall MD        dilTIAZem (CARDIZEM) tablet 60 mg  60 mg Oral BID Diallo Hall MD        hydrOXYzine (VISTARIL) capsule 25 mg  25 mg Oral TID PRN Diallo Hall MD        losartan (COZAAR) tablet 50 mg  50 mg Oral Daily Diallo Hall MD        metoprolol tartrate (LOPRESSOR) tablet 50 mg  50 mg Oral BID Diallo Hall MD        nitroGLYCERIN (NITROSTAT) SL tablet 0.4 mg  0.4 mg Sublingual Q5 Min PRN Diallo Hall MD        potassium chloride (KLOR-CON M) extended release tablet 20 mEq  20 mEq Oral Daily Diallo Hall MD        0.9 % sodium chloride infusion   Intravenous Continuous Diallo Hall  mL/hr at 05/31/20 1807      sodium chloride flush 0.9 % injection 10 mL  10 mL Intravenous 2 times per day Diallo Hall MD        sodium chloride flush 0.9 % injection 10 mL  10 mL Intravenous PRN Diallo Hall MD        ondansetron [facility]) injection 4 mg  4 mg Intravenous Q6H PRN Diallo Hall MD        morphine (PF) injection 2 mg  2 mg Intravenous Q2H PRN GIOVANNI Diaz        Or    morphine injection 4 mg  4 mg Intravenous Q2H PRN Kwesi Hackett PA   4 mg at 05/31/20 1807       Allergies:     Allergies   Allergen Reactions    Albuterol      Per patient raises my blood pressure       Problem List:    Patient Active Problem List   Diagnosis Code    Persistent atrial fibrillation I48.19    Episodic headache R51    Cerebrovascular accident (CVA) due to embolism of left middle cerebral artery (HCC) M05.463    CHF (NYHA class III, ACC/AHA stage C) ([facility]) I50.9    MR (mitral regurgitation) I34.0    Atrial fibrillation ([facility]) I48.91    Appendicitis K37    Abdominal pain, right lower quadrant R10.31       Past Medical History:        Diagnosis Date    Atrial fibrillation ([address]     Cerebral artery occlusion with

## 2020-05-31 NOTE — ED TRIAGE NOTES
Patient ambulated to room with complaint of abdominal pain that started yesterday. States it started in mid abdomen and today is right middle. States it is sharp and tender to touch. Also states that he has had constipation.  Last BM was today but last 3 episodes have been very small

## 2020-05-31 NOTE — ED NOTES
ED to inpatient nurses report    Chief Complaint   Patient presents with    Abdominal Pain     middle and right    Constipation      Present to ED from home  LOC: alert and orientated to name, place, date  Vital signs   Vitals:    05/31/20 1228 05/31/20 1234 05/31/20 1431   BP: (!) 161/92 (!) 147/88 134/85   Pulse: 98     Resp: 16     Temp: 96.7 °F (35.9 °C)     TempSrc: Temporal     SpO2: 99%  100%   Weight: 242 lb (109.8 kg)     Height: 5' 9\" (1.753 m)        Oxygen Baseline ra    Current needs required none Bipap/Cpap No  LDAs:   Peripheral IV 05/31/20 Right Antecubital (Active)   Site Assessment Clean; Intact;Dry 5/31/2020  2:32 PM   Dressing Status Clean;Dry; Intact 5/31/2020  2:32 PM   Dressing Intervention New 5/31/2020  2:32 PM     Mobility: Independent  Pending ED orders: none  Present condition: stable    Electronically signed by Antwan Espino RN on 5/31/2020 at 4:52 PM       Antwan Esipno [facility]  05/31/20 9768

## 2020-05-31 NOTE — ED PROVIDER NOTES
[address]       Chief Complaint   Patient presents with    Abdominal Pain     middle and right    Constipation       Nurses Notes reviewed and I agree except as noted in the HPI. HISTORY OF PRESENT ILLNESS   Leighton Chen is a 54 y.o. male who presents for evaluation abdominal pain, RLQ 5/10 and and constipation. Patient states that his symptoms started yesterday and his pain was a 10 /10. Since onset of symptoms patient has taken Tylenol and Tums with no relief. Patient states that he has a decreased appetite. Patient states that his bowels are moving but he is going a little at a time. Patient admits to alcohol socially and denies any narcotic drug use. Patient denies any previous abdominal surgeries or injuries. Patient/patient representative denies any foreign or domestic travel in the last 30 days. Patient /patient representative denies exposure to those with similar symptoms. Patient/patient representative denies contact with someone who was confirmed or suspected to have Coronavirus / COVID-19 within the last month. REVIEW OF SYSTEMS     Review of Systems   Constitutional: Positive for appetite change (decreased). Negative for chills and fever. Eyes: Negative for redness and itching. Respiratory: Negative for cough and shortness of breath. Cardiovascular: Negative for chest pain and palpitations. Gastrointestinal: Positive for abdominal pain and constipation. Negative for abdominal distention, anal bleeding, blood in stool, diarrhea, nausea and vomiting. Genitourinary: Negative for dysuria and hematuria. Musculoskeletal: Negative for joint swelling and myalgias. Skin: Negative for rash. Allergic/Immunologic: Negative for environmental allergies and food allergies. Neurological: Negative for dizziness and headaches.        PAST MEDICAL HISTORY         Diagnosis Date    Atrial fibrillation ([facility] [address]     report has been created using voice recognition software. It may contain minor errors which are inherent in voice recognition technology. **      Final report electronically signed by Dr Torrie Murrieta on 5/31/2020 12:55 PM        URGENT CARE COURSE:     Vitals:    05/31/20 1228 05/31/20 1234   BP: (!) 161/92 (!) 147/88   Pulse: 98    Resp: 16    Temp: 96.7 °F (35.9 °C)    TempSrc: Temporal    SpO2: 99%    Weight: 242 lb (109.8 kg)    Height: 5' 9\" (1.753 m)        Medications - No data to display  PROCEDURES:  FINALIMPRESSION      1. Abdominal pain, right lower quadrant        DISPOSITION/PLAN   DISPOSITION    Transfer    After reviewing the patients History of Present illness, Vital Signs,Clinical Findings,Comorbities, and Clinical Data obtained I discussed with the patient or patient representative that there is a very real potential for serious injury / illness and the patient will need to be transfer to a level of higher care for further evaluation and continued care. It was explained that this would provide the best care for the patient. The patient/patient representative are agreeable to the treatment plan and are agreeable to be transferred therefore, the patient will be transferred to [facility] ED for reevaluation and further management .           Problem List Items Addressed This Visit     None      Visit Diagnoses     Abdominal pain, right lower quadrant    -  Primary          PATIENT REFERRED TO:  [facility], EMERGENCY DEPT  [address] [facility],[address]      [facility], for further evalation      Roger Marie, [address], APRN - CNP  05/31/20 2486

## 2020-05-31 NOTE — ED NOTES
Dr. Ruth Monte at bedside     Jessicaleroy Alan, [facility]  05/31/20 3661

## 2020-05-31 NOTE — ED PROVIDER NOTES
for difficulty urinating. Musculoskeletal: Negative for arthralgias. Skin: Negative for rash. Neurological: Negative for seizures, syncope, numbness and headaches. Hematological: Negative for adenopathy. All other systems reviewed and are negative. PAST MEDICAL HISTORY    has a past medical history of Atrial fibrillation ([facility] [address] Cerebral artery occlusion with cerebral infarction ([facility] [address] CHF (NYHA class III, ACC/AHA stage C) ([facility] [address] Episodic headache, Hyperlipidemia, and Hypertension. SURGICAL HISTORY      has a past surgical history that includes Tonsillectomy; Tonsillectomy; and Cardioversion (2019). CURRENT MEDICATIONS       Previous Medications    ACETAMINOPHEN (TYLENOL) 500 MG TABLET    Take 500 mg by mouth every 6 hours as needed for Pain    APIXABAN (ELIQUIS) 5 MG TABS TABLET    Take 1 tablet by mouth 2 times daily    ASPIRIN 81 MG CHEWABLE TABLET    Take 1 tablet by mouth daily    ATORVASTATIN (LIPITOR) 40 MG TABLET    Take 40 mg by mouth daily    BUMETANIDE (BUMEX) 2 MG TABLET    Take 1 tablet by mouth daily    DILTIAZEM (CARDIZEM) 60 MG TABLET    Take 60 mg by mouth 2 times daily    HYDROXYZINE (VISTARIL) 25 MG CAPSULE    Take 1 capsule by mouth 3 times daily as needed for Anxiety    LOSARTAN (COZAAR) 25 MG TABLET    Take 50 mg by mouth daily     METOPROLOL TARTRATE (LOPRESSOR) 50 MG TABLET    Take 1 tablet by mouth 2 times daily    NITROGLYCERIN (NITROSTAT) 0.4 MG SL TABLET    Dissolve 1 tablet under tongue for chest pain and repeat every 5 min up to max of 3 total doses. If no relief after 3 doses call 911    POTASSIUM CHLORIDE (KLOR-CON M) 20 MEQ EXTENDED RELEASE TABLET    Take 1 tablet by mouth daily       ALLERGIES     is allergic to albuterol. FAMILY HISTORY     He indicated that his mother is alive. He indicated that his father is alive. He indicated that the status of his sister is unknown. He indicated that all of his four brothers are alive.  He indicated that the status of his maternal grandmother is unknown. He indicated that the status of his maternal aunt is unknown.   family history includes Cancer in his maternal aunt; Heart Disease in his father; Heart Failure in his maternal grandmother. SOCIAL HISTORY      reports that he quit smoking about 20 years ago. He has a 2.00 pack-year smoking history. He has never used smokeless tobacco. He reports current alcohol use. He reports previous drug use. Drug: Marijuana. PHYSICAL EXAM     INITIAL VITALS:  height is 5' 9\" (1.753 m) and weight is 242 lb (109.8 kg). His temporal temperature is 96.7 °F (35.9 °C). His blood pressure is 134/85 and his pulse is 98. His respiration is 16 and oxygen saturation is 100%. CONSTITUTIONAL: [Awake, alert, non toxic, well developed, well nourished, no acute distress]  HEAD: [Normocephalic, atraumatic]  EYES: [Pupils equal, round & reactive to light, extraocular movements intact, no nystagmus, clear conjunctiva, non-icteric sclera]  ENT: [External ear canal clear without evidence of cerumen impaction or foreign body, TM's clear without erythema or bulging. Nares patent without drainage, septum appears midline. Moist mucus membranes, oropharynx clear without exudate, erythema, or mass. Uvula midline]  NECK: [Nontender and supple. No meningismus, no appreciated lymphadenopathy. Intact full range of motion. C-spine midline without vertebral tenderness. Trachea midline.]  CHEST: [Inspection normal, no lesions, equal rise. No crepitus or tenderness upon palpation.]  CARDIOVASCULAR: [Regular rate, rhythm, normal S1 and S2. No appreciated murmurs, rubs, or gallops. No pulse deficits appreciated. Intact distal perfusion. JVD not appreciated.]  PULMONARY: [Respiratory distress absent. Respiratory effort normal. Breath sounds clear to auscultation without rhonchi, rales, or wheezing. No accessory muscle use. No stridor]  ABDOMEN: [Inspection normal, without surgical scars.  Soft, moderate tenderness to

## 2020-06-01 LAB
BASOPHILS # BLD: 0.1 %
BASOPHILS ABSOLUTE: 0 THOU/MM3 (ref 0–0.1)
EOSINOPHIL # BLD: 0 %
EOSINOPHILS ABSOLUTE: 0 THOU/MM3 (ref 0–0.4)
ERYTHROCYTE [DISTWIDTH] IN BLOOD BY AUTOMATED COUNT: 13 % (ref 11.5–14.5)
ERYTHROCYTE [DISTWIDTH] IN BLOOD BY AUTOMATED COUNT: 44.7 FL (ref 35–45)
HCT VFR BLD CALC: 41 % (ref 42–52)
HEMOGLOBIN: 13.5 GM/DL (ref 14–18)
IMMATURE GRANS (ABS): 0.03 THOU/MM3 (ref 0–0.07)
IMMATURE GRANULOCYTES: 0.3 %
LYMPHOCYTES # BLD: 7.1 %
LYMPHOCYTES ABSOLUTE: 0.8 THOU/MM3 (ref 1–4.8)
MCH RBC QN AUTO: 30.8 PG (ref 26–33)
MCHC RBC AUTO-ENTMCNC: 32.9 GM/DL (ref 32.2–35.5)
MCV RBC AUTO: 93.4 FL (ref 80–94)
MONOCYTES # BLD: 4.1 %
MONOCYTES ABSOLUTE: 0.5 THOU/MM3 (ref 0.4–1.3)
NUCLEATED RED BLOOD CELLS: 0 /100 WBC
PLATELET # BLD: 229 THOU/MM3 (ref 130–400)
PMV BLD AUTO: 9.8 FL (ref 9.4–12.4)
RBC # BLD: 4.39 MILL/MM3 (ref 4.7–6.1)
SEG NEUTROPHILS: 88.4 %
SEGMENTED NEUTROPHILS ABSOLUTE COUNT: 10.2 THOU/MM3 (ref 1.8–7.7)
WBC # BLD: 11.5 THOU/MM3 (ref 4.8–10.8)

## 2020-06-01 PROCEDURE — G0378 HOSPITAL OBSERVATION PER HR: HCPCS

## 2020-06-01 PROCEDURE — 6360000002 HC RX W HCPCS: Performed by: SURGERY

## 2020-06-01 PROCEDURE — 96376 TX/PRO/DX INJ SAME DRUG ADON: CPT

## 2020-06-01 PROCEDURE — APPSS30 APP SPLIT SHARED TIME 16-30 MINUTES: Performed by: NURSE PRACTITIONER

## 2020-06-01 PROCEDURE — 96374 THER/PROPH/DIAG INJ IV PUSH: CPT

## 2020-06-01 PROCEDURE — 6370000000 HC RX 637 (ALT 250 FOR IP): Performed by: SURGERY

## 2020-06-01 PROCEDURE — 85025 COMPLETE CBC W/AUTO DIFF WBC: CPT

## 2020-06-01 PROCEDURE — 1200000000 HC SEMI PRIVATE

## 2020-06-01 PROCEDURE — 99024 POSTOP FOLLOW-UP VISIT: CPT | Performed by: SURGERY

## 2020-06-01 PROCEDURE — 6370000000 HC RX 637 (ALT 250 FOR IP): Performed by: NURSE PRACTITIONER

## 2020-06-01 PROCEDURE — 36415 COLL VENOUS BLD VENIPUNCTURE: CPT

## 2020-06-01 PROCEDURE — 2580000003 HC RX 258: Performed by: SURGERY

## 2020-06-01 RX ORDER — OXYCODONE HYDROCHLORIDE AND ACETAMINOPHEN 5; 325 MG/1; MG/1
1 TABLET ORAL EVERY 4 HOURS PRN
Status: DISCONTINUED | OUTPATIENT
Start: 2020-06-01 | End: 2020-06-02 | Stop reason: HOSPADM

## 2020-06-01 RX ORDER — POLYETHYLENE GLYCOL 3350 17 G/17G
17 POWDER, FOR SOLUTION ORAL DAILY PRN
Status: DISCONTINUED | OUTPATIENT
Start: 2020-06-01 | End: 2020-06-02 | Stop reason: HOSPADM

## 2020-06-01 RX ORDER — DOCUSATE SODIUM 100 MG/1
100 CAPSULE, LIQUID FILLED ORAL DAILY
Status: DISCONTINUED | OUTPATIENT
Start: 2020-06-01 | End: 2020-06-02 | Stop reason: HOSPADM

## 2020-06-01 RX ORDER — MORPHINE SULFATE 2 MG/ML
2 INJECTION, SOLUTION INTRAMUSCULAR; INTRAVENOUS EVERY 4 HOURS PRN
Status: DISCONTINUED | OUTPATIENT
Start: 2020-06-01 | End: 2020-06-02 | Stop reason: HOSPADM

## 2020-06-01 RX ADMIN — MORPHINE SULFATE 4 MG: 4 INJECTION, SOLUTION INTRAMUSCULAR; INTRAVENOUS at 04:05

## 2020-06-01 RX ADMIN — CEFOXITIN SODIUM 1 G: 1 POWDER, FOR SOLUTION INTRAVENOUS at 16:11

## 2020-06-01 RX ADMIN — ATORVASTATIN CALCIUM 40 MG: 40 TABLET, FILM COATED ORAL at 19:34

## 2020-06-01 RX ADMIN — METOPROLOL TARTRATE 50 MG: 50 TABLET, FILM COATED ORAL at 07:31

## 2020-06-01 RX ADMIN — MORPHINE SULFATE 4 MG: 4 INJECTION, SOLUTION INTRAMUSCULAR; INTRAVENOUS at 02:01

## 2020-06-01 RX ADMIN — LOSARTAN POTASSIUM 50 MG: 50 TABLET, FILM COATED ORAL at 07:31

## 2020-06-01 RX ADMIN — MORPHINE SULFATE 2 MG: 2 INJECTION, SOLUTION INTRAMUSCULAR; INTRAVENOUS at 08:13

## 2020-06-01 RX ADMIN — DILTIAZEM HYDROCHLORIDE 60 MG: 30 TABLET, FILM COATED ORAL at 19:34

## 2020-06-01 RX ADMIN — OXYCODONE HYDROCHLORIDE AND ACETAMINOPHEN 1 TABLET: 5; 325 TABLET ORAL at 15:14

## 2020-06-01 RX ADMIN — OXYCODONE HYDROCHLORIDE AND ACETAMINOPHEN 1 TABLET: 5; 325 TABLET ORAL at 23:39

## 2020-06-01 RX ADMIN — SODIUM CHLORIDE: 9 INJECTION, SOLUTION INTRAVENOUS at 09:51

## 2020-06-01 RX ADMIN — CEFOXITIN SODIUM 1 G: 1 POWDER, FOR SOLUTION INTRAVENOUS at 04:05

## 2020-06-01 RX ADMIN — CEFOXITIN SODIUM 1 G: 1 POWDER, FOR SOLUTION INTRAVENOUS at 09:50

## 2020-06-01 RX ADMIN — MORPHINE SULFATE 4 MG: 4 INJECTION, SOLUTION INTRAMUSCULAR; INTRAVENOUS at 06:12

## 2020-06-01 RX ADMIN — POTASSIUM CHLORIDE 20 MEQ: 1500 TABLET, EXTENDED RELEASE ORAL at 07:35

## 2020-06-01 RX ADMIN — METOPROLOL TARTRATE 50 MG: 50 TABLET, FILM COATED ORAL at 19:34

## 2020-06-01 RX ADMIN — DOCUSATE SODIUM 100 MG: 100 CAPSULE, LIQUID FILLED ORAL at 10:18

## 2020-06-01 RX ADMIN — CEFOXITIN SODIUM 1 G: 1 POWDER, FOR SOLUTION INTRAVENOUS at 22:57

## 2020-06-01 RX ADMIN — DILTIAZEM HYDROCHLORIDE 60 MG: 30 TABLET, FILM COATED ORAL at 07:31

## 2020-06-01 RX ADMIN — OXYCODONE HYDROCHLORIDE AND ACETAMINOPHEN 1 TABLET: 5; 325 TABLET ORAL at 19:34

## 2020-06-01 RX ADMIN — OXYCODONE HYDROCHLORIDE AND ACETAMINOPHEN 1 TABLET: 5; 325 TABLET ORAL at 10:16

## 2020-06-01 ASSESSMENT — PAIN SCALES - GENERAL
PAINLEVEL_OUTOF10: 7
PAINLEVEL_OUTOF10: 4
PAINLEVEL_OUTOF10: 6
PAINLEVEL_OUTOF10: 6
PAINLEVEL_OUTOF10: 7
PAINLEVEL_OUTOF10: 6
PAINLEVEL_OUTOF10: 6
PAINLEVEL_OUTOF10: 7
PAINLEVEL_OUTOF10: 7
PAINLEVEL_OUTOF10: 8
PAINLEVEL_OUTOF10: 4
PAINLEVEL_OUTOF10: 4
PAINLEVEL_OUTOF10: 5
PAINLEVEL_OUTOF10: 6
PAINLEVEL_OUTOF10: 6

## 2020-06-01 ASSESSMENT — PAIN DESCRIPTION - ORIENTATION
ORIENTATION: RIGHT;LOWER
ORIENTATION: RIGHT;LOWER

## 2020-06-01 ASSESSMENT — PAIN DESCRIPTION - PAIN TYPE
TYPE: SURGICAL PAIN

## 2020-06-01 ASSESSMENT — PAIN DESCRIPTION - LOCATION
LOCATION: ABDOMEN

## 2020-06-01 ASSESSMENT — PAIN DESCRIPTION - DESCRIPTORS
DESCRIPTORS: SORE
DESCRIPTORS: CONSTANT;SHARP
DESCRIPTORS: SORE

## 2020-06-01 NOTE — PLAN OF CARE
Problem: Pain Control  Goal: Maintain pain level at or below patient's acceptable level (or 5 if patient is unable to determine acceptable level)  6/1/2020 1056 by Selvin Shafer RN  Outcome: Ongoing  Note: Patient has a pain goal of 4/10 and is able to meet goal with ice pack to abdomen and IV pain medication. Patient switched to oral pain medication. Problem: GI  Goal: No bowel complications  Outcome: Ongoing  Note: Patient has a rounded abdomen with active bowel sounds. Patient denies any nausea. Patient denies passing any flatus. Patient started on colace and has miralax dialy if needed   Care plan reviewed with patient. Patient verbalize understanding of the plan of care and contribute to goal setting.

## 2020-06-01 NOTE — DISCHARGE INSTR - OTHER ORDERS
shower daily with chlorhexadine soap starting to wash genty over abdominal incisions then neck down to help prevent surgical site infections

## 2020-06-01 NOTE — H&P
[address] [place] [address]                              HISTORY AND PHYSICAL    PATIENT NAME: Dwight Parson                     :        1970  MED REC NO:   [id number]                           ROOM:       1129  ACCOUNT NO:   [de-identified]                           ADMIT DATE: 2020  PROVIDER:     Nanette Hood. Bart Izaguirre MD      CHIEF COMPLAINT:  Acute appendicitis. HISTORY OF PRESENT ILLNESS:  The patient is a 44-year-old white male who  does have a history of atrial fibrillation, who underwent atrial  ablation this past 2020, who is followed by Dr. Laine Flores and is on  Eliquis. He did not take his dose last evening nor this morning with  his last dose yesterday morning. Nonetheless, he had onset of abdominal  pain yesterday afternoon. He did have an episode of vomiting, but the  pain persisted throughout the day and he presented to the emergency room  for evaluation. Pain persisted in the right lower quadrant. He denies  any prior history of similar pain. He has had no urinary tract  symptomatology. He has had no change of his bowel habits. The patient  underwent a CT scan being read as acute appendicitis, incidentally has  bilateral inguinal hernias. Surgical consultation has been requested. PAST MEDICAL HISTORY:  Positive for the atrial ablation. He has  apparently had a CVA due to an embolus of the left middle cerebral  artery in the past.  He has a history of congestive heart failure due to  the atrial fibrillation and a history of mitral regurgitation. PAST SURGICAL HISTORY:  Includes a tonsillectomy and UPPP for snoring  and then the cardioversion as mentioned above. MEDICATIONS:  Include Tylenol, Eliquis, Cardizem, Lipitor, Vistaril,  Cozaar, aspirin, Lopressor, Bumex, Klor-Con and Nitrostat. SOCIAL HISTORY:  He is a nonsmoker.   He apparently may have a history of  alcohol abuse, but none

## 2020-06-01 NOTE — PLAN OF CARE
Problem: Respiratory  Goal: No pulmonary complications  Outcome: Met This Shift  Goal: O2 Sat > 90%  Outcome: Met This Shift  Note: Sats >90% on RA, no increased WOB noted. Pt encouraged to take deep breaths and cough. Problem: Pain Control  Goal: Maintain pain level at or below patient's acceptable level (or 5 if patient is unable to determine acceptable level)  Outcome: Ongoing  Note: PRN pain medication given, relief noted. Ice packs to stomach as needed. Goal: Improvement in pain related behaviors BP/HR WNL  Outcome: Ongoing     Problem: Cardiovascular  Goal: No DVT, peripheral vascular complications  Outcome: Ongoing  Goal: Hemodynamic stability  Outcome: Ongoing     Problem:   Goal: Adequate urinary output  Outcome: Ongoing  Pt able to ambulate to bathroom without issue. Care plan reviewed with patient. Patient verbalize understanding of the plan of care and contribute to goal setting.

## 2020-06-01 NOTE — PROGRESS NOTES
2032 ARRIVED IN PACU FROM O R AFTER GENERAL ANESTHESIA . SEE FLOW SHEET . 2035 TORADOL GIVEN PER LOREN ARRIAZA FOR POST PAIN  2050 PT. RESTS QUIETLY. SLEEPS . NO COMPLAINTS. JUST HAD PAIN WHEN COUGHED OTHERWISE NO COMPLAINTS  2055 REPORT CALLED TO EMILIANA HICKS. DR. Becky Ward TRIED TO CALL PT'S WIFE AND SAID THERE WAS NO ANSWER AND NOT OUT IN WAITING ROOM    2105 RETURNED TO ROOM 6E55 IN STABLE CONDITION.

## 2020-06-01 NOTE — BRIEF OP NOTE
Brief Postoperative Note      Patient: Louise Saucedo  YOB: 1970  MRN: [id number]    Date of Procedure: 5/31/2020    Pre-Op Diagnosis: Appendicitis    Post-Op Diagnosis:        Procedure(s)  Attempted Lap.  Appendectomy  Converted to Open Appendectomy    Surgeon(s):  Kwesi Vera MD    Assistant:  Anesthesia: General    Estimated Blood Loss (mL): 30 ml    Complications: unable to perform laparoscopically  Specimens:   ID Type Source Tests Collected by Time Destination   A : mole from abdominal wall Tissue Abdomen SURGICAL PATHOLOGY Kwesi Vera MD 5/31/2020 1923    B : appendix Tissue Appendix SURGICAL PATHOLOGY Kwesi Vera MD 5/31/2020 1928        Implants:        Drains: none    Findings: see op note    Electronically signed by Kwesi Vera MD on 5/31/2020 at 8:35 PM

## 2020-06-01 NOTE — OP NOTE
closure was begun. 0 Vicryl was used to close the  peritoneum, #1 Vicryl was used to close the fascia. Interrupted 3-0  Vicryl was used to close the subcutaneous before which we continued  irrigating with some Irrisept and then staples were used to close the  skin and also staples were used to close the port sites. The patient  tolerated the procedure well. DAKSHA MOORE [facility], MD    D: 05/31/2020 20:59:19       T: 05/31/2020 21:02:17     [email or web address]  Job#: [id number]     Doc#: [id number]    CC:

## 2020-06-01 NOTE — FLOWSHEET NOTE
06/01/20 1036   Encounter Summary   Services provided to: Patient   [facility]   Support System Spouse; Children;Family members   Place of [employer] STRZ none   Continue Visiting Yes  (6/1/2020 continue support)   Complexity of Encounter Moderate   Length of Encounter 15 minutes   Spiritual Assessment Completed Yes   Routine   Type Initial   Spiritual/Mandaen   Type Spiritual support   [facility] PRO[facility]ESS NOTE      Patient: [address]  Room #: 4Z-40/407-D            YOB: 1970  Age: 54 y.o. Gender: male            Admit Date & Time: 5/31/2020 12:27 PM    Assessment:  Patient is a 54year old male. Patient is lying in bed and is receptive to 's visit. Patient told this staff that he had surgery but is still in pain. Hope the mediation will help ease the pain from the surgery. Patient is  with teenagers at home. Patient is hopeful of getting back home soon to continue recuperating. Engaged in conversation. Patient has no Mormonism home at this time but he and his wife are searching for a Mormonism for the family. Interventions: This  had conversation with patient to seek area of spiritual and emotional needs.  also offered active listening and nurtured hope. Outcomes:  Pt is appreciative for the support and care being provided. Receptive to 's visit. Plan:  1. Will follow up with continue support and to learn how patient's family is coping with the health crisis in the family.      Electronically signed by Vern Núñez on 6/1/2020 at 6:19 PM.  3 [facility]  [phone]

## 2020-06-01 NOTE — PROGRESS NOTES
[facility]  Post Operative Progress Note  Dr Tye Morrison    Pt Name: Candelario Eldridge Record Number: [id number]  Date of Birth 1970   Today's Date: 6/1/2020    Hospital day # 1   POD # 1 s/p attempted laparoscopic appendectomy converted to open     Chief complaint: appendicitis    Patient was stable overnight. Chart reviewed. Updated by nursing staff. Denies chest discomfort or dyspnea. No N/V; (-) belching, flatus and BM. Tolerating DIET FULL LIQUID; diet. Pain controlled with analgesia. Up with assistance     Past, Family, Social History unchanged from admission. Diet:  DIET FULL LIQUID;    Medications:  Scheduled Meds:   atorvastatin  40 mg Oral Nightly    bumetanide  2 mg Oral Daily    dilTIAZem  60 mg Oral BID    losartan  50 mg Oral Daily    metoprolol tartrate  50 mg Oral BID    potassium chloride  20 mEq Oral Daily    sodium chloride flush  10 mL Intravenous 2 times per day    cefOXitin  1 g Intravenous Q6H     Continuous Infusions:   sodium chloride 125 mL/hr at 05/31/20 2122     PRN Meds:oxyCODONE-acetaminophen, morphine **OR** [DISCONTINUED] morphine, hydrOXYzine, nitroGLYCERIN, sodium chloride flush, ondansetron    Objective:    CBC:   Recent Labs     05/31/20  1410 06/01/20  0625   WBC 12.7* 11.5*   HGB 15.5 13.5*    229     BMP:    Recent Labs     05/31/20  1410      K 3.9   CL 99   CO2 27   BUN 7   CREATININE 0.9   GLUCOSE 97     Calcium:  Recent Labs     05/31/20  1410   CALCIUM 9.7     Ionized Calcium:No results for input(s): IONCA in the last 72 hours. Magnesium:No results for input(s): MG in the last 72 hours. Phosphorus:No results for input(s): PHOS in the last 72 hours. BNP:No results for input(s): BNP in the last 72 hours. Glucose:No results for input(s): POCGLU in the last 72 hours. HgbA1C: No results for input(s): LABA1C in the last 72 hours.   INR:   Recent Labs     05/31/20  1410   INR 1.21*     Hepatic:   Recent Labs 05/31/20  1410   ALKPHOS 83   ALT 20   AST 16   PROT 8.2*   BILITOT 0.5   LABALBU 4.6     Amylase and Lipase:No results for input(s): LACTA, AMYLASE in the last 72 hours. Lactic Acid: No results for input(s): LACTA in the last 72 hours. Troponin: No results for input(s): CKTOTAL, CKMB, TROPONINT in the last 72 hours. BNP: No results for input(s): BNP in the last 72 hours. Lipids: No results for input(s): CHOL, TRIG, HDL, LDLCALC in the last 72 hours. Invalid input(s): LDL  ABGs: No results found for: PH, PCO2, PO2, HCO3, O2SAT    Radiology reports as per the Radiologist  Radiology: Xr Abdomen (kub) (single Ap View)    Result Date: 5/31/2020  PROCEDURE: XR ABDOMEN (KUB) (SINGLE AP VIEW) CLINICAL INFORMATION: 73-year-old male with abdominal pain. Constipation. COMPARISON: No prior study. TECHNIQUE: 2 AP views of the abdomen were obtained in the supine position. FINDINGS: There are no distended bowel loops. There are no displaced bowel loops. There is no gross free air. No suspicious densities are present. No suspicious osseous lesions are present. Nonobstructive bowel gas pattern. **This report has been created using voice recognition software. It may contain minor errors which are inherent in voice recognition technology. ** Final report electronically signed by Dr Génesis Thompson on 5/31/2020 12:55 PM    Ct Abdomen Pelvis W Iv Contrast Additional Contrast? None    Result Date: 5/31/2020  PROCEDURE: CT ABDOMEN PELVIS W IV CONTRAST CLINICAL INFORMATION: RLQ pain, appendicitis . COMPARISON: 2/22/2010 TECHNIQUE: 2-D multiplanar postcontrast images of the abdomen and pelvis. Isovue-370. All CT scans at this facility use dose modulation, iterative reconstruction, and/or weight-based dosing when appropriate to reduce radiation dose to as low as reasonably achievable. FINDINGS: Abdomen and pelvis: The appendix is dilated and shows abnormal wall enhancement and significant periappendiceal inflammatory edema. Findings are compatible with acute appendicitis. No free air is seen to suggest perforation. No abscess formation is seen. Liver, and spleen are normal. Pancreas is normal. Gallbladder is normal. Adrenals and kidneys are normal. Aorta is unremarkable. No adenopathy identified. Pelvis No bowel obstruction. Bilateral fat-containing inguinal hernias. Urinary bladder is unremarkable. Bones No suspicious bone lesions. Lung bases: Lung bases are clear undersurface of the heart is unremarkable. Nonperforated acute appendicitis. **This report has been created using voice recognition software. It may contain minor errors which are inherent in voice recognition technology. ** Final report electronically signed by Dr. Ashok Branham on 5/31/2020 3:17 PM       Physical Exam:  Vitals: /76   Pulse 75   Temp 98.5 °F (36.9 °C) (Oral)   Resp 16   Ht 5' 9\" (1.753 m)   Wt 242 lb (109.8 kg)   SpO2 97%   BMI 35.74 kg/m²   24 hour intake/output:    Intake/Output Summary (Last 24 hours) at 6/1/2020 0859  Last data filed at 6/1/2020 0435  Gross per 24 hour   Intake 2776.36 ml   Output 125 ml   Net 2651.36 ml     Last 3 weights:   Wt Readings from Last 3 Encounters:   05/31/20 242 lb (109.8 kg)   04/06/20 247 lb (112 kg)   03/11/20 244 lb 8 oz (110.9 kg)       General appearance - oriented to person, place, and time and overweight  HEENT: Normocephalic and Atraumatic  Chest - clear to auscultation, no wheezes, rales or rhonchi, symmetric air entry  Cardiovascular - normal rate and regular rhythm  Abdomen - tenderness noted as expected,BS active,soft  Neurological - Alert and oriented and Normal speech  Integumentary - Skin color, texture, turgor normal. No Rashes or lesions  Musculoskeletal -Full ROM times 4 extremities  Surgical Incision: wel lapproximated, staples intact,  No drainage no erythema     DVT prophylaxis: [] Lovenox                                 [x] SCDs                                 [] SQ Heparin [x] Encourage ambulation           [] Already on Anticoagulation                 ASSESSMENT:  S/p attempted laparoscopic appendectomy,converted to open   POD #1  1. Acute postoperative pain   2. Mild leukocytosis   3. Chronic diastolic CHF  4. Surgical pathology pending        has a past medical history of Atrial fibrillation [facility]), Cerebral artery occlusion with cerebral infarction ([facility] [address] CHF (NYHA class III, ACC/AHA stage C) ([facility] [address] Episodic headache, Hyperlipidemia, and Hypertension. PLAN:  1. Routine incisional care daily  2. Labs in am   3. Stool softeners   4. Diet full liquid,will advance if tolerates   5. Iv hydration  6. DVT and GI Prophylaxis  7. Activity - ambulate, OOB to chair, C&DB,  IS  8. Analgesia and antiemetics as needed  9. Antibiotic Therapy    10. Discharge planning 24-48 hours if doing well       Electronically signed by ISABEL Sandoval CNP on 6/1/2020 at 8:59 AM Patient seen and examined independently by me. Above discussed and I agree with CNP. Labs, cultures, and radiographs where available were reviewed. See orders for the updated patient care plan.     Louise Gillespie MD, discussed the OR findings with patient and need to open patient states pain now is better than preop blood cell count 11.5 monitor GAUTAM  6/1/2020   9:07 PM

## 2020-06-02 ENCOUNTER — TELEPHONE (OUTPATIENT)
Dept: ADMINISTRATIVE | Age: 50
End: 2020-06-02

## 2020-06-02 VITALS
SYSTOLIC BLOOD PRESSURE: 124 MMHG | HEIGHT: 69 IN | DIASTOLIC BLOOD PRESSURE: 82 MMHG | TEMPERATURE: 98.1 F | BODY MASS INDEX: 35.84 KG/M2 | WEIGHT: 242 LBS | OXYGEN SATURATION: 95 % | RESPIRATION RATE: 14 BRPM | HEART RATE: 64 BPM

## 2020-06-02 PROBLEM — K37 APPENDICITIS: Status: RESOLVED | Noted: 2020-05-31 | Resolved: 2020-06-02

## 2020-06-02 PROBLEM — Z90.49 S/P APPENDECTOMY: Status: ACTIVE | Noted: 2020-06-02

## 2020-06-02 LAB
ERYTHROCYTE [DISTWIDTH] IN BLOOD BY AUTOMATED COUNT: 13.3 % (ref 11.5–14.5)
ERYTHROCYTE [DISTWIDTH] IN BLOOD BY AUTOMATED COUNT: 47.1 FL (ref 35–45)
HCT VFR BLD CALC: 39.9 % (ref 42–52)
HEMOGLOBIN: 12.8 GM/DL (ref 14–18)
MCH RBC QN AUTO: 30.8 PG (ref 26–33)
MCHC RBC AUTO-ENTMCNC: 32.1 GM/DL (ref 32.2–35.5)
MCV RBC AUTO: 95.9 FL (ref 80–94)
PLATELET # BLD: 211 THOU/MM3 (ref 130–400)
PMV BLD AUTO: 10.1 FL (ref 9.4–12.4)
RBC # BLD: 4.16 MILL/MM3 (ref 4.7–6.1)
WBC # BLD: 9.6 THOU/MM3 (ref 4.8–10.8)

## 2020-06-02 PROCEDURE — G0378 HOSPITAL OBSERVATION PER HR: HCPCS

## 2020-06-02 PROCEDURE — APPSS30 APP SPLIT SHARED TIME 16-30 MINUTES: Performed by: NURSE PRACTITIONER

## 2020-06-02 PROCEDURE — 2580000003 HC RX 258: Performed by: SURGERY

## 2020-06-02 PROCEDURE — 6370000000 HC RX 637 (ALT 250 FOR IP): Performed by: NURSE PRACTITIONER

## 2020-06-02 PROCEDURE — 6370000000 HC RX 637 (ALT 250 FOR IP): Performed by: SURGERY

## 2020-06-02 PROCEDURE — 99024 POSTOP FOLLOW-UP VISIT: CPT | Performed by: SURGERY

## 2020-06-02 PROCEDURE — 94760 N-INVAS EAR/PLS OXIMETRY 1: CPT

## 2020-06-02 PROCEDURE — 85027 COMPLETE CBC AUTOMATED: CPT

## 2020-06-02 PROCEDURE — 6360000002 HC RX W HCPCS: Performed by: SURGERY

## 2020-06-02 PROCEDURE — 36415 COLL VENOUS BLD VENIPUNCTURE: CPT

## 2020-06-02 RX ORDER — OXYCODONE HYDROCHLORIDE AND ACETAMINOPHEN 5; 325 MG/1; MG/1
1 TABLET ORAL EVERY 6 HOURS PRN
Qty: 16 TABLET | Refills: 0 | Status: SHIPPED | OUTPATIENT
Start: 2020-06-02 | End: 2020-06-10 | Stop reason: SDUPTHER

## 2020-06-02 RX ADMIN — DILTIAZEM HYDROCHLORIDE 60 MG: 30 TABLET, FILM COATED ORAL at 08:11

## 2020-06-02 RX ADMIN — METOPROLOL TARTRATE 50 MG: 50 TABLET, FILM COATED ORAL at 08:11

## 2020-06-02 RX ADMIN — POTASSIUM CHLORIDE 20 MEQ: 1500 TABLET, EXTENDED RELEASE ORAL at 08:11

## 2020-06-02 RX ADMIN — OXYCODONE HYDROCHLORIDE AND ACETAMINOPHEN 1 TABLET: 5; 325 TABLET ORAL at 12:31

## 2020-06-02 RX ADMIN — CEFOXITIN SODIUM 1 G: 1 POWDER, FOR SOLUTION INTRAVENOUS at 04:15

## 2020-06-02 RX ADMIN — SODIUM CHLORIDE: 9 INJECTION, SOLUTION INTRAVENOUS at 05:54

## 2020-06-02 RX ADMIN — POLYETHYLENE GLYCOL 3350 17 G: 17 POWDER, FOR SOLUTION ORAL at 04:59

## 2020-06-02 RX ADMIN — OXYCODONE HYDROCHLORIDE AND ACETAMINOPHEN 1 TABLET: 5; 325 TABLET ORAL at 08:10

## 2020-06-02 RX ADMIN — DOCUSATE SODIUM 100 MG: 100 CAPSULE, LIQUID FILLED ORAL at 08:12

## 2020-06-02 RX ADMIN — LOSARTAN POTASSIUM 50 MG: 50 TABLET, FILM COATED ORAL at 08:10

## 2020-06-02 ASSESSMENT — PAIN DESCRIPTION - ORIENTATION: ORIENTATION: RIGHT;LOWER

## 2020-06-02 ASSESSMENT — PAIN SCALES - GENERAL
PAINLEVEL_OUTOF10: 0
PAINLEVEL_OUTOF10: 5
PAINLEVEL_OUTOF10: 5
PAINLEVEL_OUTOF10: 6

## 2020-06-02 ASSESSMENT — PAIN DESCRIPTION - PROGRESSION
CLINICAL_PROGRESSION: GRADUALLY IMPROVING

## 2020-06-02 ASSESSMENT — PAIN DESCRIPTION - LOCATION: LOCATION: ABDOMEN

## 2020-06-02 ASSESSMENT — PAIN DESCRIPTION - FREQUENCY: FREQUENCY: INTERMITTENT

## 2020-06-02 ASSESSMENT — PAIN DESCRIPTION - PAIN TYPE: TYPE: ACUTE PAIN

## 2020-06-02 ASSESSMENT — PAIN DESCRIPTION - DESCRIPTORS: DESCRIPTORS: SHARP;OTHER (COMMENT)

## 2020-06-02 NOTE — DISCHARGE SUMMARY
Discharge Summary     Patient Identification:  Richie Farrell  : 1970  MRN: [id number]   Account: [de-identified]     Admit date: 2020  Discharge date: 2020  Attending provider: Richard Alejandro MD        Primary care provider: Damian Viveros DO     Discharge Diagnoses: Active Problems:    S/P appendectomy  Resolved Problems:    Appendicitis       Hospital Course:   Richie Farrell is a 54 y.o. male admitted to [facility] on 2020 for acute appendicitis. he was taken to the operative suite per Bran Jain MD and the planned procedure was performed as noted above. He was admitted to [address] [place] [address] for postoperative care and was initially managed with IV antibiotics, analgesics for pain control, IV fluid hydration, GI and DVT prophylaxis. Over the hospital stay he readily improved in ability to tolerate increasing levels of activity and to take po fluids, solid foods with evidence of returning bowel function, and spontaneously voiding. At the time of discharge he was able to tolerate pain with oral analgesic and was medically stable. Procedures:   DATE OF PROCEDURE:  2020     PREOPERATIVE DIAGNOSIS:  Appendicitis.     POSTOPERATIVE DIAGNOSIS:  Appendicitis.     OPERATIONS:  1. Attempted laparoscopic appendectomy. 2.  Conversion to open appendectomy.     SURGEON:  Joselito Phelps. MD Umer     ANESTHESIA:  General.     COMPLICATIONS:  Inability to complete laparoscopically.     ESTIMATED BLOOD LOSS:  30 mL.     INDICATION:  The patient is a 68-year-old male with acute appendicitis.     FINDINGS:  The patient had an appendix that was completely plastered  with inflammation to the cecal wall. It was unable to be dissected out  laparoscopically, had to be converted to open.     DESCRIPTION OF PROCEDURE:  The patient was brought to the operating  suite, placed supine on the operating room table.   After adequate  inhalational anesthesia was administered, the patient's abdomen through the abdominal cavity. I  actually was easily able to deliver the cecum into the wound and it was  at this point in time, with blunt dissection, I was able to take this  obviously quite inflamed appendix off the cecal wall. It actually had  almost embedded into the wall and formed a cavity with the small bowel. We then fired an Endo DENITA across the base of the appendix,  it  and then with further blunt dissection, removed the appendix. I had to  put a 3-0 Vicryl suture ligature in as there was a bleeding vessel. We  then irrigated with Irrisept. I then placed Surgiflo into this cavity  where the appendix had been, dropped the cecum back into the abdominal  cavity and closure was begun. 0 Vicryl was used to close the  peritoneum, #1 Vicryl was used to close the fascia. Interrupted 3-0  Vicryl was used to close the subcutaneous before which we continued  irrigating with some Irrisept and then staples were used to close the  skin and also staples were used to close the port sites.   The patient  tolerated the procedure well.       Code Status: Full Code     Consults:   none    Examination:  Vitals:  Vitals:    06/01/20 2301 06/02/20 0415 06/02/20 0741 06/02/20 0907   BP: 132/80 122/82 124/82    Pulse: 60 64 64    Resp: 20 18 14    Temp: 98 °F (36.7 °C) 97.9 °F (36.6 °C) 98.1 °F (36.7 °C)    TempSrc: Oral Oral Oral    SpO2: 96% 98% 96% 95%   Weight:       Height:         Weight: Weight: 242 lb (109.8 kg)     24 hour intake/output:    Intake/Output Summary (Last 24 hours) at 6/2/2020 1008  Last data filed at 6/2/2020 0503  Gross per 24 hour   Intake 2569 ml   Output 710 ml   Net 1859 ml       General appearance - oriented to person, place, and time and overweight  Chest - clear to auscultation, no wheezes, rales or rhonchi, symmetric air entry  Heart - normal rate and regular rhythm  Abdomen - tenderness noted as expected, BS active, softly distended  Neurological - alert, oriented, normal speech, no focal findings or movement disorder noted  Extremities - peripheral pulses normal, no pedal edema, no clubbing or cyanosis  Skin - normal coloration and turgor, no rashes, no suspicious skin lesions noted  Surgical Incisions: well approximated, staples intact, no drainage no erythema     Significant Diagnostics:   Radiology: Xr Abdomen (kub) (single Ap View)    Result Date: 5/31/2020  PROCEDURE: XR ABDOMEN (KUB) (SINGLE AP VIEW) CLINICAL INFORMATION: 82-year-old male with abdominal pain. Constipation. COMPARISON: No prior study. TECHNIQUE: 2 AP views of the abdomen were obtained in the supine position. FINDINGS: There are no distended bowel loops. There are no displaced bowel loops. There is no gross free air. No suspicious densities are present. No suspicious osseous lesions are present. Nonobstructive bowel gas pattern. **This report has been created using voice recognition software. It may contain minor errors which are inherent in voice recognition technology. ** Final report electronically signed by Dr Keisha Morales on 5/31/2020 12:55 PM    Ct Abdomen Pelvis W Iv Contrast Additional Contrast? None    Result Date: 5/31/2020  PROCEDURE: CT ABDOMEN PELVIS W IV CONTRAST CLINICAL INFORMATION: RLQ pain, appendicitis . COMPARISON: 2/22/2010 TECHNIQUE: 2-D multiplanar postcontrast images of the abdomen and pelvis. Isovue-370. All CT scans at this facility use dose modulation, iterative reconstruction, and/or weight-based dosing when appropriate to reduce radiation dose to as low as reasonably achievable. FINDINGS: Abdomen and pelvis: The appendix is dilated and shows abnormal wall enhancement and significant periappendiceal inflammatory edema. Findings are compatible with acute appendicitis. No free air is seen to suggest perforation. No abscess formation is seen. Liver, and spleen are normal. Pancreas is normal. Gallbladder is normal. Adrenals and kidneys are normal. Aorta is unremarkable.  No adenopathy INSTRUCTIONS    Pt Name: Makayla Cantrell Record Number: [id number]  Today's Date: 6/2/2020      ACTIVITY INSTRUCTIONS:  Ambulate 4 times a day for approximately 10-15  minutes each time     You may resume normal activity tomorrow. Do not engage in strenuous activity that may place stress on your incision. You may drive when no longer taking pain medication and you are able to comfortably use the gas/brake pedal. Do not drive long distance, in town driving recommended    avoid heavy lifting, tugging, pullings greater than 10-15 lbs for 6 weeks postoperatively, or until released by Physician/CNP      DIET INSTRUCTIONS:  Normal at home diet    MEDICATIONS  You may resume your daily prescription medication schedule unless otherwise specified. Pain medication at discharge - use only as prescribed- refills may be available to you at your follow up appointments if needed and warranted. Narcotics should be used for only short term and we highly encouraged our patients to wean off appropriately and use other means for pain such as non pharmacologic measures and over the counter tylenol or ibuprofen if no restrictions apply. We do  know that surgical pain is real and will not hesitate to help eliminate some of your discomfort. However we will not be able to completely make you pain free and it is important to determine what pain level is tolerable for you     Narcotics cause constipation and we recommend taking a colace daily and Miralax if needed to help reduce the risk of constipation    Increasing water intake if no restrictions will also help eliminate constipation    Ambulation 4 times a day 15 minute each time will help reduce pain each day and help relieve constipation      WOUND/DRESSING INSTRUCTIONS:  Always ensure you and your care giver clean hands before and after caring for the  wound. Keep dressing clean and dry, Change when soiled or wet.     Leave incision open to air if not draining   Allow

## 2020-06-04 ENCOUNTER — TELEPHONE (OUTPATIENT)
Dept: FAMILY MEDICINE CLINIC | Age: 50
End: 2020-06-04

## 2020-06-09 ENCOUNTER — OFFICE VISIT (OUTPATIENT)
Dept: SURGERY | Age: 50
End: 2020-06-09

## 2020-06-09 VITALS
BODY MASS INDEX: 35.68 KG/M2 | TEMPERATURE: 97.3 F | OXYGEN SATURATION: 98 % | DIASTOLIC BLOOD PRESSURE: 80 MMHG | WEIGHT: 240.9 LBS | HEART RATE: 72 BPM | HEIGHT: 69 IN | SYSTOLIC BLOOD PRESSURE: 122 MMHG | RESPIRATION RATE: 18 BRPM

## 2020-06-09 PROCEDURE — 99024 POSTOP FOLLOW-UP VISIT: CPT | Performed by: SURGERY

## 2020-06-09 NOTE — PROGRESS NOTES
[address] [facility] [address] [facility] [address]  Dept: [phone]  Dept Fax: [phone]  Loc: [phone]    Visit Date: 2020    Alondra Fernández is a 54 y.o. male who presentstoday for:  Chief Complaint   Patient presents with    Post-Op Check     s/p- Attempted laparoscopic appendectomy, Conversion to open appendectomy        HPI:     HPI status post attempted laparoscopic appendectomy with conversion to open patient is doing well we also had removed a very small mole on the abdominal wall that was dark pathology shows compound nevus pathology on appendix was acute appendicitis    Past Medical History:   Diagnosis Date    [facility]rial fibrillation ([facility]     Cerebral artery occlusion with cerebral infarction ([facility]     CHF (NYHA class III, ACC/AHA stage C) ([facility] 2016    Episodic headache 2016    Hyperlipidemia     Hypertension       Past Surgical History:   Procedure Laterality Date    CARDIOVERSION      LAPAROSCOPIC APPENDECTOMY N/A 2020    APPENDECTOMY LAPAROSCOPIC converted to open performed by Mar Owens MD at [address] [place] [address]          Family History   Problem Relation Age of Onset    Heart Disease Father     Cancer Maternal Aunt     Heart Failure Maternal Grandmother     Diabetes Paternal Aunt         Social History     Tobacco Use    Smoking status: Former Smoker     Packs/day: 0.50     Years: 4.00     Pack years: 2.00     Last attempt to quit: 2000     Years since quittin.4    Smokeless tobacco: Never Used   Substance Use Topics    Alcohol use: Yes     Comment: SOCIALLY          Current Outpatient Medications   Medication Sig Dispense Refill    oxyCODONE-acetaminophen (PERCOCET) 5-325 MG per tablet Take 1 tablet by mouth every 6 hours as needed for Pain for up to 7 days.  16 tablet 0    acetaminophen (TYLENOL) 500 MG tablet Take 500 mg by mouth every 6 33.0 pg    MCHC 32.1 (L) 32.2 - 35.5 gm/dl    RDW-CV 13.3 11.5 - 14.5 %    RDW-SD 47.1 (H) 35.0 - 45.0 fL    Platelets 364 037 - 182 thou/mm3    MPV 10.1 9.4 - 12.4 fL   EKG 12 Lead   Result Value Ref Range    Ventricular Rate 80 BPM    Atrial Rate 80 BPM    P-R Interval 154 ms    QRS Duration 82 ms    Q-T Interval 372 ms    QTc Calculation (Bazett) 429 ms    P Axis 60 degrees    R Axis -12 degrees    T Axis 13 degrees       Assessment:     Status post conversion to open appendectomy for acute appendicitis discussed pathology with the patient including the mole and the appendix patient works as a  okay to return to work on June 22    Plan:     Return to office as needed      Electronicallysigned by Douglas Hope MD on 6/9/2020 at 11:40 AM

## 2020-06-09 NOTE — LETTER
[address] [facility]  [place] [address] [address] [facility] [address]  Phone: [phone]  Fax: [phone]    Kaylin Kent MD        June 9, 2020     Patient: Zoraida Cooper   YOB: 1970   Date of Visit: 6/9/2020       To Whom It May Concern: It is my medical opinion that [facility] may return to work on 6/22/2020 without restrictions. If you have any questions or concerns, please don't hesitate to call.     Sincerely,        Kaylin Kent MD

## 2020-06-10 ENCOUNTER — OFFICE VISIT (OUTPATIENT)
Dept: FAMILY MEDICINE CLINIC | Age: 50
End: 2020-06-10
Payer: COMMERCIAL

## 2020-06-10 VITALS
DIASTOLIC BLOOD PRESSURE: 76 MMHG | RESPIRATION RATE: 16 BRPM | HEART RATE: 80 BPM | BODY MASS INDEX: 35.34 KG/M2 | SYSTOLIC BLOOD PRESSURE: 110 MMHG | TEMPERATURE: 97.9 F | WEIGHT: 239.3 LBS

## 2020-06-10 PROCEDURE — 99495 TRANSJ CARE MGMT MOD F2F 14D: CPT | Performed by: FAMILY MEDICINE

## 2020-06-10 RX ORDER — METOPROLOL TARTRATE 50 MG/1
50 TABLET, FILM COATED ORAL 2 TIMES DAILY
COMMUNITY
Start: 2016-11-22 | End: 2022-05-24

## 2020-06-10 RX ORDER — DOCUSATE SODIUM 100 MG/1
100 CAPSULE, LIQUID FILLED ORAL DAILY
Status: ON HOLD | COMMUNITY
Start: 2020-06-01 | End: 2021-11-24

## 2020-06-10 RX ORDER — OXYCODONE HYDROCHLORIDE AND ACETAMINOPHEN 5; 325 MG/1; MG/1
1 TABLET ORAL EVERY 8 HOURS PRN
Qty: 15 TABLET | Refills: 0 | Status: SHIPPED | OUTPATIENT
Start: 2020-06-10 | End: 2020-06-15

## 2020-06-10 RX ORDER — LOSARTAN POTASSIUM 25 MG/1
25 TABLET ORAL DAILY
COMMUNITY
Start: 2020-05-29 | End: 2022-01-17 | Stop reason: SDUPTHER

## 2020-06-10 NOTE — PROGRESS NOTES
[address] [facility]   YOB: 1970    Date of Visit:  6/10/2020  30 Day Post-Discharge Date: 7/2/20  Chief Complaint   Patient presents with    [facility] - D/C'd from [facility] on 6/2/20 - Appendectomy     Admit date: 5/31/2020  Discharge date: 6/2/2020  Attending provider: Nadeen Russ MD        Primary care provider: Liz Womack DO      Discharge Diagnoses: Active Problems:    S/P appendectomy  Resolved Problems:    Appendicitis        Hospital Course:   Jame Rodríguez is a 54 y.o. male admitted to [address] [place] [address] on 5/31/2020 for acute appendicitis. he was taken to the operative suite per Pamela Herbert MD and the planned procedure was performed as noted above. He was admitted to [address] [place] [address] for postoperative care and was initially managed with IV antibiotics, analgesics for pain control, IV fluid hydration, GI and DVT prophylaxis. Over the hospital stay he readily improved in ability to tolerate increasing levels of activity and to take po fluids, solid foods with evidence of returning bowel function, and spontaneously voiding. At the time of discharge he was able to tolerate pain with oral analgesic and was medically stable. Was seen by Dr. Louella Krabbe yesterday, staples removed. BP looks great.     BP Readings from Last 3 Encounters:   06/10/20 110/76   06/09/20 122/80   06/02/20 124/82       Allergies   Allergen Reactions    Albuterol      Per patient raises my blood pressure     Outpatient Medications Marked as Taking for the 6/10/20 encounter (Office Visit) with Liz Womack DO   Medication Sig Dispense Refill    losartan (COZAAR) 50 MG tablet Take 1 tablet by mouth daily      metoprolol tartrate (LOPRESSOR) 50 MG tablet Take 50 mg by mouth 2 times daily      oxyCODONE-acetaminophen (PERCOCET) 5-325 MG per tablet Take 1 tablet by mouth every 8 hours as needed for Pain for up to 5 erythema  Head: normocephalic and atraumatic  Eyes: pupils equal, round, and reactive to light, extraocular eye movements intact, conjunctivae normal  ENT: tympanic membrane, external ear and ear canal normal bilaterally, nose without deformity, nasal mucosa and turbinates normal without polyps  Neck: supple and non-tender without mass, no thyromegaly or thyroid nodules, no cervical lymphadenopathy  Pulmonary/Chest: clear to auscultation bilaterally- no wheezes, rales or rhonchi, normal air movement, no respiratory distress  Cardiovascular: normal rate, regular rhythm, normal S1 and S2, no murmurs, rubs, clicks, or gallops, distal pulses intact, no carotid bruits  Abdomen: soft, non-tender, non-distended, normal bowel sounds, no masses or organomegaly. Abdominal wounds healing nicely  Extremities: no cyanosis, clubbing or edema      Initial post-discharge communication occurred between medical assistant and patient on 6/4/20- see documentation in chart: telephone encounter. Assessment/Plan:  Reji Tovar was seen today for follow-up from hospital.    Diagnoses and all orders for this visit:    Acute appendicitis, unspecified acute appendicitis type    S/P appendectomy    Acute postoperative abdominal pain  -     oxyCODONE-acetaminophen (PERCOCET) 5-325 MG per tablet; Take 1 tablet by mouth every 8 hours as needed for Pain for up to 5 days.           Diagnostic test results reviewed: inpatient labs, EKG, x-ray-abd and CT-abd/pelvis    Patient risk of morbidity and mortality: moderate    Medical Decision Making: moderate complexity     -  Pt healing well  -  Short-term refill Percocet given  -  RTO prn

## 2020-07-09 ENCOUNTER — HOSPITAL ENCOUNTER (OUTPATIENT)
Dept: SLEEP CENTER | Age: 50
Discharge: HOME | End: 2020-07-11
Payer: COMMERCIAL

## 2020-07-09 PROCEDURE — 95806 SLEEP STUDY UNATT&RESP EFFT: CPT

## 2020-07-09 NOTE — PROGRESS NOTES
Zulma Cedeno presents today for a HST instruction and demonstration on unit # 230. Questions were asked and answers given. He was able to return demonstration and verbalized understanding. The sleep center control room phone number was provided incase questions arise during her study. Informed patient to call 911 in case of an emergency. He states he will return the unit tomorrow.

## 2020-07-13 LAB — STATUS: NORMAL

## 2020-07-28 ENCOUNTER — OFFICE VISIT (OUTPATIENT)
Dept: PULMONOLOGY | Age: 50
End: 2020-07-28
Payer: COMMERCIAL

## 2020-07-28 VITALS
HEIGHT: 69 IN | TEMPERATURE: 97.6 F | DIASTOLIC BLOOD PRESSURE: 68 MMHG | BODY MASS INDEX: 35.73 KG/M2 | WEIGHT: 241.2 LBS | OXYGEN SATURATION: 99 % | SYSTOLIC BLOOD PRESSURE: 120 MMHG | RESPIRATION RATE: 14 BRPM | HEART RATE: 64 BPM

## 2020-07-28 PROCEDURE — 99213 OFFICE O/P EST LOW 20 MIN: CPT | Performed by: INTERNAL MEDICINE

## 2020-07-28 NOTE — PROGRESS NOTES
Sleep Medicine    Susana Aguero, 54 y.o.  [id number]    Nurses Notes   Jeni Suraez is here to review sleep study results    Study Results    Initial Study Date -  20  AHI -  2.2    Total Events - 17  (Apneas  3  Hypopneas 14  Central  0)  LM w/Arousals -   Sleep Efficiency -  % (Total Sleep Time -  min)  Time with Sats below 88% - 0 min  Oxygen level - Room Air  97.2      INTERVAL HISTORY         Susana Aguero is a 54 y.o. old male who comes in to review the results of his recent sleep study, to answer questions and to explore options for treatment. HST is negative for BELINDA with a JASON 2.2  Good sats and some extrasystoles (PVCs) but no sustained arrhthymias.      PMH  Past Medical History:   Diagnosis Date    Atrial fibrillation ([address]     Cerebral artery occlusion with cerebral infarction ([address]     CHF (NYHA class III, ACC/AHA stage C) ([address] 2016    Episodic headache 2016    Hyperlipidemia     Hypertension      Past Surgical History:   Procedure Laterality Date    CARDIOVERSION      LAPAROSCOPIC APPENDECTOMY N/A 2020    APPENDECTOMY LAPAROSCOPIC converted to open performed by Christina Neves MD at [address] [facility]       Social History     Tobacco Use    Smoking status: Former Smoker     Packs/day: 0.50     Years: 4.00     Pack years: 2.00     Last attempt to quit: 2000     Years since quittin.5    Smokeless tobacco: Never Used   Substance Use Topics    Alcohol use: Yes     Comment: SOCIALLY    Drug use: Not Currently     Types: Marijuana     Comment: hx of      Family History   Problem Relation Age of Onset    Heart Disease Father     Cancer Maternal Aunt     Heart Failure Maternal Grandmother     Diabetes Paternal Aunt        ALLERGIES  Allergies   Allergen Reactions    Albuterol      Per patient raises my blood pressure       MEDS  Current Outpatient Medications   Medication Sig Dispense Refill    losartan (COZAAR) 50 MG tablet Take 1 tablet Monitoring Sleep Study  including the simultaneous recording of oral-nasal airflow, rib and  abdominal respiratory effort, pulse rate, oxygen saturation, left and  right leg movement, and body position. Scoring criteria is consistent  with the current published AASM standards for scoring of apneas and  hypopneas 4A.     DETAILS OF THE STUDY:  The patient came by the sleep lab and picked up  his HST unit. He was given instructions. He returned the unit the next  day. The information was successfully downloaded and scored. Total  recording time 468 minutes. Lights off time 10:03 a.m. Lights on time  05:51 pm.     RESPIRATORY SUMMARY:  3 obstructive apneas, 14 obstructive hypopneas for  an JASON of 2.2.     PULSE OXIMETRY SUMMARY:  Mean oxygen saturation 97.2%, lowest oxygen  saturation 90%.     BODY POSITION SUMMARY:  206 minutes of supine time, 262 minutes of  non-supine time, 6.6 minutes of upright time.     ECG SUMMARY:  The patient remained in normal sinus rhythm with PVCs.     IMPRESSION:  Negative polysomnogram for sleep disordered breathing, JASON  2.2. PVCs recorded; however, no sustained arrhythmias.     RECOMMENDATIONS:  There are no indications for PAP therapy. The patient  will be followed up in the sleep clinic to review results and plan of  care.  Tyrell Osborn M.D. EXAM  Vitals -  /68 (Site: Left Upper Arm, Position: Sitting, Cuff Size: Medium Adult)   Pulse 64   Temp 97.6 °F (36.4 °C) Comment: Temporal  Resp 14   Ht 5' 9\" (1.753 m)   Wt 241 lb 3.2 oz (109.4 kg)   SpO2 99%   BMI 35.62 kg/m²    Body mass index is 35.62 kg/m². Dentition - good  Constitutional - No distress. Patient is oriented to person, place, and time. Mouth/Throat - Oropharynx is clear and moist.   Neck - Neck supple. No JVD present. No tracheal deviation present. Psychiatric - Patient  has a normal mood and affect.     ASSESSMENT    Negative  HST for BELINDA  PVCs but no AF  Obesity BMI

## 2020-08-11 ENCOUNTER — HOSPITAL ENCOUNTER (OUTPATIENT)
Age: 50
Discharge: HOME | End: 2020-08-11
Payer: COMMERCIAL

## 2020-08-11 LAB
ANION GAP SERPL CALCULATED.3IONS-SCNC: 12 MEQ/L (ref 8–16)
BUN BLDV-MCNC: 11 MG/DL (ref 7–22)
CALCIUM SERPL-MCNC: 9.1 MG/DL (ref 8.5–10.5)
CHLORIDE BLD-SCNC: 103 MEQ/L (ref 98–111)
CO2: 25 MEQ/L (ref 23–33)
CREAT SERPL-MCNC: 1 MG/DL (ref 0.4–1.2)
ERYTHROCYTE [DISTWIDTH] IN BLOOD BY AUTOMATED COUNT: 13 % (ref 11.5–14.5)
ERYTHROCYTE [DISTWIDTH] IN BLOOD BY AUTOMATED COUNT: 44.5 FL (ref 35–45)
GFR SERPL CREATININE-BSD FRML MDRD: > 90 ML/MIN/1.73M2
GLUCOSE BLD-MCNC: 98 MG/DL (ref 70–108)
HCT VFR BLD CALC: 46.3 % (ref 42–52)
HEMOGLOBIN: 15.2 GM/DL (ref 14–18)
MCH RBC QN AUTO: 30.6 PG (ref 26–33)
MCHC RBC AUTO-ENTMCNC: 32.8 GM/DL (ref 32.2–35.5)
MCV RBC AUTO: 93.2 FL (ref 80–94)
PLATELET # BLD: 238 THOU/MM3 (ref 130–400)
PMV BLD AUTO: 10.2 FL (ref 9.4–12.4)
POTASSIUM SERPL-SCNC: 4.2 MEQ/L (ref 3.5–5.2)
RBC # BLD: 4.97 MILL/MM3 (ref 4.7–6.1)
SODIUM BLD-SCNC: 140 MEQ/L (ref 135–145)
WBC # BLD: 6.4 THOU/MM3 (ref 4.8–10.8)

## 2020-08-11 PROCEDURE — 36415 COLL VENOUS BLD VENIPUNCTURE: CPT

## 2020-08-11 PROCEDURE — 85027 COMPLETE CBC AUTOMATED: CPT

## 2020-08-11 PROCEDURE — 80048 BASIC METABOLIC PNL TOTAL CA: CPT

## 2020-12-08 ENCOUNTER — OFFICE VISIT (OUTPATIENT)
Dept: FAMILY MEDICINE CLINIC | Age: 50
End: 2020-12-08
Payer: COMMERCIAL

## 2020-12-08 VITALS
RESPIRATION RATE: 20 BRPM | DIASTOLIC BLOOD PRESSURE: 82 MMHG | HEART RATE: 80 BPM | TEMPERATURE: 96.8 F | BODY MASS INDEX: 37.89 KG/M2 | WEIGHT: 256.6 LBS | SYSTOLIC BLOOD PRESSURE: 112 MMHG

## 2020-12-08 PROCEDURE — 99214 OFFICE O/P EST MOD 30 MIN: CPT | Performed by: FAMILY MEDICINE

## 2020-12-08 ASSESSMENT — ENCOUNTER SYMPTOMS
RESPIRATORY NEGATIVE: 1
PHOTOPHOBIA: 0
GASTROINTESTINAL NEGATIVE: 1

## 2020-12-08 NOTE — PROGRESS NOTES
Subjective:      Patient ID: Michelle Mendez is a 52 y.o. male. HPI:    Chief Complaint   Patient presents with    Headache     daily for the past couple weeks     Pt here for daily HA's for the last few weeks. HA's are bitemporal in nature. Pt will occasionally wake up with the HA but they do not wake at night. HA's are sporadic, will last about an hour. BP ok. BP Readings from Last 3 Encounters:   12/08/20 112/82   07/28/20 120/68   06/10/20 110/76     Not related to meds. Denies NV. Denies photophobia/phonophobia. No visual changes. No balance disturbance. Pt has had some congestion, has not tried OTC's. No recent head trauma. Pt drinks 1 pop daily. Non-smoker. He does snore on rare occasions. Has been tested for BELINDA, negative. Patient Active Problem List   Diagnosis    Persistent atrial fibrillation (HCC)    Episodic headache    Cerebrovascular accident (CVA) due to embolism of left middle cerebral artery ([address]    CHF (NYHA class III, ACC/AHA stage C) ([address]    MR (mitral regurgitation)    Atrial fibrillation (HCC)    Abdominal pain, right lower quadrant    S/P appendectomy     Past Surgical History:   Procedure Laterality Date    CARDIOVERSION  2019    LAPAROSCOPIC APPENDECTOMY N/A 5/31/2020    APPENDECTOMY LAPAROSCOPIC converted to open performed by Betzy Castañeda MD at [address] [place] [address]       Prior to Admission medications    Medication Sig Start Date End Date Taking?  Authorizing Provider   losartan (COZAAR) 50 MG tablet Take 1 tablet by mouth daily 5/29/20  Yes Historical Provider, MD   metoprolol tartrate (LOPRESSOR) 50 MG tablet Take 50 mg by mouth 2 times daily 11/22/16  Yes Historical Provider, MD   docusate sodium (COLACE) 100 MG capsule Take 100 mg by mouth daily 6/1/20  Yes Historical Provider, MD   acetaminophen (TYLENOL) 500 MG tablet Take 500 mg by mouth every 6 hours as needed for Pain   Yes Historical Provider, MD   apixaban (ELIQUIS) 5 MG TABS tablet Take 1 tablet by mouth 2 times daily 2/7/20  Yes Avtar Trujillo APRN - CNP   diltiazem (CARDIZEM) 60 MG tablet Take 60 mg by mouth 2 times daily   Yes Historical Provider, MD   atorvastatin (LIPITOR) 40 MG tablet Take 40 mg by mouth daily   Yes Historical Provider, MD   hydrOXYzine (VISTARIL) 25 MG capsule Take 1 capsule by mouth 3 times daily as needed for Anxiety 4/17/19  Yes Kim Luevano DO   aspirin 81 MG chewable tablet Take 1 tablet by mouth daily 11/22/16  Yes Esme Dominguez MD   nitroGLYCERIN (NITROSTAT) 0.4 MG SL tablet Dissolve 1 tablet under tongue for chest pain and repeat every 5 min up to max of 3 total doses. If no relief after 3 doses call 911 11/22/16  Yes Esme Dominguez MD   bumetanide (BUMEX) 2 MG tablet Take 1 tablet by mouth daily  Patient taking differently: Take 2 mg by mouth daily as needed  11/22/16  Yes Esme Dominguez MD   potassium chloride (KLOR-CON M) 20 MEQ extended release tablet Take 1 tablet by mouth daily 11/22/16  Yes Esme Dominguez MD         Review of Systems   Constitutional: Negative. HENT: Positive for congestion. Eyes: Negative for photophobia and visual disturbance. Respiratory: Negative. Cardiovascular: Negative. Gastrointestinal: Negative. Musculoskeletal: Negative. Negative for gait problem. Neurological: Positive for headaches. Negative for dizziness and light-headedness. All other systems reviewed and are negative. Objective:   Physical Exam  Vitals signs and nursing note reviewed. Constitutional:       General: He is not in acute distress. Appearance: Normal appearance. He is well-developed. HENT:      Head: Normocephalic and atraumatic. Right Ear: Tympanic membrane normal.      Left Ear: Tympanic membrane normal.   Eyes:      Extraocular Movements: Extraocular movements intact. Conjunctiva/sclera: Conjunctivae normal.   Neck:      Musculoskeletal: Neck supple. Cardiovascular:      Rate and Rhythm: Normal rate and regular rhythm. Heart sounds: Normal heart sounds. No murmur. Pulmonary:      Effort: Pulmonary effort is normal.      Breath sounds: Normal breath sounds. No wheezing, rhonchi or rales. Abdominal:      General: There is no distension. Skin:     General: Skin is warm and dry. Findings: No rash (on exposed surfaces). Neurological:      General: No focal deficit present. Mental Status: He is alert. Cranial Nerves: Cranial nerves are intact. Motor: Motor function is intact. Coordination: Coordination is intact. Psychiatric:         Attention and Perception: Attention normal.         Mood and Affect: Mood normal.         Speech: Speech normal.         Behavior: Behavior normal. Behavior is cooperative. Thought Content: Thought content normal.         Judgment: Judgment normal.         Assessment:       Diagnosis Orders   1. Chronic daily headache     2. Paroxysmal atrial fibrillation (HCC)     3. Nonrheumatic mitral valve regurgitation     4.  History of CVA (cerebrovascular accident)           Plan:      -  Uncertain etiology at this time, no red flags  -  Question analgesic rebound, takes daily Tylenol  -  Also possible that it's related to diltiazem and/or atorvastatin (low suspicion)  -  Will hold all OTC analgesics for now  -  Limit caffeine and increase water intake  -  Call office 2 weeks with update, consider imaging at that time if no improvement due to increased frequency w/ hx of CVA        Christian Shrestha, DO

## 2020-12-08 NOTE — PROGRESS NOTES
Visit Information    Have you changed or started any medications since your last visit including any over-the-counter medicines, vitamins, or herbal medicines? no   Are you having any side effects from any of your medications? -  no  Have you stopped taking any of your medications? Is so, why? -  yes - tx complete    Have you seen any other physician or provider since your last visit? No  Have you had any other diagnostic tests since your last visit? No  Have you been seen in the emergency room and/or had an admission to a hospital since we last saw you? No  Have you had your routine dental cleaning in the past 6 months? no    Have you activated your [employer] account? If not, what are your barriers?  Yes     Patient Care Team:  Kim Luevano DO as PCP - General (Family Medicine)  Kim Luevano DO as PCP - [facility]  Esme Dominguez MD as Consulting Physician (Cardiology)    Medical History Review  Past Medical, Family, and Social History reviewed and does not contribute to the patient presenting condition    Health Maintenance   Topic Date Due    HIV screen  08/13/1987    DTaP/Tdap/Td vaccine (1 - Tdap) 08/13/1991    Lipid screen  07/25/2020    Flu vaccine (1) 09/01/2020    Potassium monitoring  08/11/2021    Creatinine monitoring  08/11/2021    Hepatitis A vaccine  Aged Out    Hepatitis B vaccine  Aged Out    Hib vaccine  Aged Out    Meningococcal (ACWY) vaccine  Aged Out    Pneumococcal 0-64 years Vaccine  Aged Out

## 2021-02-23 ENCOUNTER — HOSPITAL ENCOUNTER (OUTPATIENT)
Age: 51
Discharge: HOME | End: 2021-02-23
Payer: COMMERCIAL

## 2021-02-23 LAB
ANION GAP SERPL CALCULATED.3IONS-SCNC: 9 MEQ/L (ref 8–16)
BUN BLDV-MCNC: 10 MG/DL (ref 7–22)
CALCIUM SERPL-MCNC: 9.4 MG/DL (ref 8.5–10.5)
CHLORIDE BLD-SCNC: 103 MEQ/L (ref 98–111)
CO2: 25 MEQ/L (ref 23–33)
CREAT SERPL-MCNC: 1 MG/DL (ref 0.4–1.2)
ERYTHROCYTE [DISTWIDTH] IN BLOOD BY AUTOMATED COUNT: 12.7 % (ref 11.5–14.5)
ERYTHROCYTE [DISTWIDTH] IN BLOOD BY AUTOMATED COUNT: 42.4 FL (ref 35–45)
GFR SERPL CREATININE-BSD FRML MDRD: > 90 ML/MIN/1.73M2
GLUCOSE BLD-MCNC: 97 MG/DL (ref 70–108)
HCT VFR BLD CALC: 44.8 % (ref 42–52)
HEMOGLOBIN: 14.9 GM/DL (ref 14–18)
MCH RBC QN AUTO: 30.5 PG (ref 26–33)
MCHC RBC AUTO-ENTMCNC: 33.3 GM/DL (ref 32.2–35.5)
MCV RBC AUTO: 91.6 FL (ref 80–94)
PLATELET # BLD: 227 THOU/MM3 (ref 130–400)
PMV BLD AUTO: 10.1 FL (ref 9.4–12.4)
POTASSIUM SERPL-SCNC: 3.9 MEQ/L (ref 3.5–5.2)
RBC # BLD: 4.89 MILL/MM3 (ref 4.7–6.1)
SODIUM BLD-SCNC: 137 MEQ/L (ref 135–145)
WBC # BLD: 5.4 THOU/MM3 (ref 4.8–10.8)

## 2021-02-23 PROCEDURE — 85027 COMPLETE CBC AUTOMATED: CPT

## 2021-02-23 PROCEDURE — 80048 BASIC METABOLIC PNL TOTAL CA: CPT

## 2021-02-23 PROCEDURE — 36415 COLL VENOUS BLD VENIPUNCTURE: CPT

## 2021-08-30 ENCOUNTER — HOSPITAL ENCOUNTER (OUTPATIENT)
Age: 51
Discharge: HOME | End: 2021-08-30
Payer: COMMERCIAL

## 2021-08-30 LAB
ANION GAP SERPL CALCULATED.3IONS-SCNC: 6 MEQ/L (ref 8–16)
BUN BLDV-MCNC: 13 MG/DL (ref 7–22)
CALCIUM SERPL-MCNC: 9.5 MG/DL (ref 8.5–10.5)
CHLORIDE BLD-SCNC: 103 MEQ/L (ref 98–111)
CO2: 27 MEQ/L (ref 23–33)
CREAT SERPL-MCNC: 0.9 MG/DL (ref 0.4–1.2)
GFR SERPL CREATININE-BSD FRML MDRD: > 90 ML/MIN/1.73M2
GLUCOSE BLD-MCNC: 106 MG/DL (ref 70–108)
POTASSIUM SERPL-SCNC: 4.4 MEQ/L (ref 3.5–5.2)
SODIUM BLD-SCNC: 136 MEQ/L (ref 135–145)

## 2021-08-30 PROCEDURE — 36415 COLL VENOUS BLD VENIPUNCTURE: CPT

## 2021-08-30 PROCEDURE — 80048 BASIC METABOLIC PNL TOTAL CA: CPT

## 2021-11-22 ENCOUNTER — HOSPITAL ENCOUNTER (EMERGENCY)
Age: 51
Discharge: HOME | End: 2021-11-22
Attending: EMERGENCY MEDICINE
Payer: COMMERCIAL

## 2021-11-22 ENCOUNTER — NURSE TRIAGE (OUTPATIENT)
Dept: OTHER | Facility: CLINIC | Age: 51
End: 2021-11-22

## 2021-11-22 VITALS
HEART RATE: 66 BPM | WEIGHT: 195 LBS | BODY MASS INDEX: 28.88 KG/M2 | OXYGEN SATURATION: 99 % | HEIGHT: 69 IN | TEMPERATURE: 97.9 F | DIASTOLIC BLOOD PRESSURE: 75 MMHG | RESPIRATION RATE: 17 BRPM | SYSTOLIC BLOOD PRESSURE: 112 MMHG

## 2021-11-22 DIAGNOSIS — I48.20 CHRONIC ATRIAL FIBRILLATION (HCC): Primary | ICD-10-CM

## 2021-11-22 LAB
ALBUMIN SERPL-MCNC: 4.4 G/DL (ref 3.5–5.1)
ALP BLD-CCNC: 65 U/L (ref 38–126)
ALT SERPL-CCNC: 13 U/L (ref 11–66)
ANION GAP SERPL CALCULATED.3IONS-SCNC: 11 MEQ/L (ref 8–16)
AST SERPL-CCNC: 16 U/L (ref 5–40)
BASOPHILS # BLD: 0.6 %
BASOPHILS ABSOLUTE: 0 THOU/MM3 (ref 0–0.1)
BILIRUB SERPL-MCNC: 0.5 MG/DL (ref 0.3–1.2)
BUN BLDV-MCNC: 15 MG/DL (ref 7–22)
CALCIUM SERPL-MCNC: 9.3 MG/DL (ref 8.5–10.5)
CHLORIDE BLD-SCNC: 105 MEQ/L (ref 98–111)
CO2: 25 MEQ/L (ref 23–33)
CREAT SERPL-MCNC: 1.1 MG/DL (ref 0.4–1.2)
EKG Q-T INTERVAL: 342 MS
EKG QRS DURATION: 74 MS
EKG QTC CALCULATION (BAZETT): 406 MS
EKG R AXIS: -32 DEGREES
EKG T AXIS: 21 DEGREES
EKG VENTRICULAR RATE: 85 BPM
EOSINOPHIL # BLD: 1 %
EOSINOPHILS ABSOLUTE: 0.1 THOU/MM3 (ref 0–0.4)
ERYTHROCYTE [DISTWIDTH] IN BLOOD BY AUTOMATED COUNT: 12.9 % (ref 11.5–14.5)
ERYTHROCYTE [DISTWIDTH] IN BLOOD BY AUTOMATED COUNT: 45.5 FL (ref 35–45)
GLUCOSE BLD-MCNC: 82 MG/DL (ref 70–108)
HCT VFR BLD CALC: 49.5 % (ref 42–52)
HEMOGLOBIN: 16.4 GM/DL (ref 14–18)
IMMATURE GRANS (ABS): 0.01 THOU/MM3 (ref 0–0.07)
IMMATURE GRANULOCYTES: 0.1 %
LYMPHOCYTES # BLD: 22 %
LYMPHOCYTES ABSOLUTE: 1.6 THOU/MM3 (ref 1–4.8)
MAGNESIUM: 2.3 MG/DL (ref 1.6–2.4)
MCH RBC QN AUTO: 31.5 PG (ref 26–33)
MCHC RBC AUTO-ENTMCNC: 33.1 GM/DL (ref 32.2–35.5)
MCV RBC AUTO: 95 FL (ref 80–94)
MONOCYTES # BLD: 6.2 %
MONOCYTES ABSOLUTE: 0.4 THOU/MM3 (ref 0.4–1.3)
NUCLEATED RED BLOOD CELLS: 0 /100 WBC
OSMOLALITY CALCULATION: 281.2 MOSMOL/KG (ref 275–300)
PLATELET # BLD: 249 THOU/MM3 (ref 130–400)
PMV BLD AUTO: 9.6 FL (ref 9.4–12.4)
POTASSIUM SERPL-SCNC: 4.7 MEQ/L (ref 3.5–5.2)
PRO-BNP: 402.6 PG/ML (ref 0–450)
RBC # BLD: 5.21 MILL/MM3 (ref 4.7–6.1)
SEG NEUTROPHILS: 70.1 %
SEGMENTED NEUTROPHILS ABSOLUTE COUNT: 5 THOU/MM3 (ref 1.8–7.7)
SODIUM BLD-SCNC: 141 MEQ/L (ref 135–145)
TOTAL PROTEIN: 7.1 G/DL (ref 6.1–8)
TROPONIN T: < 0.01 NG/ML
TSH SERPL DL<=0.05 MIU/L-ACNC: 1.25 UIU/ML (ref 0.4–4.2)
WBC # BLD: 7.1 THOU/MM3 (ref 4.8–10.8)

## 2021-11-22 PROCEDURE — 83735 ASSAY OF MAGNESIUM: CPT

## 2021-11-22 PROCEDURE — 85025 COMPLETE CBC W/AUTO DIFF WBC: CPT

## 2021-11-22 PROCEDURE — 84484 ASSAY OF TROPONIN QUANT: CPT

## 2021-11-22 PROCEDURE — 80053 COMPREHEN METABOLIC PANEL: CPT

## 2021-11-22 PROCEDURE — 83880 ASSAY OF NATRIURETIC PEPTIDE: CPT

## 2021-11-22 PROCEDURE — 6370000000 HC RX 637 (ALT 250 FOR IP): Performed by: EMERGENCY MEDICINE

## 2021-11-22 PROCEDURE — 93005 ELECTROCARDIOGRAM TRACING: CPT | Performed by: EMERGENCY MEDICINE

## 2021-11-22 PROCEDURE — 99283 EMERGENCY DEPT VISIT LOW MDM: CPT

## 2021-11-22 PROCEDURE — 36415 COLL VENOUS BLD VENIPUNCTURE: CPT

## 2021-11-22 PROCEDURE — 84443 ASSAY THYROID STIM HORMONE: CPT

## 2021-11-22 RX ADMIN — APIXABAN 5 MG: 5 TABLET, FILM COATED ORAL at 13:13

## 2021-11-22 NOTE — Clinical Note
Heidy Choi was seen and treated in our emergency department on 11/22/2021. He may return to work on 11/24/2021. If you have any questions or concerns, please don't hesitate to call.       Oly Cifuentes MD

## 2021-11-22 NOTE — TELEPHONE ENCOUNTER
Reason for Disposition   Difficulty breathing    Answer Assessment - Initial Assessment Questions  1. DESCRIPTION: \"Please describe your heart rate or heart beat that you are having\" (e.g., fast/slow, regular/irregular, skipped or extra beats, \"palpitations\")      \"I'm in afib\" since yesterday, SOB, tightness with deep breath, watch zara states afib as well. Since yesterday. Denies chest pain/pressure/dizziness. Current  afib with ablation 2019. Hasn't had a fib since ablation      2. ONSET: \"When did it start? \" (Minutes, hours or days)       Yesterday    3. DURATION: \"How long does it last\" (e.g., seconds, minutes, hours)      Constant    4. PATTERN \"Does it come and go, or has it been constant since it started? \"  \"Does it get worse with exertion? \"   \"Are you feeling it now? \"      Constant    5. TAP: \"Using your hand, can you tap out what you are feeling on a chair or table in front of you, so that I can hear? \" (Note: not all patients can do this)       Knows it's irreg    6. HEART RATE: \"Can you tell me your heart rate? \" \"How many beats in 15 seconds? \"  (Note: not all patients can do this)         this AM    7. RECURRENT SYMPTOM: \"Have you ever had this before? \" If so, ask: \"When was the last time? \" and \"What happened that time? \"       Yes    8. CAUSE: \"What do you think is causing the palpitations? \"     A fib    9. CARDIAC HISTORY: \"Do you have any history of heart disease? \" (e.g., heart attack, angina, bypass surgery, angioplasty, arrhythmia)      afib with ablation 2019. Hasn't had a fib since ablation    10. OTHER SYMPTOMS: \"Do you have any other symptoms? \" (e.g., dizziness, chest pain, sweating, difficulty breathing)        See above    11. PREGNANCY: \"Is there any chance you are pregnant? \" \"When was your last menstrual period? \"        n/a    Protocols used: HEART RATE AND HEARTBEAT QUESTIONS-ADULT-[place]    Received call from suhail at [facility] with Red Flag Complaint.     Brief description of triage:\"I'm in afib\" since yesterday, SOB, tightness with deep breath, watch zara states afib as well. Since yesterday. Denies chest pain/pressure/dizziness. Current  History afib with ablation 2019. Hasn't had a fib since ablation     Triage indicates for patient to go to ED now. Pt agrees. Care advice provided, patient verbalizes understanding; denies any other questions or concerns; instructed to call back for any new or worsening symptoms. Attention Provider: Thank you for allowing me to participate in the care of your patient. The patient was connected to triage in response to information provided to the ECC/PSC. Please do not respond through this encounter as the response is not directed to a shared pool.

## 2021-11-22 NOTE — ED PROVIDER NOTES
[id number]      PATIENT NAME: Abby Blancas  MRN: [id number]  : 1970  ROBLES: 2021  PROVIDER: Jenny Lynch MD      CHIEF COMPLAINT       Chief Complaint   Patient presents with    Atrial Fibrillation       Patient is seen and evaluated in a timely fashion. Nurses Notes are reviewed and I agree except as noted in the HPI. HISTORY OF PRESENT ILLNESS    Abby Blancas is a 54 y.o. male who presents to Emergency Department with Atrial Fibrillation    This patient is a 61-year-old male presenting to ED for evaluation of atrial fibrillation. Patient states he felt heart palpating and racing yesterday. He has some beer the night before. He currently is asymptomatic. His past medical history is remarkable for atrial fibrillation status post cryoballoon ablation of pulmonary veins on 3/3/2015 at [facility], remote CVA, nonobstructive coronary artery disease, and cardioversion for Afib. He was off anticoagulation recommended by his cardiologist Dr. Romulo Hayward for about a yea ago. Otherwise, no fever, no chills, no cough, no shortness of breath. No abdominal pain. No nausea or vomiting. No blurred vision, no slurred speech, no extremity weakness. This HPI was provided by patient.      REVIEW OF SYSTEMS   Ten-point review of systems is negative except those documented in above HPI including constitutional, HEENT, respiratory, cardiovascular, gastrointestinal, genitourinary, musculoskeletal, skin, neurological, hematological and behavioral.      PAST MEDICAL HISTORY     Past Medical History:   Diagnosis Date    Atrial fibrillation ([address]     Cerebral artery occlusion with cerebral infarction ([address]     CHF (NYHA class III, ACC/AHA stage C) ([address] 2016    Episodic headache 2016    Hyperlipidemia     Hypertension        SURGICAL HISTORY       Past Surgical History:   Procedure Laterality Date    CARDIOVERSION      LAPAROSCOPIC unknown. He indicated that the status of his paternal aunt is unknown.   family history includes Cancer in his maternal aunt; Diabetes in his paternal aunt; Heart Disease in his father; Heart Failure in his maternal grandmother. SOCIAL HISTORY      reports that he quit smoking about 21 years ago. He has a 2.00 pack-year smoking history. He has never used smokeless tobacco. He reports current alcohol use. He reports previous drug use. Drug: Marijuana [facility] [place]). PHYSICAL EXAM      height is 5' 9\" (1.753 m) and weight is 195 lb (88.5 kg). His oral temperature is 97.9 °F (36.6 °C). His blood pressure is 112/75 and his pulse is 66. His respiration is 17 and oxygen saturation is 99%. Physical Exam  Vitals and nursing note reviewed. Constitutional:       Appearance: He is well-developed. He is not diaphoretic. HENT:      Head: Normocephalic and atraumatic. Nose: Nose normal.   Eyes:      General: No scleral icterus. Right eye: No discharge. Left eye: No discharge. Conjunctiva/sclera: Conjunctivae normal.      Pupils: Pupils are equal, round, and reactive to light. Neck:      Vascular: No JVD. Trachea: No tracheal deviation. Cardiovascular:      Rate and Rhythm: Normal rate. Rhythm irregular. Heart sounds: Normal heart sounds. No murmur heard. No friction rub. No gallop. Pulmonary:      Effort: Pulmonary effort is normal. No respiratory distress. Breath sounds: Normal breath sounds. No stridor. No wheezing or rales. Chest:      Chest wall: No tenderness. Abdominal:      General: Bowel sounds are normal. There is no distension. Palpations: Abdomen is soft. There is no mass. Tenderness: There is no abdominal tenderness. There is no guarding or rebound. Hernia: No hernia is present. Musculoskeletal:         General: No tenderness or deformity. Cervical back: Normal range of motion and neck supple.    Lymphadenopathy:      Cervical: No cervical adenopathy. Skin:     General: Skin is warm and dry. Capillary Refill: Capillary refill takes less than 2 seconds. Coloration: Skin is not pale. Findings: No erythema or rash. Neurological:      Mental Status: He is alert and oriented to person, place, and time. Cranial Nerves: No cranial nerve deficit. Sensory: No sensory deficit. Motor: No abnormal muscle tone. Coordination: Coordination normal.      Deep Tendon Reflexes: Reflexes normal.   Psychiatric:         Behavior: Behavior normal.         Thought Content: Thought content normal.         Judgment: Judgment normal.       ANCILLARY TEST RESULTS   EKG:    Interpreted by me  Atrial fibrillation, ventricular rate 85 bpm, QRS duration 74 ms, QT 4 6 ms, no ST elevation or acute T wave    LAB RESULTS:  Results for orders placed or performed during the hospital encounter of 11/22/21   CBC Auto Differential   Result Value Ref Range    WBC 7.1 4.8 - 10.8 thou/mm3    RBC 5.21 4.70 - 6.10 mill/mm3    Hemoglobin 16.4 14.0 - 18.0 gm/dl    Hematocrit 49.5 42.0 - 52.0 %    MCV 95.0 (H) 80.0 - 94.0 fL    MCH 31.5 26.0 - 33.0 pg    MCHC 33.1 32.2 - 35.5 gm/dl    RDW-CV 12.9 11.5 - 14.5 %    RDW-SD 45.5 (H) 35.0 - 45.0 fL    Platelets 114 015 - 889 thou/mm3    MPV 9.6 9.4 - 12.4 fL    Seg Neutrophils 70.1 %    Lymphocytes 22.0 %    Monocytes 6.2 %    Eosinophils 1.0 %    Basophils 0.6 %    Immature Granulocytes 0.1 %    Segs Absolute 5.0 1.8 - 7.7 thou/mm3    Lymphocytes Absolute 1.6 1.0 - 4.8 thou/mm3    Monocytes Absolute 0.4 0.4 - 1.3 thou/mm3    Eosinophils Absolute 0.1 0.0 - 0.4 thou/mm3    Basophils Absolute 0.0 0.0 - 0.1 thou/mm3    Immature Grans (Abs) 0.01 0.00 - 0.07 thou/mm3    nRBC 0 /100 wbc   Comprehensive Metabolic Panel   Result Value Ref Range    Glucose 82 70 - 108 mg/dL    CREATININE 1.1 0.4 - 1.2 mg/dL    BUN 15 7 - 22 mg/dL    Sodium 141 135 - 145 meq/L    Potassium 4.7 3.5 - 5.2 meq/L    Chloride 105 98 - 111 meq/L CO2 25 23 - 33 meq/L    Calcium 9.3 8.5 - 10.5 mg/dL    AST 16 5 - 40 U/L    Alkaline Phosphatase 65 38 - 126 U/L    Total Protein 7.1 6.1 - 8.0 g/dL    Albumin 4.4 3.5 - 5.1 g/dL    Total Bilirubin 0.5 0.3 - 1.2 mg/dL    ALT 13 11 - 66 U/L   Magnesium   Result Value Ref Range    Magnesium 2.3 1.6 - 2.4 mg/dL   TSH with Reflex   Result Value Ref Range    TSH 1.250 0.400 - 4.200 uIU/mL   Brain Natriuretic Peptide   Result Value Ref Range    Pro-.6 0.0 - 450.0 pg/mL   Troponin   Result Value Ref Range    Troponin T < 0.010 ng/ml   Anion Gap   Result Value Ref Range    Anion Gap 11.0 8.0 - 16.0 meq/L   Glomerular Filtration Rate, Estimated   Result Value Ref Range    Est, Glom Filt Rate 86 (A) ml/min/1.73m2   Osmolality   Result Value Ref Range    Osmolality Calc 281.2 275.0 - 300.0 mOsmol/kg   EKG 12 Lead   Result Value Ref Range    Ventricular Rate 85 BPM    QRS Duration 74 ms    Q-T Interval 342 ms    QTc Calculation (Bazett) 406 ms    R Axis -32 degrees    T Axis 21 degrees       RADIOLOGY REPORTS  No orders to display       ED COURSE AND MEDICAL DEDISION MAKING (MDM)     INITIAL ASSESSMENT AND PLAN (10:04 AM EST)  Differential Diagnosis: Recurrence of atrial fibrillation, alcohol abuse, electrolyte imbalance, hyperthyroidism, CHF,  Plan: Large-bore IV, telemetry monitor, ECG, labs, plan to restart anticoagulation    VITAL SIGNS  Vitals:    11/22/21 0939 11/22/21 1105   BP: 115/72 112/75   Pulse: 96 66   Resp: 18 17   Temp: 97.9 °F (36.6 °C)    TempSrc: Oral    SpO2: 100% 99%   Weight: 195 lb (88.5 kg)    Height: 5' 9\" (1.753 m)        MDM  Medications   apixaban (ELIQUIS) tablet 5 mg (5 mg Oral Given 11/22/21 1313)       ECG shows Afib with VR under control. Labs are reassuring. He is completely asymptomatic in ED. Anticoagulation is indicated. Eliquis is prescribed with first dose given in ED. Discharged with cardiology follow up appointment set up.        CRITICAL CARE None    CONSULTS   None    PROCEDURES   None    FINAL IMPRESSION AND DISPOSITION      1.  Chronic atrial fibrillation [facility])        Home    PATIENT REFERRED TO:  [address] [facility]  [place] [address]  [phone]  On 11/23/2021  At 9:30 in the morning, ED discharge follow-up for recurrent atrial fibrillation      DISCHARGE MEDICATIONS:  Discharge Medication List as of 11/22/2021 12:33 PM          (Please note that portions of this note were completed with a voice recognition program.  Efforts were made to edit the dictations but occasionally words aremis-transcribed.)    MD Santana Olson MD  11/22/21 3938

## 2021-11-22 NOTE — ED TRIAGE NOTES
Pt to the ED with a-fib. Pt states this started yesterday when he became SOb and sweaty. Pt states he was in a-fib in 2019. EKG complete.

## 2021-11-22 NOTE — Clinical Note
Be Hannah was seen and treated in our emergency department on 11/22/2021. He may return to work on 11/24/2021. If you have any questions or concerns, please don't hesitate to call.       Farrah Rhodes MD

## 2021-11-23 ENCOUNTER — OFFICE VISIT (OUTPATIENT)
Dept: CARDIOLOGY CLINIC | Age: 51
End: 2021-11-23
Payer: COMMERCIAL

## 2021-11-23 ENCOUNTER — PREP FOR PROCEDURE (OUTPATIENT)
Dept: CARDIOLOGY | Age: 51
End: 2021-11-23

## 2021-11-23 VITALS
DIASTOLIC BLOOD PRESSURE: 80 MMHG | HEIGHT: 69 IN | HEART RATE: 84 BPM | BODY MASS INDEX: 29.59 KG/M2 | SYSTOLIC BLOOD PRESSURE: 111 MMHG | WEIGHT: 199.8 LBS

## 2021-11-23 DIAGNOSIS — I10 PRIMARY HYPERTENSION: ICD-10-CM

## 2021-11-23 DIAGNOSIS — Z86.73 HISTORY OF CVA (CEREBROVASCULAR ACCIDENT): ICD-10-CM

## 2021-11-23 DIAGNOSIS — I48.0 PAF (PAROXYSMAL ATRIAL FIBRILLATION) (HCC): Primary | ICD-10-CM

## 2021-11-23 PROCEDURE — G8484 FLU IMMUNIZE NO ADMIN: HCPCS | Performed by: INTERNAL MEDICINE

## 2021-11-23 PROCEDURE — 1036F TOBACCO NON-USER: CPT | Performed by: INTERNAL MEDICINE

## 2021-11-23 PROCEDURE — G8417 CALC BMI ABV UP PARAM F/U: HCPCS | Performed by: INTERNAL MEDICINE

## 2021-11-23 PROCEDURE — 99204 OFFICE O/P NEW MOD 45 MIN: CPT | Performed by: INTERNAL MEDICINE

## 2021-11-23 PROCEDURE — G8427 DOCREV CUR MEDS BY ELIG CLIN: HCPCS | Performed by: INTERNAL MEDICINE

## 2021-11-23 RX ORDER — SODIUM CHLORIDE 0.9 % (FLUSH) 0.9 %
5-40 SYRINGE (ML) INJECTION PRN
Status: CANCELLED | OUTPATIENT
Start: 2021-11-23

## 2021-11-23 RX ORDER — SODIUM CHLORIDE 9 MG/ML
25 INJECTION, SOLUTION INTRAVENOUS PRN
Status: CANCELLED | OUTPATIENT
Start: 2021-11-23

## 2021-11-23 RX ORDER — SODIUM CHLORIDE 0.9 % (FLUSH) 0.9 %
5-40 SYRINGE (ML) INJECTION EVERY 12 HOURS SCHEDULED
Status: CANCELLED | OUTPATIENT
Start: 2021-11-23

## 2021-11-23 RX ORDER — SODIUM CHLORIDE 9 MG/ML
INJECTION, SOLUTION INTRAVENOUS CONTINUOUS
Status: CANCELLED | OUTPATIENT
Start: 2021-11-23

## 2021-11-23 NOTE — PROGRESS NOTES
Chief Complaint   Patient presents with    New Patient    Chest Pain   dR. Sakina FRANCIS  AND DR Nessa Lazaro OUT OF TOWN    His past medical history is remarkable for atrial fibrillation status post cryo-THERAPY ablation of pulmonary veins on 3/3/2015 at [address] [place] [address], remote CVA, nonobstructive coronary artery disease, and cardioversion for Afib. SEEN IN THE ed 21 FOR PALPITATION  AND IRREGULAT BEATS AND SENT HOME WITH RESTARTING APIXABAN    NEW PATIENT FOLLOW UP FROM HOSPITAL ED FOR A-FIB. DENIED CP, SOB, DIZZINESS OR EDEMA    PALPITATION GET BETTER    EKG DONE 2021.     NONSMOKER      fhx  FATHER HAD chf  NONE FOR cad      Past Surgical History:   Procedure Laterality Date    CARDIOVERSION      LAPAROSCOPIC APPENDECTOMY N/A 2020    APPENDECTOMY LAPAROSCOPIC converted to open performed by Kurt Pereyra MD at [address]         Allergies   Allergen Reactions    Albuterol      Per patient raises my blood pressure        Family History   Problem Relation Age of Onset    Heart Disease Father     Cancer Maternal Aunt     Heart Failure Maternal Grandmother     Diabetes Paternal Aunt         Social History     Socioeconomic History    Marital status:      Spouse name: Nay    Number of children: 10    Years of education: Not on file    Highest education level: Not on file   Occupational History    Not on file   Tobacco Use    Smoking status: Former Smoker     Packs/day: 0.50     Years: 4.00     Pack years: 2.00     Quit date:      Years since quittin.9    Smokeless tobacco: Never Used   Vaping Use    Vaping Use: Never used   Substance and Sexual Activity    Alcohol use: Yes     Comment: SOCIALLY    Drug use: Not Currently     Types: Marijuana Jase Anderson     Comment: hx of     Sexual activity: Yes     Partners: Female   Other Topics Concern    Not on file   Social History Narrative    Not on file     Social Determinants of Health (NITROSTAT) 0.4 MG SL tablet Dissolve 1 tablet under tongue for chest pain and repeat every 5 min up to max of 3 total doses. If no relief after 3 doses call 911 25 tablet 3    bumetanide (BUMEX) 2 MG tablet Take 1 tablet by mouth daily (Patient taking differently: Take 2 mg by mouth daily as needed ) 30 tablet 3    potassium chloride (KLOR-CON M) 20 MEQ extended release tablet Take 1 tablet by mouth daily 60 tablet 3    hydrOXYzine (VISTARIL) 25 MG capsule Take 1 capsule by mouth 3 times daily as needed for Anxiety 60 capsule 2     No current facility-administered medications for this visit. Review of Systems -     General ROS: negative  Psychological ROS: negative  Hematological and Lymphatic ROS: No history of blood clots or bleeding disorder. Respiratory ROS: no cough,  or wheezing, the rest see HPI  Cardiovascular ROS: See HPI  Gastrointestinal ROS: negative  Genito-Urinary ROS: no dysuria, trouble voiding, or hematuria  Musculoskeletal ROS: negative  Neurological ROS: no TIA or stroke symptoms  Dermatological ROS: negative      Blood pressure 111/80, pulse 84, height 5' 9\" (1.753 m), weight 199 lb 12.8 oz (90.6 kg).         Physical Examination:    General appearance - alert, well appearing, and in no distress  HEENT- Pink conjunctiva  , Non-icteri sclera,PERRLA  Mental status - alert, oriented to person, place, and time  Neck - supple, no significant adenopathy, no JVD, or carotid bruits  Chest - clear to auscultation, no wheezes, rales or rhonchi, symmetric air entry  Heart - normal rate, regular rhythm, normal S1, S2, no murmurs, rubs, clicks or gallops  Abdomen - soft, nontender, nondistended, no masses or organomegaly  ELLIOTT- no CVA or flank tenderness, no suprapubic tenderness  Neurological - alert, oriented, normal speech, no focal findings or movement disorder noted  Musculoskeletal/limbs - no joint tenderness, deformity or swelling   - peripheral pulses normal, no pedal edema, no clubbing or cyanosis  Skin - normal coloration and turgor, no rashes, no suspicious skin lesions noted  Psych- appropriate mood and affect    Lab  No results for input(s): CKTOTAL, CKMB, CKMBINDEX, TROPONINI in the last 72 hours. CBC:   Lab Results   Component Value Date    WBC 7.1 11/22/2021    RBC 5.21 11/22/2021    HGB 16.4 11/22/2021    HCT 49.5 11/22/2021    MCV 95.0 11/22/2021    MCH 31.5 11/22/2021    MCHC 33.1 11/22/2021    RDW 13.5 01/19/2017     11/22/2021    MPV 9.6 11/22/2021     BMP:    Lab Results   Component Value Date     11/22/2021    K 4.7 11/22/2021    K 3.9 05/31/2020     11/22/2021    CO2 25 11/22/2021    BUN 15 11/22/2021    LABALBU 4.4 11/22/2021    CREATININE 1.1 11/22/2021    CALCIUM 9.3 11/22/2021    LABGLOM 71 11/22/2021    GLUCOSE 82 11/22/2021     Hepatic Function Panel:    Lab Results   Component Value Date    ALKPHOS 65 11/22/2021    ALT 13 11/22/2021    AST 16 11/22/2021    PROT 7.1 11/22/2021    BILITOT 0.5 11/22/2021    BILIDIR <0.2 02/07/2020    LABALBU 4.4 11/22/2021     Magnesium:    Lab Results   Component Value Date    MG 2.3 11/22/2021     Warfarin PT/INR:  No components found for: PTPATWAR, PTINRWAR  HgBA1c:  No results found for: LABA1C  FLP:    Lab Results   Component Value Date    TRIG 156 07/25/2019    HDL 57 07/25/2019    LDLCALC 70 07/25/2019    LDLDIRECT 150 01/19/2017     TSH:    Lab Results   Component Value Date    TSH 1.250 11/22/2021        DATE OF PROCEDURE:  03/03/2020     LOCATION:  EP Lab at [address] [place] [address].     :  Adalgisa Brown MD     ASSISTANT:  None.     REFERRING PROVIDER:  Brittany Elijah. Brayden Ferris MD     PREOPERATIVE DIAGNOSES:  1. Symptomatic persistent atrial fibrillation. 2.  Hypertension. 3.  Stroke. 4.  Hyperlipidemia.     POSTOPERATIVE DIAGNOSES:  1. Symptomatic persistent atrial fibrillation. 2.  Hypertension. 3.  Stroke. 4.  Hyperlipidemia.     PROCEDURES:  1.   Pulmonary vein isolation using cryoballoon with confirmation of  pulmonary vein isolation using circular catheter (Achieve catheters). 2.  Cardioversion using synchronized 200 joules. 3.  Electrophysiology study. Post cardioversion, the patient's cycle  length was 620 msec, MA interval 140 msec, QRS duration 86 msec, QT  interval 380 msec. Sinus node recovery time was normal, where the  longest one had 830 msec. Normal AV node function was AV block at 320  msec. Fast pathway was 220 msec and driving train at 595 msec.     Normal concentric decremental retrograde conduction during ventricular  pacing with a VA block at 500 msec. Conclusions      Summary   Ejection fraction is visually estimated at 55%. Overall left ventricular function is normal.   Left atrial size was normal with no thrombus identified. APPENDAGE: The   left atrial appendage size was normal with no thrombus identified. DOPPLER: The function was normal (normal emptying velocity). Signature      ----------------------------------------------------------------   Electronically signed by Marie Iglesias MD (Interpreting   physician) on 03/02/2020 at 05:26 PM   ----------------------------------------------------------------        EKG 11/22/21  Atrial fibrillation RATE 85 Left axis deviation Abnormal ECG When compared with ECG of 31-MAY-2020 14:04, Atrial fibrillation has replaced Sinus rhythm    Assessment   Diagnosis Orders   1. PAF (paroxysmal atrial fibrillation) ([address] S/P pvi 03/2020 AND NOW RECURRENCE 11/22/21 WITH cvr     2. History of CVA (cerebrovascular accident)     3. Primary hypertension           Plan     MEDS AND LAB AND ED RECORED REVIEWED    (paroxysmal atrial fibrillation) ([facility]) S/P pvi 03/2020 AND NOW RECURRENCE 11/22/21 WITH cvr  NEED ángel- cv  RESUMED APIXABAN 5 BID YESTERDAY    CONSIDER pvi IF THERE IS A RECURRENCE OF ABLATION  REFER TO dR. Szymanski FOR LONG TERM PLAN OF MANAGEMENT FOR ATR FIB      CONT CARDIZEM    Hypertension, on medical treatment.  Seems to be under good control. Patient is compliant with medical treatment. rtc IN 3  WITH DR. Sakina Andrews MD,MD,FACC

## 2021-11-24 ENCOUNTER — ANESTHESIA (OUTPATIENT)
Dept: CARDIAC CATH/INVASIVE PROCEDURES | Age: 51
End: 2021-11-24

## 2021-11-24 ENCOUNTER — APPOINTMENT (OUTPATIENT)
Dept: CARDIAC CATH/INVASIVE PROCEDURES | Age: 51
End: 2021-11-24

## 2021-11-24 ENCOUNTER — ANESTHESIA EVENT (OUTPATIENT)
Dept: CARDIAC CATH/INVASIVE PROCEDURES | Age: 51
End: 2021-11-24

## 2021-11-24 LAB — GFR SERPL CREATININE-BSD FRML MDRD: 86 ML/MIN/1.73M2

## 2021-11-24 PROCEDURE — 6360000002 HC RX W HCPCS: Performed by: NURSE ANESTHETIST, CERTIFIED REGISTERED

## 2021-11-24 PROCEDURE — 3700000001 HC ADD 15 MINUTES (ANESTHESIA)

## 2021-11-24 PROCEDURE — 2500000003 HC RX 250 WO HCPCS: Performed by: NURSE ANESTHETIST, CERTIFIED REGISTERED

## 2021-11-24 PROCEDURE — 3700000000 HC ANESTHESIA ATTENDED CARE

## 2021-11-24 RX ORDER — PROPOFOL 10 MG/ML
INJECTION, EMULSION INTRAVENOUS PRN
Status: DISCONTINUED | OUTPATIENT
Start: 2021-11-24 | End: 2021-11-24 | Stop reason: SDUPTHER

## 2021-11-24 RX ORDER — LIDOCAINE HYDROCHLORIDE 20 MG/ML
INJECTION, SOLUTION INFILTRATION; PERINEURAL PRN
Status: DISCONTINUED | OUTPATIENT
Start: 2021-11-24 | End: 2021-11-24 | Stop reason: SDUPTHER

## 2021-11-24 RX ADMIN — PROPOFOL 260 MG: 10 INJECTION, EMULSION INTRAVENOUS at 14:51

## 2021-11-24 RX ADMIN — LIDOCAINE HYDROCHLORIDE 100 MG: 20 INJECTION, SOLUTION INFILTRATION; PERINEURAL at 14:51

## 2021-11-24 NOTE — ANESTHESIA PRE PROCEDURE
Department of Anesthesiology  Preprocedure Note       Name:  Delaney Sanchez   Age:  54 y.o.  :  1970                                          MRN:  [id number]         Date:  2021      Surgeon: * Surgery not found *    Procedure:     Medications prior to admission:   Prior to Admission medications    Medication Sig Start Date End Date Taking? Authorizing Provider   apixaban (ELIQUIS) 5 MG TABS tablet Take 1 tablet by mouth 2 times daily 21 Yes Issa Way MD   losartan (COZAAR) 50 MG tablet Take 25 mg by mouth daily  20  Yes Historical Provider, MD   metoprolol tartrate (LOPRESSOR) 50 MG tablet Take 50 mg by mouth 2 times daily 16  Yes Historical Provider, MD   acetaminophen (TYLENOL) 500 MG tablet Take 500 mg by mouth every 6 hours as needed for Pain   Yes Historical Provider, MD   diltiazem (CARDIZEM) 60 MG tablet Take 60 mg by mouth 2 times daily   Yes Historical Provider, MD   aspirin 81 MG chewable tablet Take 1 tablet by mouth daily 16  Yes Nish Costa MD   potassium chloride (KLOR-CON M) 20 MEQ extended release tablet Take 1 tablet by mouth daily 16  Yes Nish Costa MD   nitroGLYCERIN (NITROSTAT) 0.4 MG SL tablet Dissolve 1 tablet under tongue for chest pain and repeat every 5 min up to max of 3 total doses.   If no relief after 3 doses call 911 16   Nish Costa MD   bumetanide (BUMEX) 2 MG tablet Take 1 tablet by mouth daily  Patient taking differently: Take 2 mg by mouth daily as needed  16   Nish Costa MD       Current medications:    Current Facility-Administered Medications   Medication Dose Route Frequency Provider Last Rate Last Admin    0.9 % sodium chloride infusion   IntraVENous Continuous Marisol L Hempfling, APRN - CNP 75 mL/hr at 21 1307 New Bag at 21 1307    0.9 % sodium chloride infusion  25 mL IntraVENous PRN Marisol L Hempfling, APRN - CNP        sodium chloride flush 0.9 % injection 5-40 mL  5-40 mL IntraVENous 2 times per day Marisol L Hempfling, APRN - CNP        sodium chloride flush 0.9 % injection 5-40 mL  5-40 mL IntraVENous PRN Marisol L Hempfling, APRN - CNP           Allergies:     Allergies   Allergen Reactions    Albuterol      Per patient raises my blood pressure       Problem List:    Patient Active Problem List   Diagnosis Code    Persistent atrial fibrillation (HCC) I48.19    Episodic headache R51.9    Cerebrovascular accident (CVA) due to embolism of left middle cerebral artery (HCC) X91.570    CHF (NYHA class III, ACC/AHA stage C) (HCC) I50.9    MR (mitral regurgitation) I34.0    Atrial fibrillation (HCC) I48.91    Abdominal pain, right lower quadrant R10.31    S/P appendectomy Z90.49    PAF (paroxysmal atrial fibrillation) ([address] S/P pvi 2020 AND NOW RECURRENCE 21 WITH cvr I48.0    History of CVA (cerebrovascular accident) Z80.78    Primary hypertension I10       Past Medical History:        Diagnosis Date    Atrial fibrillation ([address]     Cerebral artery occlusion with cerebral infarction ([address]     CHF (NYHA class III, ACC/AHA stage C) ([address] 2016    Episodic headache 2016    Hyperlipidemia     Hypertension        Past Surgical History:        Procedure Laterality Date    ABLATION OF DYSRHYTHMIC FOCUS      cyroablation    CARDIOVERSION      LAPAROSCOPIC APPENDECTOMY N/A 2020    APPENDECTOMY LAPAROSCOPIC converted to open performed by Chester Barajas MD at [address] [place] [address]         Social History:    Social History     Tobacco Use    Smoking status: Former Smoker     Packs/day: 0.50     Years: 4.00     Pack years: 2.00     Quit date:      Years since quittin.9    Smokeless tobacco: Never Used   Substance Use Topics    Alcohol use: Yes     Comment: SOCIALLY                                Counseling given: Not Answered      Vital Signs (Current):   Vitals:    21 1215 21 1230   BP:  103/63   Pulse: 68   Resp:  18   Temp:  36.9 °C (98.5 °F)   TempSrc:  Oral   SpO2:  100%   Weight: 199 lb (90.3 kg)    Height: 5' 9\" (1.753 m)                                               BP Readings from Last 3 Encounters:   11/24/21 103/63   11/23/21 111/80   11/22/21 112/75       NPO Status:                                                                                 BMI:   Wt Readings from Last 3 Encounters:   11/24/21 199 lb (90.3 kg)   11/23/21 199 lb 12.8 oz (90.6 kg)   11/22/21 195 lb (88.5 kg)     Body mass index is 29.39 kg/m². CBC:   Lab Results   Component Value Date    WBC 7.4 11/24/2021    RBC 4.88 11/24/2021    HGB 15.5 11/24/2021    HCT 45.9 11/24/2021    MCV 94.1 11/24/2021    RDW 13.5 01/19/2017     11/24/2021       CMP:   Lab Results   Component Value Date     11/22/2021    K 4.7 11/22/2021    K 3.9 05/31/2020     11/22/2021    CO2 25 11/22/2021    BUN 15 11/22/2021    CREATININE 1.1 11/22/2021    LABGLOM 86 11/22/2021    GLUCOSE 82 11/22/2021    PROT 7.1 11/22/2021    CALCIUM 9.3 11/22/2021    BILITOT 0.5 11/22/2021    ALKPHOS 65 11/22/2021    AST 16 11/22/2021    ALT 13 11/22/2021       POC Tests: No results for input(s): POCGLU, POCNA, POCK, POCCL, POCBUN, POCHEMO, POCHCT in the last 72 hours.     Coags:   Lab Results   Component Value Date    INR 1.46 11/24/2021    APTT 36.1 05/31/2020       HCG (If Applicable): No results found for: PREGTESTUR, PREGSERUM, HCG, HCGQUANT     ABGs: No results found for: PHART, PO2ART, HVW3XCS, SOK3KWG, BEART, W2BLPVTY     Type & Screen (If Applicable):  Lab Results   Component Value Date    LABRH POS 07/25/2019       Drug/Infectious Status (If Applicable):  No results found for: HIV, HEPCAB    COVID-19 Screening (If Applicable):   Lab Results   Component Value Date    COVID19 NOT DETECTED 05/31/2020           Anesthesia Evaluation  Patient summary reviewed  Airway: Mallampati: III  TM distance: >3 FB     Mouth opening: > = 3 FB Dental: Pulmonary:                              Cardiovascular:    (+) hypertension:, CHF:,                   Neuro/Psych:   (+) CVA:, headaches:,             GI/Hepatic/Renal:             Endo/Other:                     Abdominal:             Vascular: Other Findings:             Anesthesia Plan      MAC     ASA 3       Induction: intravenous. Anesthetic plan and risks discussed with patient. Plan discussed with attending.                   ISABEL Donahue - ARSALAN   11/24/2021

## 2021-11-29 ENCOUNTER — TELEPHONE (OUTPATIENT)
Dept: CARDIOLOGY CLINIC | Age: 51
End: 2021-11-29

## 2021-11-29 NOTE — TELEPHONE ENCOUNTER
Patient was referred to Dr Davida Mcintyre for Lee patient -  But was referred to use [facility]  Mimi Cure covering for Shiva Ponce while out of town)

## 2021-12-01 NOTE — TELEPHONE ENCOUNTER
LMOM to call office    Per Desirae, referral from Mike Forte [address] for at [facility], ok to schedule this pt for office visit

## 2021-12-01 NOTE — TELEPHONE ENCOUNTER
Pt is scheduled for apt 1.5.2022-  Patient wanted to wait until after the 1st of the year due to work

## 2022-01-08 ENCOUNTER — HOSPITAL ENCOUNTER (EMERGENCY)
Age: 52
Discharge: HOME | End: 2022-01-08
Payer: COMMERCIAL

## 2022-01-08 VITALS
HEART RATE: 97 BPM | WEIGHT: 191 LBS | OXYGEN SATURATION: 98 % | BODY MASS INDEX: 28.29 KG/M2 | SYSTOLIC BLOOD PRESSURE: 126 MMHG | HEIGHT: 69 IN | RESPIRATION RATE: 18 BRPM | DIASTOLIC BLOOD PRESSURE: 80 MMHG | TEMPERATURE: 97.9 F

## 2022-01-08 DIAGNOSIS — J06.9 VIRAL URI WITH COUGH: Primary | ICD-10-CM

## 2022-01-08 LAB
GROUP A STREP CULTURE, REFLEX: NEGATIVE
REFLEX THROAT C + S: NORMAL

## 2022-01-08 PROCEDURE — 99213 OFFICE O/P EST LOW 20 MIN: CPT

## 2022-01-08 PROCEDURE — 87880 STREP A ASSAY W/OPTIC: CPT

## 2022-01-08 PROCEDURE — 87070 CULTURE OTHR SPECIMN AEROBIC: CPT

## 2022-01-08 PROCEDURE — 87636 SARSCOV2 & INF A&B AMP PRB: CPT

## 2022-01-08 PROCEDURE — 99213 OFFICE O/P EST LOW 20 MIN: CPT | Performed by: NURSE PRACTITIONER

## 2022-01-08 PROCEDURE — G0463 HOSPITAL OUTPT CLINIC VISIT: HCPCS

## 2022-01-08 RX ORDER — CETIRIZINE HYDROCHLORIDE 10 MG/1
10 TABLET ORAL DAILY
Qty: 30 TABLET | Refills: 0 | Status: SHIPPED | OUTPATIENT
Start: 2022-01-08 | End: 2022-02-07

## 2022-01-08 RX ORDER — FLUTICASONE PROPIONATE 50 MCG
1 SPRAY, SUSPENSION (ML) NASAL DAILY
Qty: 16 G | Refills: 0 | Status: SHIPPED | OUTPATIENT
Start: 2022-01-08 | End: 2022-05-24

## 2022-01-08 ASSESSMENT — PAIN DESCRIPTION - ONSET: ONSET: GRADUAL

## 2022-01-08 ASSESSMENT — ENCOUNTER SYMPTOMS
COUGH: 1
DIARRHEA: 0
VOMITING: 0
SORE THROAT: 1
CHEST TIGHTNESS: 0
SHORTNESS OF BREATH: 0
NAUSEA: 0
RHINORRHEA: 0

## 2022-01-08 ASSESSMENT — PAIN DESCRIPTION - FREQUENCY: FREQUENCY: INTERMITTENT

## 2022-01-08 ASSESSMENT — PAIN DESCRIPTION - PAIN TYPE: TYPE: ACUTE PAIN

## 2022-01-08 ASSESSMENT — PAIN DESCRIPTION - LOCATION: LOCATION: THROAT;GENERALIZED

## 2022-01-08 ASSESSMENT — PAIN DESCRIPTION - PROGRESSION: CLINICAL_PROGRESSION: GRADUALLY WORSENING

## 2022-01-08 ASSESSMENT — PAIN DESCRIPTION - DESCRIPTORS: DESCRIPTORS: ACHING;SORE

## 2022-01-08 ASSESSMENT — PAIN SCALES - GENERAL: PAINLEVEL_OUTOF10: 5

## 2022-01-08 ASSESSMENT — PAIN - FUNCTIONAL ASSESSMENT: PAIN_FUNCTIONAL_ASSESSMENT: PREVENTS OR INTERFERES WITH MANY ACTIVE NOT PASSIVE ACTIVITIES

## 2022-01-08 NOTE — ED NOTES
Nasal pharyngeal swab collected for influenza and covid. Pt tolerated well.       Deedee Caruso RN  01/08/22 7215

## 2022-01-08 NOTE — ED PROVIDER NOTES
[facility]  Urgent Care Encounter       CHIEF COMPLAINT       Chief Complaint   Patient presents with    Concern For COVID-19     EXPOSED and sx started wednesday        Nurses Notes reviewed and I agree except as noted in the HPI. HISTORY OF PRESENT ILLNESS   Radha Carrasquillo is a 54 y.o. male who presents to the [facility] urgent care for evaluation of concerns for Covid. He reports that his symptoms started 4 days ago. He reports he was supposed to Covid at work. He reports that his daughter has similar symptoms and had a negative PCR Covid test.  He reports his symptoms as generalized headache, pharyngitis, fever up to 102, generalized body aches, cough, and ear pain. Denies nausea, vomiting, diarrhea. Denies loss of taste or smell. Denies fatigue. The history is provided by the patient. No  was used. REVIEW OF SYSTEMS     Review of Systems   Constitutional: Negative for activity change, appetite change, chills, fatigue and fever. HENT: Positive for ear pain and sore throat. Negative for ear discharge and rhinorrhea. Respiratory: Positive for cough. Negative for chest tightness and shortness of breath. Cardiovascular: Negative for chest pain. Gastrointestinal: Negative for diarrhea, nausea and vomiting. Genitourinary: Negative for dysuria. Musculoskeletal: Positive for myalgias. Skin: Negative for rash. Allergic/Immunologic: Negative for environmental allergies and food allergies. Neurological: Positive for headaches. Negative for dizziness. PAST MEDICAL HISTORY         Diagnosis Date    Atrial fibrillation [facility])     Cerebral artery occlusion with cerebral infarction ([address]     CHF (NYHA class III, ACC/AHA stage C) ([address] 11/22/2016    Episodic headache 11/21/2016    Hyperlipidemia     Hypertension        SURGICALHISTORY     Patient  has a past surgical history that includes Tonsillectomy;  Tonsillectomy; Cardioversion reports current alcohol use. He reports previous drug use. Drug: Marijuana Dolan Meckel). PHYSICAL EXAM     ED TRIAGE VITALS  BP: 126/80, Temp: 97.9 °F (36.6 °C), Pulse: 97 ( \"feel like coughing yesterday sent me into a-fib\"), Resp: 18, SpO2: 98 %,Estimated body mass index is 28.21 kg/m² as calculated from the following:    Height as of this encounter: 5' 9\" (1.753 m). Weight as of this encounter: 191 lb (86.6 kg). ,No LMP for male patient. Physical Exam  Vitals and nursing note reviewed. Constitutional:       General: He is not in acute distress. Appearance: Normal appearance. He is not ill-appearing, toxic-appearing or diaphoretic. HENT:      Head: Normocephalic. Right Ear: Ear canal and external ear normal.      Left Ear: Ear canal and external ear normal.      Nose: Nose normal. No congestion or rhinorrhea. Mouth/Throat:      Mouth: Mucous membranes are moist.      Pharynx: Oropharynx is clear. No oropharyngeal exudate or posterior oropharyngeal erythema. Cardiovascular:      Rate and Rhythm: Normal rate. Pulses: Normal pulses. Pulmonary:      Effort: Pulmonary effort is normal. No respiratory distress. Breath sounds: No stridor. No wheezing or rhonchi. Abdominal:      General: Abdomen is flat. Bowel sounds are normal.      Palpations: Abdomen is soft. Musculoskeletal:         General: No swelling or tenderness. Normal range of motion. Cervical back: Normal range of motion. Neurological:      General: No focal deficit present. Mental Status: He is alert and oriented to person, place, and time.    Psychiatric:         Mood and Affect: Mood normal.         Behavior: Behavior normal.         DIAGNOSTIC RESULTS     Labs:  Results for orders placed or performed during the hospital encounter of 01/08/22   Strep A culture, throat   Result Value Ref Range    REFLEX THROAT C + S INDICATED    STREP A ANTIGEN   Result Value Ref Range    GROUP A STREP CULTURE, REFLEX Negative        IMAGING:    No orders to display         EKG: None      URGENT CARE COURSE:     Vitals:    01/08/22 1351   BP: 126/80   Pulse: 97   Resp: 18   Temp: 97.9 °F (36.6 °C)   TempSrc: Temporal   SpO2: 98%   Weight: 191 lb (86.6 kg)   Height: 5' 9\" (1.753 m)       Medications - No data to display         PROCEDURES:  None    FINAL IMPRESSION      1. Viral URI with cough          DISPOSITION/ PLAN     Patient seen for screening for Covid. Patient is swabbed for Covid and the test sent to the main lab. The patient is notified that the results should be back within 24 hours. He is given information on [employer] to check for his results. He is instructed to isolate until a negative test is obtained. He is instructed to follow-up with his PCP in 3 to 5 days with new or worsening symptoms. He is agreeable with the above plan and denies questions or concerns at this time.     PATIENT REFERRED TO:  [facility] [address] / [facility] [address]      DISCHARGE MEDICATIONS:  Discharge Medication List as of 1/8/2022  2:18 PM      START taking these medications    Details   cetirizine (ZYRTEC) 10 MG tablet Take 1 tablet by mouth daily, Disp-30 tablet, R-0Normal      fluticasone (FLONASE) 50 MCG/ACT nasal spray 1 spray by Each Nostril route daily, Disp-16 g, R-0Normal             Discharge Medication List as of 1/8/2022  2:18 PM          Discharge Medication List as of 1/8/2022  2:18 PM          ISABEL Prieto CNP    (Please note that portions of this note were completed with a voice recognition program. Efforts were made to edit the dictations but occasionally words are mis-transcribed.)           ISABEL Prieto CNP  01/08/22 5424

## 2022-01-08 NOTE — ED NOTES
Throat swabbed for strep, specimen taken to lab. Pt tolerated well.       Ryanne Armendariz RN  01/08/22 4882

## 2022-01-09 LAB
INFLUENZA A: NOT DETECTED
INFLUENZA B: NOT DETECTED
SARS-COV-2 RNA, RT PCR: DETECTED

## 2022-01-10 ENCOUNTER — CARE COORDINATION (OUTPATIENT)
Dept: CARE COORDINATION | Age: 52
End: 2022-01-10

## 2022-01-10 ENCOUNTER — TELEPHONE (OUTPATIENT)
Dept: FAMILY MEDICINE CLINIC | Age: 52
End: 2022-01-10

## 2022-01-10 LAB — THROAT/NOSE CULTURE: NORMAL

## 2022-01-10 NOTE — CARE COORDINATION
[facility] Message left on his voicemail with call back number.

## 2022-01-11 NOTE — CARE COORDINATION
Second attempt to reach patient for Covid ED follow up. Message left on his voicemail with call back number.

## 2022-01-17 ENCOUNTER — OFFICE VISIT (OUTPATIENT)
Dept: FAMILY MEDICINE CLINIC | Age: 52
End: 2022-01-17
Payer: COMMERCIAL

## 2022-01-17 VITALS
SYSTOLIC BLOOD PRESSURE: 114 MMHG | DIASTOLIC BLOOD PRESSURE: 80 MMHG | RESPIRATION RATE: 16 BRPM | WEIGHT: 211.1 LBS | TEMPERATURE: 97.8 F | BODY MASS INDEX: 31.17 KG/M2 | HEART RATE: 72 BPM

## 2022-01-17 DIAGNOSIS — I10 HYPERTENSION, UNSPECIFIED TYPE: Primary | ICD-10-CM

## 2022-01-17 DIAGNOSIS — F41.9 ANXIETY: ICD-10-CM

## 2022-01-17 DIAGNOSIS — I48.91 ATRIAL FIBRILLATION, UNSPECIFIED TYPE (HCC): ICD-10-CM

## 2022-01-17 DIAGNOSIS — Z86.16 HISTORY OF COVID-19: ICD-10-CM

## 2022-01-17 PROCEDURE — 99214 OFFICE O/P EST MOD 30 MIN: CPT | Performed by: FAMILY MEDICINE

## 2022-01-17 RX ORDER — HYDROXYZINE PAMOATE 25 MG/1
25 CAPSULE ORAL 3 TIMES DAILY PRN
Qty: 60 CAPSULE | Refills: 2 | Status: SHIPPED | OUTPATIENT
Start: 2022-01-17

## 2022-01-17 RX ORDER — LOSARTAN POTASSIUM 25 MG/1
25 TABLET ORAL DAILY
Qty: 90 TABLET | Refills: 3 | Status: SHIPPED | OUTPATIENT
Start: 2022-01-17 | End: 2022-05-24 | Stop reason: ALTCHOICE

## 2022-01-17 SDOH — ECONOMIC STABILITY: FOOD INSECURITY: WITHIN THE PAST 12 MONTHS, THE FOOD YOU BOUGHT JUST DIDN'T LAST AND YOU DIDN'T HAVE MONEY TO GET MORE.: PATIENT DECLINED

## 2022-01-17 SDOH — ECONOMIC STABILITY: FOOD INSECURITY: WITHIN THE PAST 12 MONTHS, YOU WORRIED THAT YOUR FOOD WOULD RUN OUT BEFORE YOU GOT MONEY TO BUY MORE.: PATIENT DECLINED

## 2022-01-17 ASSESSMENT — PATIENT HEALTH QUESTIONNAIRE - PHQ9
SUM OF ALL RESPONSES TO PHQ QUESTIONS 1-9: 0
1. LITTLE INTEREST OR PLEASURE IN DOING THINGS: 0
SUM OF ALL RESPONSES TO PHQ QUESTIONS 1-9: 0
2. FEELING DOWN, DEPRESSED OR HOPELESS: 0
SUM OF ALL RESPONSES TO PHQ9 QUESTIONS 1 & 2: 0
SUM OF ALL RESPONSES TO PHQ QUESTIONS 1-9: 0
SUM OF ALL RESPONSES TO PHQ QUESTIONS 1-9: 0

## 2022-01-17 ASSESSMENT — ENCOUNTER SYMPTOMS
RESPIRATORY NEGATIVE: 1
GASTROINTESTINAL NEGATIVE: 1

## 2022-01-17 ASSESSMENT — SOCIAL DETERMINANTS OF HEALTH (SDOH): HOW HARD IS IT FOR YOU TO PAY FOR THE VERY BASICS LIKE FOOD, HOUSING, MEDICAL CARE, AND HEATING?: PATIENT DECLINED

## 2022-01-17 NOTE — PROGRESS NOTES
[address] (:  1970) is a 54 y.o. male,Established patient, here for evaluation of the following chief complaint(s):  Hypertension (194/102, 134/95)        Subjective   SUBJECTIVE/OBJECTIVE:  HPI:    Chief Complaint   Patient presents with    Hypertension     194/102, 134/95     Pt here for elevated BPs and ED follow up. CHIEF COMPLAINT             Chief Complaint   Patient presents with    Concern For COVID-19       EXPOSED and sx started wednesday          Nurses Notes reviewed and I agree except as noted in the HPI. HISTORY OF PRESENT ILLNESS   [address] Garcia is a 54 y.o. male who presents to the [facility] urgent care for evaluation of concerns for Covid. He reports that his symptoms started 4 days ago. He reports he was supposed to Covid at work. He reports that his daughter has similar symptoms and had a negative PCR Covid test.  He reports his symptoms as generalized headache, pharyngitis, fever up to 102, generalized body aches, cough, and ear pain. Denies nausea, vomiting, diarrhea. Denies loss of taste or smell. Denies fatigue.     BP looks fine today. BP Readings from Last 3 Encounters:   22 114/80   22 126/80   21 103/67     Has follow up appt with Dr. Milka Shanks on Thursday[facility]     Patient Active Problem List   Diagnosis    Persistent atrial fibrillation (HCC)    Episodic headache    Cerebrovascular accident (CVA) due to embolism of left middle cerebral artery ([address]    CHF (NYHA class III, ACC/AHA stage C) ([address]    MR (mitral regurgitation)    Atrial fibrillation ([address]    Abdominal pain, right lower quadrant    S/P appendectomy    PAF (paroxysmal atrial fibrillation) ([address] S/P pvi 2020 AND NOW RECURRENCE 21 WITH cvr    History of CVA (cerebrovascular accident)    Primary hypertension    Encounter for cardioversion procedure     Past Surgical History:   Procedure Laterality Date    ABLATION OF DYSRHYTHMIC FOCUS cyroablation    CARDIOVERSION  2019    LAPAROSCOPIC APPENDECTOMY N/A 2020    APPENDECTOMY LAPAROSCOPIC converted to open performed by Sandi Aguilar MD at [address] [facility]       Social History     Tobacco Use    Smoking status: Former Smoker     Packs/day: 0.50     Years: 4.00     Pack years: 2.00     Quit date:      Years since quittin.0    Smokeless tobacco: Never Used   Vaping Use    Vaping Use: Never used   Substance Use Topics    Alcohol use: Yes     Comment: SOCIALLY    Drug use: Not Currently     Types: Marijuana Jonoavtar Sanchez)     Comment: hx of          Review of Systems   Constitutional: Negative. HENT: Negative. Respiratory: Negative. Cardiovascular: Negative. Gastrointestinal: Negative. Musculoskeletal: Negative. All other systems reviewed and are negative. Objective   Physical Exam  Vitals and nursing note reviewed. Constitutional:       General: He is not in acute distress. Appearance: Normal appearance. He is well-developed. HENT:      Head: Normocephalic and atraumatic. Right Ear: Tympanic membrane normal.      Left Ear: Tympanic membrane normal.   Eyes:      Conjunctiva/sclera: Conjunctivae normal.   Cardiovascular:      Rate and Rhythm: Normal rate. Rhythm irregularly irregular. Heart sounds: Normal heart sounds. No murmur heard. Pulmonary:      Effort: Pulmonary effort is normal.      Breath sounds: Normal breath sounds. No wheezing, rhonchi or rales. Abdominal:      General: There is no distension. Musculoskeletal:      Cervical back: Neck supple. Skin:     General: Skin is warm and dry. Findings: No rash (on exposed surfaces). Neurological:      General: No focal deficit present. Mental Status: He is alert.    Psychiatric:         Attention and Perception: Attention normal.         Mood and Affect: Mood normal.         Speech: Speech normal.         Behavior: Behavior normal. Behavior is cooperative. Thought Content: Thought content normal.         Judgment: Judgment normal.               ASSESSMENT/PLAN:  1. Hypertension, unspecified type  -     losartan (COZAAR) 25 MG tablet; Take 1 tablet by mouth daily, Disp-90 tablet, R-3Normal  2. Atrial fibrillation, unspecified type (HCC)  -     apixaban (ELIQUIS) 5 MG TABS tablet; Take 1 tablet by mouth 2 times daily, Disp-60 tablet, R-5Normal  3. History of COVID-19  4. Anxiety  -     hydrOXYzine (VISTARIL) 25 MG capsule; Take 1 capsule by mouth 3 times daily as needed for Anxiety, Disp-60 capsule, R-2Normal    -  ED reports reviewed  -  Chronic medical problems stable  -  Continue current medications  -  Follow up with specialists as scheduled, sees Dr. Juan Eid on Thursday    Return if symptoms worsen or fail to improve. An electronic signature was used to authenticate this note.     --[place] Cl[place], DO

## 2022-01-18 ENCOUNTER — TELEPHONE (OUTPATIENT)
Dept: FAMILY MEDICINE CLINIC | Age: 52
End: 2022-01-18

## 2022-01-18 NOTE — TELEPHONE ENCOUNTER
PA request received from pharmacy for Eliquis 5mg #60 for 30 days. PA submitted online at [email or web address] and pending review.

## 2022-01-19 NOTE — TELEPHONE ENCOUNTER
Your PA request has been denied. Additional information will be provided in the denial communication. (Message 1140)    See denial letter scanned in Media.

## 2022-01-19 NOTE — TELEPHONE ENCOUNTER
Notify pt, Eliquis denied by insurance. He has appt with Dr. Shan Gallo tomorrow, recommend discussing this with him to see if samples are available or trial different med.

## 2022-05-24 ENCOUNTER — OFFICE VISIT (OUTPATIENT)
Dept: CARDIOLOGY CLINIC | Age: 52
End: 2022-05-24
Payer: COMMERCIAL

## 2022-05-24 VITALS
HEIGHT: 69 IN | HEART RATE: 82 BPM | RESPIRATION RATE: 16 BRPM | SYSTOLIC BLOOD PRESSURE: 128 MMHG | DIASTOLIC BLOOD PRESSURE: 80 MMHG | BODY MASS INDEX: 32.05 KG/M2 | WEIGHT: 216.4 LBS

## 2022-05-24 DIAGNOSIS — G45.3 AF (AMAUROSIS FUGAX): ICD-10-CM

## 2022-05-24 DIAGNOSIS — I48.0 PAF (PAROXYSMAL ATRIAL FIBRILLATION) (HCC): Primary | ICD-10-CM

## 2022-05-24 PROCEDURE — 93000 ELECTROCARDIOGRAM COMPLETE: CPT | Performed by: INTERNAL MEDICINE

## 2022-05-24 PROCEDURE — 99213 OFFICE O/P EST LOW 20 MIN: CPT | Performed by: INTERNAL MEDICINE

## 2022-05-24 RX ORDER — DIGOXIN 125 MCG
125 TABLET ORAL DAILY
COMMUNITY
End: 2022-05-24 | Stop reason: SDUPTHER

## 2022-05-24 RX ORDER — DILTIAZEM HYDROCHLORIDE 60 MG/1
60 TABLET, FILM COATED ORAL 2 TIMES DAILY
Qty: 180 TABLET | Refills: 3 | Status: SHIPPED | OUTPATIENT
Start: 2022-05-24 | End: 2022-07-12 | Stop reason: SDUPTHER

## 2022-05-24 RX ORDER — POTASSIUM CHLORIDE 20 MEQ/1
20 TABLET, EXTENDED RELEASE ORAL DAILY
Qty: 90 TABLET | Refills: 3 | Status: SHIPPED | OUTPATIENT
Start: 2022-05-24 | End: 2022-07-12 | Stop reason: SDUPTHER

## 2022-05-24 RX ORDER — DIGOXIN 125 MCG
125 TABLET ORAL DAILY
Qty: 90 TABLET | Refills: 3 | Status: SHIPPED | OUTPATIENT
Start: 2022-05-24

## 2022-05-24 ASSESSMENT — ENCOUNTER SYMPTOMS
COUGH: 0
APNEA: 0
VOICE CHANGE: 0
ANAL BLEEDING: 0
TROUBLE SWALLOWING: 0
CHOKING: 0
VOMITING: 0
WHEEZING: 0
ABDOMINAL PAIN: 0
ABDOMINAL DISTENTION: 0
BLOOD IN STOOL: 0
SHORTNESS OF BREATH: 0
STRIDOR: 0
COLOR CHANGE: 0
NAUSEA: 0
CHEST TIGHTNESS: 0

## 2022-05-24 NOTE — PROGRESS NOTES
[address] [facility]  [place] [address]  Dept: [phone]  Loc: [phone]     5/24/2022       Pebbles Tapia is here today for   Chief Complaint   Patient presents with    Atrial Fibrillation           Referring Physician:  No ref. provider found     Patient Active Problem List   Diagnosis    Persistent atrial fibrillation (HCC)    Episodic headache    Cerebrovascular accident (CVA) due to embolism of left middle cerebral artery ([address]    CHF (NYHA class III, ACC/AHA stage C) ([address]    MR (mitral regurgitation)    Atrial fibrillation ([address]    Abdominal pain, right lower quadrant    S/P appendectomy    PAF (paroxysmal atrial fibrillation) [address] S/P pvi 03/2020 AND NOW RECURRENCE 11/22/21 WITH cvr    History of CVA (cerebrovascular accident)    Primary hypertension    Encounter for cardioversion procedure       Review of Systems   Constitutional: Negative for activity change, appetite change, fatigue, fever and unexpected weight change. HENT: Negative for congestion, trouble swallowing and voice change. Eyes: Negative for visual disturbance. Respiratory: Negative for apnea, cough, choking, chest tightness, shortness of breath, wheezing and stridor. Cardiovascular: Positive for chest pain and palpitations. Negative for leg swelling. Gastrointestinal: Negative for abdominal distention, abdominal pain, anal bleeding, blood in stool, nausea and vomiting. Endocrine: Negative for cold intolerance and heat intolerance. Genitourinary: Negative for hematuria. Musculoskeletal: Negative for arthralgias, gait problem, joint swelling and myalgias. Skin: Negative for color change and rash. Allergic/Immunologic: Negative for environmental allergies and food allergies. Neurological: Negative for dizziness, tremors, syncope, facial asymmetry, weakness, light-headedness, numbness and headaches. Hematological: Does not bruise/bleed easily. Psychiatric/Behavioral: Negative for agitation, behavioral problems and sleep disturbance. Past Medical History:   Diagnosis Date    At[facility] fibrillation [facility]     Cerebral artery occlusion with cerebral infarction ([facility]     CHF (NYHA class III, ACC/AHA stage C) ([facility]) 2016    Episodic headache 2016    Hyperlipidemia     Hypertension        Allergies   Allergen Reactions    Albuterol      Per patient raises my blood pressure       Current Outpatient Medications   Medication Sig Dispense Refill    dilTIAZem (CARDIZEM) 60 MG tablet Take 1 tablet by mouth 2 times daily 180 tablet 3    digoxin (LANOXIN) 125 MCG tablet Take 1 tablet by mouth daily 90 tablet 3    rivaroxaban (XARELTO) 20 MG TABS tablet Take 1 tablet by mouth daily (with breakfast) 90 tablet 3    potassium chloride (KLOR-CON M) 20 MEQ extended release tablet Take 1 tablet by mouth daily 90 tablet 3    hydrOXYzine (VISTARIL) 25 MG capsule Take 1 capsule by mouth 3 times daily as needed for Anxiety 60 capsule 2    acetaminophen (TYLENOL) 500 MG tablet Take 500 mg by mouth every 6 hours as needed for Pain      aspirin 81 MG chewable tablet Take 1 tablet by mouth daily 30 tablet 3    bumetanide (BUMEX) 2 MG tablet Take 1 tablet by mouth daily (Patient taking differently: Take 2 mg by mouth daily as needed ) 30 tablet 3     No current facility-administered medications for this visit. Social History     Socioeconomic History    Marital status:      Spouse name: Nay    Number of children: 10    Years of education: None    Highest education level: None   Occupational History    None   Tobacco Use    Smoking status: Former Smoker     Packs/day: 0.50     Years: 4.00     Pack years: 2.00     Quit date:      Years since quittin.4    Smokeless tobacco: Never Used   Vaping Use    Vaping Use: Never used   Substance and Sexual Activity    Alcohol use:  Yes Comment: SOCIALLY    Drug use: Not Currently     Types: Marijuana Jose Divine)     Comment: hx of     Sexual activity: Yes     Partners: Female   Other Topics Concern    None   Social History Narrative    None     Social Determinants of Health     Financial Resource Strain: Unknown    Difficulty of Paying Living Expenses: Patient refused   Food Insecurity: Unknown    Worried About Running Out of Food in the Last Year: Patient refused    [address] in the Last Year: Patient refused   Transportation Needs:     Lack of Transportation (Medical): Not on file    Lack of Transportation (Non-Medical): Not on file   Physical Activity:     Days of Exercise per Week: Not on file    Minutes of Exercise per Session: Not on file   Stress:     Feeling of Stress : Not on file   Social Connections:     Frequency of Communication with Friends and Family: Not on file    Frequency of Social Gatherings with Friends and Family: Not on file    Attends Orthodox Services: Not on file    Active Member of [address] [place] [address] or Organizations: Not on file    Attends Club or Organization Meetings: Not on file    Marital Status: Not on file   Intimate Partner Violence:     Fear of Current or Ex-Partner: Not on file    Emotionally Abused: Not on file    Physically Abused: Not on file    Sexually Abused: Not on file   Housing Stability:     Unable to Pay for Housing in the Last Year: Not on file    Number of [place] in the Last Year: Not on file    Unstable Housing in the Last Year: Not on file       Family History   Problem Relation Age of Onset    Heart Disease Father     Cancer Maternal Aunt     Heart Failure Maternal Grandmother     Heart Disease Mother         atrial fib    No Known Problems Brother     No Known Problems Brother     No Known Problems Brother     No Known Problems Brother     Diabetes Paternal Aunt        Blood pressure 128/80, pulse 82, resp.  rate 16, height 5' 9\" (1.753 m), weight 216 lb 6.4 oz (98.2 07/25/2019    LDLCALC 70 07/25/2019    LDLDIRECT 150 01/19/2017       TSH:    Lab Results   Component Value Date    TSH 1.250 11/22/2021        Diagnosis Orders   1. PAF (paroxysmal atrial fibrillation) (HCC)  94825 - [facility] ELECTROC[facility]IO[facility], COMPLETE    External Referral To Cardiac Electrophysiology    Cardioversion external   2. AF (amaurosis fugax)  External Referral To Cardiac Electrophysiology    Cardioversion external        Assessment/Plan    Wayne Meek is a 54years old gentleman history of atrial fibrillation history of cardioversion and ablation treatment few years ago and is here for a follow-up with recurrence of his atrial A. fib he has been on Xarelto he admits to excessive alcohol consumption over the weekends. The patient has atypical chest pain history of nonobstructive coronary artery disease    Discussed with him different options of treatment patient will go for the cardioversion and he also be considered for the ablation treatment he will be seeing Dr. Tor Eastman he or Dr. Ailin Barba for for this procedure patient will continue with the current medication continue with Xarelto the patient seek medical attention had any change in clinical condition he does have a history of hypercholesterolemia has mild obesity was the result of the cardioversion further recommendation will be made    Orders Placed This Encounter   Procedures    External Referral To Cardiac Electrophysiology     Referral Priority:   Routine     Referral Type:   Eval and Treat     Referral Reason:   Specialty Services Required     Requested Specialty:   Cardiac Electrophysiology     Number of Visits Requested:   1    Cardioversion external     Standing Status:   Future     Standing Expiration Date:   5/24/2023    86981 - [facility] ELECT[facility], COMPLETE       No follow-ups on file.      Milady Marx MD

## 2022-05-25 ENCOUNTER — PRE-PROCEDURE TELEPHONE (OUTPATIENT)
Dept: INPATIENT UNIT | Age: 52
End: 2022-05-25

## 2022-05-25 NOTE — PROGRESS NOTES
NPO after midnight  Bring drivers license and insurance information  Wear comfortable clean clothes  Shower morning of and night before with liquid antibacterial soap  Remove jewelry   May have to stay overnight if have PTCA/stent  Bring medications in original bottles  Made aware of visitors limit to 2 at a time  Follow all instructions given by your physician  Please notify doctor office if you need to cancel or reschedule your procedure   needed at discharge

## 2022-05-27 ENCOUNTER — HOSPITAL ENCOUNTER (OUTPATIENT)
Dept: INPATIENT UNIT | Age: 52
Discharge: HOME | End: 2022-05-27
Attending: INTERNAL MEDICINE | Admitting: INTERNAL MEDICINE
Payer: COMMERCIAL

## 2022-05-27 VITALS
HEART RATE: 69 BPM | SYSTOLIC BLOOD PRESSURE: 117 MMHG | DIASTOLIC BLOOD PRESSURE: 73 MMHG | BODY MASS INDEX: 31.55 KG/M2 | WEIGHT: 213 LBS | RESPIRATION RATE: 14 BRPM | HEIGHT: 69 IN | OXYGEN SATURATION: 98 % | TEMPERATURE: 97.5 F

## 2022-05-27 LAB
ALBUMIN SERPL-MCNC: 4.6 G/DL (ref 3.5–5.1)
ALP BLD-CCNC: 62 U/L (ref 38–126)
ALT SERPL-CCNC: 15 U/L (ref 11–66)
ANION GAP SERPL CALCULATED.3IONS-SCNC: 10 MEQ/L (ref 8–16)
AST SERPL-CCNC: 18 U/L (ref 5–40)
BILIRUB SERPL-MCNC: 0.6 MG/DL (ref 0.3–1.2)
BUN BLDV-MCNC: 17 MG/DL (ref 7–22)
CALCIUM SERPL-MCNC: 8.9 MG/DL (ref 8.5–10.5)
CHLORIDE BLD-SCNC: 104 MEQ/L (ref 98–111)
CO2: 24 MEQ/L (ref 23–33)
CREAT SERPL-MCNC: 0.9 MG/DL (ref 0.4–1.2)
EKG ATRIAL RATE: 73 BPM
EKG P AXIS: 48 DEGREES
EKG P-R INTERVAL: 174 MS
EKG Q-T INTERVAL: 360 MS
EKG Q-T INTERVAL: 416 MS
EKG QRS DURATION: 76 MS
EKG QRS DURATION: 78 MS
EKG QTC CALCULATION (BAZETT): 445 MS
EKG QTC CALCULATION (BAZETT): 458 MS
EKG R AXIS: -20 DEGREES
EKG R AXIS: -21 DEGREES
EKG T AXIS: 5 DEGREES
EKG T AXIS: 9 DEGREES
EKG VENTRICULAR RATE: 73 BPM
EKG VENTRICULAR RATE: 92 BPM
ERYTHROCYTE [DISTWIDTH] IN BLOOD BY AUTOMATED COUNT: 12.9 % (ref 11.5–14.5)
ERYTHROCYTE [DISTWIDTH] IN BLOOD BY AUTOMATED COUNT: 43.3 FL (ref 35–45)
GFR SERPL CREATININE-BSD FRML MDRD: > 90 ML/MIN/1.73M2
GLUCOSE BLD-MCNC: 100 MG/DL (ref 70–108)
HCT VFR BLD CALC: 46.6 % (ref 42–52)
HEMOGLOBIN: 15.9 GM/DL (ref 14–18)
INR BLD: 1.68 (ref 0.85–1.13)
MCH RBC QN AUTO: 31.2 PG (ref 26–33)
MCHC RBC AUTO-ENTMCNC: 34.1 GM/DL (ref 32.2–35.5)
MCV RBC AUTO: 91.4 FL (ref 80–94)
PLATELET # BLD: 193 THOU/MM3 (ref 130–400)
PMV BLD AUTO: 10.4 FL (ref 9.4–12.4)
POTASSIUM SERPL-SCNC: 4.3 MEQ/L (ref 3.5–5.2)
RBC # BLD: 5.1 MILL/MM3 (ref 4.7–6.1)
SODIUM BLD-SCNC: 138 MEQ/L (ref 135–145)
TOTAL PROTEIN: 6.8 G/DL (ref 6.1–8)
WBC # BLD: 5.6 THOU/MM3 (ref 4.8–10.8)

## 2022-05-27 PROCEDURE — 85027 COMPLETE CBC AUTOMATED: CPT

## 2022-05-27 PROCEDURE — 99152 MOD SED SAME PHYS/QHP 5/>YRS: CPT

## 2022-05-27 PROCEDURE — 93005 ELECTROCARDIOGRAM TRACING: CPT | Performed by: INTERNAL MEDICINE

## 2022-05-27 PROCEDURE — 85610 PROTHROMBIN TIME: CPT

## 2022-05-27 PROCEDURE — 2500000003 HC RX 250 WO HCPCS

## 2022-05-27 PROCEDURE — 92960 CARDIOVERSION ELECTRIC EXT: CPT | Performed by: INTERNAL MEDICINE

## 2022-05-27 PROCEDURE — 93010 ELECTROCARDIOGRAM REPORT: CPT | Performed by: INTERNAL MEDICINE

## 2022-05-27 PROCEDURE — 99024 POSTOP FOLLOW-UP VISIT: CPT | Performed by: INTERNAL MEDICINE

## 2022-05-27 PROCEDURE — 92960 CARDIOVERSION ELECTRIC EXT: CPT

## 2022-05-27 PROCEDURE — 2500000003 HC RX 250 WO HCPCS: Performed by: INTERNAL MEDICINE

## 2022-05-27 PROCEDURE — 2580000003 HC RX 258: Performed by: INTERNAL MEDICINE

## 2022-05-27 PROCEDURE — 6360000002 HC RX W HCPCS: Performed by: INTERNAL MEDICINE

## 2022-05-27 PROCEDURE — 80053 COMPREHEN METABOLIC PANEL: CPT

## 2022-05-27 PROCEDURE — 6360000002 HC RX W HCPCS

## 2022-05-27 PROCEDURE — 36415 COLL VENOUS BLD VENIPUNCTURE: CPT

## 2022-05-27 RX ORDER — FENTANYL CITRATE 50 UG/ML
INJECTION, SOLUTION INTRAMUSCULAR; INTRAVENOUS
Status: COMPLETED | OUTPATIENT
Start: 2022-05-27 | End: 2022-05-27

## 2022-05-27 RX ORDER — MIDAZOLAM HYDROCHLORIDE 1 MG/ML
INJECTION INTRAMUSCULAR; INTRAVENOUS
Status: COMPLETED | OUTPATIENT
Start: 2022-05-27 | End: 2022-05-27

## 2022-05-27 RX ORDER — NALOXONE HYDROCHLORIDE 0.4 MG/ML
INJECTION, SOLUTION INTRAMUSCULAR; INTRAVENOUS; SUBCUTANEOUS
Status: COMPLETED | OUTPATIENT
Start: 2022-05-27 | End: 2022-05-27

## 2022-05-27 RX ORDER — FLUMAZENIL 0.1 MG/ML
INJECTION, SOLUTION INTRAVENOUS
Status: COMPLETED | OUTPATIENT
Start: 2022-05-27 | End: 2022-05-27

## 2022-05-27 RX ORDER — SODIUM CHLORIDE 9 MG/ML
INJECTION, SOLUTION INTRAVENOUS CONTINUOUS
Status: DISCONTINUED | OUTPATIENT
Start: 2022-05-27 | End: 2022-05-27 | Stop reason: HOSPADM

## 2022-05-27 RX ADMIN — FENTANYL CITRATE 100 MCG: 50 INJECTION, SOLUTION INTRAMUSCULAR; INTRAVENOUS at 08:08

## 2022-05-27 RX ADMIN — FLUMAZENIL 0.4 MG: 0.1 INJECTION INTRAVENOUS at 08:10

## 2022-05-27 RX ADMIN — FENTANYL CITRATE 25 MCG: 50 INJECTION, SOLUTION INTRAMUSCULAR; INTRAVENOUS at 08:09

## 2022-05-27 RX ADMIN — NALOXONE HYDROCHLORIDE 0.4 MG: 0.4 INJECTION, SOLUTION INTRAMUSCULAR; INTRAVENOUS; SUBCUTANEOUS at 08:10

## 2022-05-27 RX ADMIN — MIDAZOLAM 1 MG: 1 INJECTION INTRAMUSCULAR; INTRAVENOUS at 08:09

## 2022-05-27 RX ADMIN — SODIUM CHLORIDE: 9 INJECTION, SOLUTION INTRAVENOUS at 07:36

## 2022-05-27 RX ADMIN — MIDAZOLAM 5 MG: 1 INJECTION INTRAMUSCULAR; INTRAVENOUS at 08:07

## 2022-05-27 NOTE — PROCEDURES
[address] [place] [address]                            CARDIAC CATHETERIZATION    PATIENT NAME: Claritza Lees                     :        1970  MED REC NO:   [id number]                           ROOM:       0005  ACCOUNT NO:   [de-identified]                           ADMIT DATE: 2022  PROVIDER:     Toyin Roman. Tj Hamlin M.D.    Kinga Picket:  2022    INDICATION FOR PROCEDURE:  This is a 53-year-old gentleman with history  of atrial fibrillation, ablation treatment; recurrent atrial  fibrillation. He has been anticoagulated. He has been treated with  antiarrhythmic medication, continued to be in atrial fib, symptomatic;  admitted for cardioversion. The patient understands the procedure, the  benefit, the risk, the alternative methods of treatment, the possible  complications, and agrees to have it done. PROCEDURES PERFORMED:  1.  IV conscious sedation:  The patient was given IV conscious sedation  in incremental dosage by the circulating cath lab RN, monitored by the  cath lab monitor tech under my supervision. The procedure started at  08:00 a.m. and finished at 08:20. No acute complication from the  procedure. 2.  Cardioversion:  After obtaining informed consent, the patient was  heavily sedated. Hemodynamically, he was monitored. Oxygen saturation  was about 95%. The patient was then cardioverted utilizing synchronized  current 360 joules x1. He converted to a sinus rhythm. He was then given the Narcan and Romazicon to expedite the recovery. He  has no focal neurological deficits. EKG was obtained, showed sinus rhythm. SUMMARY:  Successful electrical cardioversion under IV conscious  sedation. RECOMMENDATION:  Continue antiarrhythmic medication. Continue  anticoagulation. Follow up in the office. Seek medical attention if he  has any change in clinical condition.         Alvina Dubin, M.D.    D: 05/27/2022 8:32:51       T: 05/27/2022 8:56:24     [email or web address]  Job#: [id number]     Doc#: [id number]    CC:

## 2022-05-27 NOTE — H&P
[address] [address]   Sedation/Analgesia History and Physical    Pt Name: Jaqui Wilburn  MRN: [id number]  YOB: 1970  Provider Performing Procedure: Buffy Poole MD, MD  Primary Care Physician: Patricia Ordoñez DO      Pre-Procedure: {CARDIAC NNDADUEU:[id number]: symptomatic atrial fib  Consent: I have discussed with the patient and/or the patient representative the indication, alternatives, and the possible risks and/or complications of the planned procedure and the anesthesia methods. The patient and/or representative appear to understand and agree to proceed. Medical History:   has a past medical history of Atrial fibrillation ([facility] Cerebral artery occlusion with cerebral infarction ([facility] CHF (NYHA class III, ACC/AHA stage C) ([facility] Episodic headache, Hyperlipidemia, and Hypertension. .    Surgical History:     has a past surgical history that includes Tonsillectomy; Tonsillectomy; Cardioversion (2019); laparoscopic appendectomy (N/A, 5/31/2020); and ablation of dysrhythmic focus. Allergies: Allergies as of 05/27/2022 - Fully Reviewed 05/27/2022   Allergen Reaction Noted    Albuterol  05/31/2020       Medications:   Coumadin use last 5 days:  No  Antiplatelet drug therapy use last 5 days:  Yes  Other anticoagulant use last 5 days:  No    Prior to Admission medications    Medication Sig Start Date End Date Taking?  Authorizing Provider   dilTIAZem (CARDIZEM) 60 MG tablet Take 1 tablet by mouth 2 times daily 5/24/22   Buffy Poole MD   digoxin (LANOXIN) 125 MCG tablet Take 1 tablet by mouth daily 5/24/22   Ash Corey MD   rivaroxaban (XARELTO) 20 MG TABS tablet Take 1 tablet by mouth daily (with breakfast) 5/24/22   Ash Corey MD   potassium chloride (KLOR-CON M) 20 MEQ extended release tablet Take 1 tablet by mouth daily 5/24/22   Ash Corey MD   hydrOXYzine (VISTARIL) 25 MG capsule Take 1 capsule by mouth 3 times daily as needed for Anxiety 1/17/22   Amanda Laboy Marino Kennedy DO   acetaminophen (TYLENOL) 500 MG tablet Take 500 mg by mouth every 6 hours as needed for Pain    Historical Provider, MD   aspirin 81 MG chewable tablet Take 1 tablet by mouth daily 11/22/16   Ash Hogan MD   bumetanide (BUMEX) 2 MG tablet Take 1 tablet by mouth daily  Patient taking differently: Take 2 mg by mouth daily as needed  11/22/16   Georga Cranker, MD       Vital Signs  Vitals:    05/27/22 0644   BP: 124/88   Pulse: 73   Resp: 12   Temp: 97.5 °F (36.4 °C)   SpO2: 98%       Physical:  Heart:  regular rate and rhythm  Lungs:  Clear  Abdomen:  Soft  Mental Status:  Alert and Oriented    Planned Procedure:  {[facility] CATH LAB PROCEDURES:20126[de-identified] cardioversion  Sedation/ Anesthesia Plan: Midazolam and Sublimaze    ASA Classification: Class 3 - A patient with severe systemic disease that limits activity but is not incapacitating    Mallampati Airway Classification: II (soft palate, uvula, fauces visible)    · Pre-procedure diagnostic studies complete and results available. · Previous sedation/anesthesia experiences assessed. · The patient is an appropriate candidate to undergo the planned procedure sedation and anesthesia. (Refer to nursing sedation/analgesia documentation record)  · Formulation and discussion of sedation/procedure plan, risks, and expectations with patient and/or responsible adult completed. · Patient examined immediately prior to the procedure.  (Refer to nursing sedation/analgesia documentation record)    Georga Cranker, MD, MD  Electronically signed 5/27/2022 at 7:13 AM  Patient Name: Shani Serrano Record Number: [id number]  Date: 5/27/2022   Time: 7:13 AM   Room/Bed: [facility]05/005-A

## 2022-05-27 NOTE — OP NOTE
Operative Note      Patient: [address] [facility]  Sedation/Analgesia Post Sedation Record      Pt Name: Devin Smith  MRN: [id number]  YOB: 1970  Procedure Performed By: Frida Alston MD, MD  Primary Care Physician: Ines Hull DO    POST-PROCEDURE    Physician: Frida Alston MD, MD    Procedure Performed:  Cardioversion    Sedation/Anesthesia:IV Conscious Sedation with continuous O2 monitoring    Estimated Blood Loss:  none    Specimens Removed:  None    Complications:  None     Post Procedure Diagnosis/Findings:  Atrial Fibrillation    Recommendations:  Anya Mccray MD, MD  Electronically signed 5/27/2022 at 8:29 AM

## 2022-05-27 NOTE — PROGRESS NOTES
Discharge teaching and instructions completed with patient using teachback method. AVS reviewed. No new prescriptions. Patient voiced understanding regarding medications, follow up appointments, and care of self at home. 1215 Discharged in a wheelchair to home with support per wife.

## 2022-05-31 ENCOUNTER — TELEPHONE (OUTPATIENT)
Dept: FAMILY MEDICINE CLINIC | Age: 52
End: 2022-05-31

## 2022-07-11 DIAGNOSIS — I48.0 PAROXYSMAL ATRIAL FIBRILLATION (HCC): Primary | ICD-10-CM

## 2022-07-12 ENCOUNTER — OFFICE VISIT (OUTPATIENT)
Dept: CARDIOLOGY CLINIC | Age: 52
End: 2022-07-12
Payer: COMMERCIAL

## 2022-07-12 VITALS
RESPIRATION RATE: 12 BRPM | DIASTOLIC BLOOD PRESSURE: 78 MMHG | WEIGHT: 214.4 LBS | HEIGHT: 69 IN | BODY MASS INDEX: 31.76 KG/M2 | SYSTOLIC BLOOD PRESSURE: 132 MMHG | HEART RATE: 84 BPM

## 2022-07-12 DIAGNOSIS — I10 PRIMARY HYPERTENSION: Primary | ICD-10-CM

## 2022-07-12 PROCEDURE — 93000 ELECTROCARDIOGRAM COMPLETE: CPT | Performed by: INTERNAL MEDICINE

## 2022-07-12 PROCEDURE — 99213 OFFICE O/P EST LOW 20 MIN: CPT | Performed by: INTERNAL MEDICINE

## 2022-07-12 RX ORDER — DILTIAZEM HYDROCHLORIDE 60 MG/1
60 TABLET, FILM COATED ORAL 2 TIMES DAILY
Qty: 180 TABLET | Refills: 3 | Status: SHIPPED | OUTPATIENT
Start: 2022-07-12

## 2022-07-12 RX ORDER — POTASSIUM CHLORIDE 20 MEQ/1
20 TABLET, EXTENDED RELEASE ORAL DAILY
Qty: 90 TABLET | Refills: 3 | Status: SHIPPED | OUTPATIENT
Start: 2022-07-12

## 2022-07-12 ASSESSMENT — ENCOUNTER SYMPTOMS
WHEEZING: 0
ABDOMINAL PAIN: 0
CHEST TIGHTNESS: 0
ABDOMINAL DISTENTION: 0
ANAL BLEEDING: 0
VOMITING: 0
TROUBLE SWALLOWING: 0
NAUSEA: 0
COLOR CHANGE: 0
BLOOD IN STOOL: 0
APNEA: 0
STRIDOR: 0
SHORTNESS OF BREATH: 0
COUGH: 0
VOICE CHANGE: 0
CHOKING: 0

## 2022-07-12 NOTE — PROGRESS NOTES
[address] [facility]  [address] [facility],4Th Floor  Dept: [phone]  Loc: [phone]     7/12/2022       Dank Thornton is here today for   Chief Complaint   Patient presents with   Danna Martins Follow-up           Referring Physician:  No ref. provider found     Patient Active Problem List   Diagnosis    Persistent atrial fibrillation (HCC)    Episodic headache    Cerebrovascular accident (CVA) due to embolism of left middle cerebral artery ([address]    CHF (NYHA class III, ACC/AHA stage C) ([address]    MR (mitral regurgitation)    Atrial fibrillation ([address]    Abdominal pain, right lower quadrant    S/P appendectomy    PAF (paroxysmal atrial fibrillation) ([facility] S/P pvi 03/2020 AND NOW RECURRENCE 11/22/21 WITH cvr    History of CVA (cerebrovascular accident)    Primary hypertension    Encounter for cardioversion procedure       Review of Systems   Constitutional: Negative for activity change, appetite change, fatigue, fever and unexpected weight change. HENT: Negative for congestion, trouble swallowing and voice change. Eyes: Negative for visual disturbance. Respiratory: Negative for apnea, cough, choking, chest tightness, shortness of breath, wheezing and stridor. Cardiovascular: Positive for palpitations. Negative for chest pain and leg swelling. Gastrointestinal: Negative for abdominal distention, abdominal pain, anal bleeding, blood in stool, nausea and vomiting. Endocrine: Negative for cold intolerance and heat intolerance. Genitourinary: Negative for hematuria. Musculoskeletal: Negative for arthralgias, gait problem, joint swelling and myalgias. Skin: Negative for color change and rash. Allergic/Immunologic: Negative for environmental allergies and food allergies. Neurological: Negative for dizziness, tremors, syncope, facial asymmetry, weakness, light-headedness, numbness and headaches.    Hematological: Does not bruise/bleed easily. Psychiatric/Behavioral: Negative for agitation, behavioral problems and sleep disturbance. Past Medical History:   Diagnosis Date    Atrial fibrillation ([facility]     Cerebral artery occlusion with cerebral infarction ([facility]     TIA from afib    CHF (NYHA class III, ACC/AHA stage C) ([facility]) 2016    Episodic headache 2016    Hyperlipidemia     Hypertension        Allergies   Allergen Reactions    Albuterol      Per patient raises my blood pressure       Current Outpatient Medications   Medication Sig Dispense Refill    rivaroxaban (XARELTO) 20 MG TABS tablet Take 1 tablet by mouth daily (with breakfast) 90 tablet 3    dilTIAZem (CARDIZEM) 60 MG tablet Take 1 tablet by mouth 2 times daily 180 tablet 3    potassium chloride (KLOR-CON M) 20 MEQ extended release tablet Take 1 tablet by mouth daily 90 tablet 3    hydrOXYzine (VISTARIL) 25 MG capsule Take 1 capsule by mouth 3 times daily as needed for Anxiety 60 capsule 2    acetaminophen (TYLENOL) 500 MG tablet Take 500 mg by mouth every 6 hours as needed for Pain      aspirin 81 MG chewable tablet Take 1 tablet by mouth daily 30 tablet 3    bumetanide (BUMEX) 2 MG tablet Take 1 tablet by mouth daily (Patient taking differently: Take 2 mg by mouth daily as needed ) 30 tablet 3    digoxin (LANOXIN) 125 MCG tablet Take 1 tablet by mouth daily (Patient not taking: Reported on 2022) 90 tablet 3     No current facility-administered medications for this visit.        Social History     Socioeconomic History    Marital status:      Spouse name: Nay    Number of children: 10    Years of education: None    Highest education level: None   Occupational History    None   Tobacco Use    Smoking status: Former Smoker     Packs/day: 0.50     Years: 4.00     Pack years: 2.00     Quit date:      Years since quittin.    Smokeless tobacco: Never Used   Vaping Use    Vaping Use: Never used   Substance and Sexual Activity    Alcohol use: Yes     Comment: SOCIALLY    Drug use: Not Currently     Types: Marijuana Champ Cue)     Comment: hx of     Sexual activity: Yes     Partners: Female   Other Topics Concern    None   Social History Narrative    None     Social Determinants of Health     Financial Resource Strain: Unknown    Difficulty of Paying Living Expenses: Patient refused   Food Insecurity: Unknown    Worried About Running Out of Food in the Last Year: Patient refused    [address] in the Last Year: Patient refused   Transportation Needs:     Lack of Transportation (Medical): Not on file    Lack of Transportation (Non-Medical): Not on file   Physical Activity:     Days of Exercise per Week: Not on file    Minutes of Exercise per Session: Not on file   Stress:     Feeling of Stress : Not on file   Social Connections:     Frequency of Communication with Friends and Family: Not on file    Frequency of Social Gatherings with Friends and Family: Not on file    Attends [employer] Services: Not on file    Active Member of [address] [place] [address] or Organizations: Not on file    Attends Club or Organization Meetings: Not on file    Marital Status: Not on file   Intimate Partner Violence:     Fear of Current or Ex-Partner: Not on file    Emotionally Abused: Not on file    Physically Abused: Not on file    Sexually Abused: Not on file   Housing Stability:     Unable to Pay for Housing in the Last Year: Not on file    Number of [place] in the Last Year: Not on file    Unstable Housing in the Last Year: Not on file       Family History   Problem Relation Age of Onset    Heart Disease Father     Cancer Maternal Aunt     Heart Failure Maternal Grandmother     Heart Disease Mother         atrial fib    No Known Problems Brother     No Known Problems Brother     No Known Problems Brother     No Known Problems Brother     Diabetes Paternal Aunt        Blood pressure 132/78, pulse 84, resp.  rate 12, height 5' 9\" (1.753 m), weight 214 lb 6.4 oz (97.3 kg). Physical Exam:    General Appearance: alert and oriented to person, place and time, in no acute distress  Cardiovascular: normal rate, regular rhythm, normal S1 and S2, no murmurs, rubs, clicks, or gallops, distal pulses intact, no carotid bruits, no JVD  Pulmonary/Chest: clear to auscultation bilaterally- no wheezes, rales or rhonchi, normal air movement, no respiratory distress  Abdomen: soft, non-tender, non-distended, normal bowel sounds, no masses   Extremities: no cyanosis, clubbing or edema, pulse   Skin: warm and dry, no rash or erythema  Head: normocephalic and atraumatic  Eyes: pupils equal, round, and reactive to light  Neck: supple and non-tender without mass, no thyromegaly   Musculoskeletal: normal range of motion, no joint swelling, deformity or tenderness  Neurological: alert, oriented, normal speech, no focal findings or movement disorder noted    Lab Data:    Cardiac Enzymes:  No results for input(s): CKTOTAL, CKMB, CKMBINDEX, TROPONINI in the last 72 hours.     CBC:   Lab Results   Component Value Date/Time    WBC 5.6 05/27/2022 06:54 AM    RBC 5.10 05/27/2022 06:54 AM    HGB 15.9 05/27/2022 06:54 AM    HCT 46.6 05/27/2022 06:54 AM     05/27/2022 06:54 AM       CMP:    Lab Results   Component Value Date/Time     05/27/2022 06:54 AM    K 4.3 05/27/2022 06:54 AM    K 3.9 05/31/2020 02:10 PM     05/27/2022 06:54 AM    CO2 24 05/27/2022 06:54 AM    BUN 17 05/27/2022 06:54 AM    CREATININE 0.9 05/27/2022 06:54 AM    LABGLOM >90 05/27/2022 06:54 AM    GLUCOSE 100 05/27/2022 06:54 AM    CALCIUM 8.9 05/27/2022 06:54 AM       Hepatic Function Panel:    Lab Results   Component Value Date/Time    ALKPHOS 62 05/27/2022 06:54 AM    ALT 15 05/27/2022 06:54 AM    AST 18 05/27/2022 06:54 AM    PROT 6.8 05/27/2022 06:54 AM    BILITOT 0.6 05/27/2022 06:54 AM    BILIDIR <0.2 02/07/2020 04:43 AM    LABALBU 4.6 05/27/2022 06:54 AM       Magnesium: Lab Results   Component Value Date/Time    MG 2.3 11/22/2021 10:29 AM       PT/INR:    Lab Results   Component Value Date/Time    INR 1.68 05/27/2022 06:54 AM       HgBA1c:  No results found for: LABA1C    FLP:    Lab Results   Component Value Date/Time    TRIG 156 07/25/2019 06:29 AM    HDL 57 07/25/2019 06:29 AM    LDLCALC 70 07/25/2019 06:29 AM    LDLDIRECT 150 01/19/2017 09:18 AM       TSH:    Lab Results   Component Value Date/Time    TSH 1.250 11/22/2021 10:29 AM        Diagnosis Orders   1. Primary hypertension  44573 - [place] ELECTROCARDIOGRAM, COMPLETE        Assessment/Plan    Is a 54years old gentleman with coronary artery disease history would nonobstructive coronary artery disease and a history of atrial fibrillation in the past back in 2020 he did have the ablation treatment recently has recurrent of his atrial fibs and he underwent cardioversion he is back in sinus rhythm has been on the Xarelto patient has a history of excessive alcohol utilization he was advised to avoid the patient had history of hypercholesterolemia and he has history of compensated left ventricular dysfunction the patient has been on the Xarelto Lanoxin the Cardizem metoprolol Bumex and the potassium. He will be scheduled to have the ablation treatment again he will continue with the rest of medication his EKG today showed sinus rhythm no acute changes we will continue current medication he will be seen in 3months he to follow-up with family physician seek medical attention for any change in clinical condition thank    Orders Placed This Encounter   Procedures    82855 - [place] ELECTROCARDIOGRAM, COMPLETE       Return in about 3 months (around 10/12/2022).      Janiya Stein MD

## 2022-07-22 ENCOUNTER — HOSPITAL ENCOUNTER (OUTPATIENT)
Dept: CT IMAGING | Age: 52
Discharge: HOME | End: 2022-07-22
Payer: COMMERCIAL

## 2022-07-22 DIAGNOSIS — I48.0 PAROXYSMAL ATRIAL FIBRILLATION (HCC): ICD-10-CM

## 2022-07-22 PROCEDURE — 75574 CT ANGIO HRT W/3D IMAGE: CPT

## 2022-07-22 PROCEDURE — 6360000004 HC RX CONTRAST MEDICATION: Performed by: INTERNAL MEDICINE

## 2022-07-22 RX ADMIN — IOPAMIDOL 80 ML: 755 INJECTION, SOLUTION INTRAVENOUS at 11:08

## 2022-07-26 ENCOUNTER — HOSPITAL ENCOUNTER (OUTPATIENT)
Dept: INPATIENT UNIT | Age: 52
Discharge: HOME | End: 2022-07-26
Attending: INTERNAL MEDICINE | Admitting: INTERNAL MEDICINE
Payer: COMMERCIAL

## 2022-07-26 VITALS
BODY MASS INDEX: 31.7 KG/M2 | WEIGHT: 214 LBS | TEMPERATURE: 98 F | HEIGHT: 69 IN | HEART RATE: 64 BPM | OXYGEN SATURATION: 96 % | SYSTOLIC BLOOD PRESSURE: 134 MMHG | RESPIRATION RATE: 18 BRPM | DIASTOLIC BLOOD PRESSURE: 85 MMHG

## 2022-07-26 LAB
ANION GAP SERPL CALCULATED.3IONS-SCNC: 11 MEQ/L (ref 8–16)
APTT: 32.8 SECONDS (ref 22–38)
BUN BLDV-MCNC: 12 MG/DL (ref 7–22)
CALCIUM SERPL-MCNC: 9.2 MG/DL (ref 8.5–10.5)
CHLORIDE BLD-SCNC: 106 MEQ/L (ref 98–111)
CO2: 23 MEQ/L (ref 23–33)
CREAT SERPL-MCNC: 0.9 MG/DL (ref 0.4–1.2)
EKG ATRIAL RATE: 63 BPM
EKG P AXIS: 67 DEGREES
EKG P-R INTERVAL: 158 MS
EKG Q-T INTERVAL: 408 MS
EKG QRS DURATION: 86 MS
EKG QTC CALCULATION (BAZETT): 417 MS
EKG R AXIS: -24 DEGREES
EKG T AXIS: 5 DEGREES
EKG VENTRICULAR RATE: 63 BPM
ERYTHROCYTE [DISTWIDTH] IN BLOOD BY AUTOMATED COUNT: 12.7 % (ref 11.5–14.5)
ERYTHROCYTE [DISTWIDTH] IN BLOOD BY AUTOMATED COUNT: 43.5 FL (ref 35–45)
GFR SERPL CREATININE-BSD FRML MDRD: > 90 ML/MIN/1.73M2
GLUCOSE BLD-MCNC: 97 MG/DL (ref 70–108)
HCT VFR BLD CALC: 43.2 % (ref 42–52)
HEMOGLOBIN: 14.4 GM/DL (ref 14–18)
INR BLD: 0.97 (ref 0.85–1.13)
MCH RBC QN AUTO: 31 PG (ref 26–33)
MCHC RBC AUTO-ENTMCNC: 33.3 GM/DL (ref 32.2–35.5)
MCV RBC AUTO: 92.9 FL (ref 80–94)
PLATELET # BLD: 229 THOU/MM3 (ref 130–400)
PMV BLD AUTO: 9.8 FL (ref 9.4–12.4)
POTASSIUM SERPL-SCNC: 4.2 MEQ/L (ref 3.5–5.2)
RBC # BLD: 4.65 MILL/MM3 (ref 4.7–6.1)
SODIUM BLD-SCNC: 140 MEQ/L (ref 135–145)
WBC # BLD: 4.7 THOU/MM3 (ref 4.8–10.8)

## 2022-07-26 PROCEDURE — 93010 ELECTROCARDIOGRAM REPORT: CPT | Performed by: INTERNAL MEDICINE

## 2022-07-26 PROCEDURE — 85610 PROTHROMBIN TIME: CPT

## 2022-07-26 PROCEDURE — 2500000003 HC RX 250 WO HCPCS

## 2022-07-26 PROCEDURE — 80048 BASIC METABOLIC PNL TOTAL CA: CPT

## 2022-07-26 PROCEDURE — 85027 COMPLETE CBC AUTOMATED: CPT

## 2022-07-26 PROCEDURE — 85730 THROMBOPLASTIN TIME PARTIAL: CPT

## 2022-07-26 PROCEDURE — 93005 ELECTROCARDIOGRAM TRACING: CPT | Performed by: INTERNAL MEDICINE

## 2022-07-26 PROCEDURE — 6360000002 HC RX W HCPCS

## 2022-07-26 PROCEDURE — 36415 COLL VENOUS BLD VENIPUNCTURE: CPT

## 2022-07-26 PROCEDURE — 2580000003 HC RX 258: Performed by: INTERNAL MEDICINE

## 2022-07-26 RX ORDER — SODIUM CHLORIDE 9 MG/ML
INJECTION, SOLUTION INTRAVENOUS PRN
Status: CANCELLED | OUTPATIENT
Start: 2022-07-26

## 2022-07-26 RX ORDER — MEPERIDINE HYDROCHLORIDE 25 MG/ML
12.5 INJECTION INTRAMUSCULAR; INTRAVENOUS; SUBCUTANEOUS EVERY 5 MIN PRN
Status: CANCELLED | OUTPATIENT
Start: 2022-07-26

## 2022-07-26 RX ORDER — SODIUM CHLORIDE 0.9 % (FLUSH) 0.9 %
5-40 SYRINGE (ML) INJECTION PRN
Status: CANCELLED | OUTPATIENT
Start: 2022-07-26

## 2022-07-26 RX ORDER — ONDANSETRON 2 MG/ML
4 INJECTION INTRAMUSCULAR; INTRAVENOUS
Status: CANCELLED | OUTPATIENT
Start: 2022-07-26 | End: 2022-07-26

## 2022-07-26 RX ORDER — SODIUM CHLORIDE 9 MG/ML
INJECTION, SOLUTION INTRAVENOUS CONTINUOUS
Status: DISCONTINUED | OUTPATIENT
Start: 2022-07-26 | End: 2022-07-26 | Stop reason: HOSPADM

## 2022-07-26 RX ORDER — DIPHENHYDRAMINE HYDROCHLORIDE 50 MG/ML
12.5 INJECTION INTRAMUSCULAR; INTRAVENOUS
Status: CANCELLED | OUTPATIENT
Start: 2022-07-26 | End: 2022-07-26

## 2022-07-26 RX ORDER — SODIUM CHLORIDE 0.9 % (FLUSH) 0.9 %
5-40 SYRINGE (ML) INJECTION EVERY 12 HOURS SCHEDULED
Status: CANCELLED | OUTPATIENT
Start: 2022-07-26

## 2022-07-26 RX ORDER — FENTANYL CITRATE 50 UG/ML
50 INJECTION, SOLUTION INTRAMUSCULAR; INTRAVENOUS EVERY 5 MIN PRN
Status: CANCELLED | OUTPATIENT
Start: 2022-07-26

## 2022-07-26 RX ADMIN — SODIUM CHLORIDE: 9 INJECTION, SOLUTION INTRAVENOUS at 06:46

## 2022-07-26 ASSESSMENT — LIFESTYLE VARIABLES
HOW MANY STANDARD DRINKS CONTAINING ALCOHOL DO YOU HAVE ON A TYPICAL DAY: PATIENT DOES NOT DRINK
HOW OFTEN DO YOU HAVE A DRINK CONTAINING ALCOHOL: NEVER

## 2022-07-26 NOTE — PROGRESS NOTES
Patient admitted to 2E04  Ambulatory for A-fib ablation. Patient NPO. Patient unaccompanied . Vital signs obtained. Assessment and data collection intiated. Oriented to room. Policies and procedures for 2E explained. All questions answered with no further questions at this time. Fall prevention and safety precautions discussed with patient.

## 2022-07-26 NOTE — PROGRESS NOTES
Procedure will rescheduled since since patient did not take his xarelto for 4 days and might need alcohol ablation of vein of Levy as well as atrial flutter RFA

## 2022-07-26 NOTE — FLOWSHEET NOTE
Discharged home in stable condition. Procedure cancelled.   Patient and spouse verbalize understanding of follow-up appt

## 2022-07-27 ENCOUNTER — TELEPHONE (OUTPATIENT)
Dept: FAMILY MEDICINE CLINIC | Age: 52
End: 2022-07-27

## 2022-07-28 NOTE — TELEPHONE ENCOUNTER
Ella [facility] Transitions Initial Follow Up Call    Outreach made within 2 business days of discharge: Yes    Patient: Mark Anthony Morris Patient : 1970   MRN: [id number]  Reason for Admission: There are no discharge diagnoses documented for the most recent discharge. Discharge Date: 22       Spoke with: the patient     Discharge department/facility: [facility]    TCM Interactive Patient Contact:  Was patient able to fill all prescriptions: N/A  Was patient instructed to bring all medications to the follow-up visit: Yes  Is patient taking all medications as directed in the discharge summary? Yes  Does patient understand their discharge instructions: Yes  Does patient have questions or concerns that need addressed prior to 7-14 day follow up office visit: no    Scheduled appointment with PCP within 7-14 days    Follow Up  No future appointments.     Neda Jones LPN

## 2023-03-13 NOTE — PROGRESS NOTES
[address] [facility]  [place] [address]  Dept: [phone]  Loc: [phone]           No chief complaint on file. Cardiologist:  Dr. Santana Iniguez      Today's visit: 3/13/2023    Subjective:    Review of Systems   Constitutional: Negative. Respiratory: Negative. Cardiovascular: Negative. Gastrointestinal: Negative. Musculoskeletal: Negative. Neurological: Negative. Psychiatric/Behavioral: Negative. Past Medical History:   Diagnosis Date    Atrial fibrillation ([facility]     Cerebral artery occlusion with cerebral infarction [facility])     TIA from afib    CHF (NYHA class III, ACC/AHA stage C) ([facility] 11/22/2016    Hyperlipidemia     Hypertension        Allergies   Allergen Reactions    Albuterol      Per patient raises my blood pressure       Current Outpatient Medications   Medication Sig Dispense Refill    rivaroxaban (XARELTO) 20 MG TABS tablet Take 1 tablet by mouth daily (with breakfast) 90 tablet 3    dilTIAZem (CARDIZEM) 60 MG tablet Take 1 tablet by mouth 2 times daily 180 tablet 3    potassium chloride (KLOR-CON M) 20 MEQ extended release tablet Take 1 tablet by mouth daily 90 tablet 3    digoxin (LANOXIN) 125 MCG tablet Take 1 tablet by mouth daily (Patient not taking: No sig reported) 90 tablet 3    hydrOXYzine (VISTARIL) 25 MG capsule Take 1 capsule by mouth 3 times daily as needed for Anxiety 60 capsule 2    acetaminophen (TYLENOL) 500 MG tablet Take 500 mg by mouth every 6 hours as needed for Pain      aspirin 81 MG chewable tablet Take 1 tablet by mouth daily (Patient not taking: Reported on 7/26/2022) 30 tablet 3    bumetanide (BUMEX) 2 MG tablet Take 1 tablet by mouth daily (Patient taking differently: Take 2 mg by mouth daily as needed) 30 tablet 3     No current facility-administered medications for this visit.        Social History     Socioeconomic History    Marital status:      Spouse name: aNy    Number of children: 6   Tobacco Use    Smoking status: Former     Packs/day: 0.50     Years: 4.00     Pack years: 2.00     Types: Cigarettes     Quit date:      Years since quittin.2    Smokeless tobacco: Never   Vaping Use    Vaping Use: Never used   Substance and Sexual Activity    Alcohol use: Yes     Alcohol/week: 12.0 standard drinks     Types: 12 Cans of beer per week     Comment: SOCIALLY    Drug use: Not Currently     Comment: hx of     Sexual activity: Yes     Partners: Female       Family History   Problem Relation Age of Onset    Heart Disease Father     Cancer Maternal Aunt     Heart Failure Maternal Grandmother     Heart Disease Mother         atrial fib    No Known Problems Brother     No Known Problems Brother     No Known Problems Brother     No Known Problems Brother     Diabetes Paternal Aunt        There were no vitals taken for this visit. Physical Exam  Constitutional:       Appearance: Normal appearance. Cardiovascular:      Rate and Rhythm: Normal rate and regular rhythm. Heart sounds: Normal heart sounds. Pulmonary:      Effort: Pulmonary effort is normal.      Breath sounds: Normal breath sounds. Abdominal:      General: Bowel sounds are normal.      Palpations: Abdomen is soft. Musculoskeletal:         General: Normal range of motion. Skin:     General: Skin is warm and dry. Neurological:      General: No focal deficit present. Mental Status: He is alert and oriented to person, place, and time. Psychiatric:         Mood and Affect: Mood normal.         Behavior: Behavior normal.         EKG: normal sinus rhythm, nonspecific ST and T waves changes, unchanged from previous tracings. Diagnosis Orders   1. PAF (paroxysmal atrial fibrillation) ([facility] S/P pvi 2020 AND NOW RECURRENCE 21 WITH cvr        2. S/P ablation of atrial flutter        3. Primary hypertension        4.  Coronary artery disease involving native coronary artery of native heart without angina pectoris        5. Hyperlipidemia LDL goal <130              Assessment and Plan:  Ashlee Santizo is a pleasant 65-year-old gentleman who presents today for yearly follow-up. Ashlee Santizo is not been having any chest pain shortness of breath dizziness lightheadedness palpitations or lower extremity swelling. His EKG is a sinus rhythm with no changes from prior. He is currently taking Bumex as a as needed medication. He is having no problems with any of his medications no bleeding with the Xarelto. His blood pressure is elevated today but he says that it runs lower at home. Looking back on prior exams and his blood pressures run around 130s. 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 He will have the occupational health send the paperwork to us. Nonobstructive CAD -stable no complaints of any chest discomfort. Last heart catheterization was 7/25/2019 last stress test was 3/17/2021. Recurrent AFB - on xarelto -- SR today [id number] any palpitations on Xarelto   Sp ablation 7/2022   - hx CV     HTN -elevated in the office today but normally runs around 130s. ETOH use       Ashlee Santizo will follow back with us in 1 years time or sooner should he have any difficulties. He will have occupational [employer] send us his DOT form so we can fill that out. No orders of the defined types were placed in this encounter. Continue Dr Ashlee Santizo current treatment plan    There are no Patient Instructions on file for this visit. No follow-ups on file.     Follow up with Dr Lauren Ochoa as scheduled or sooner if needed    ISABEL Moya - CNP

## 2023-03-14 ENCOUNTER — OFFICE VISIT (OUTPATIENT)
Dept: CARDIOLOGY CLINIC | Age: 53
End: 2023-03-14
Payer: COMMERCIAL

## 2023-03-14 VITALS
WEIGHT: 216 LBS | HEIGHT: 69 IN | HEART RATE: 76 BPM | RESPIRATION RATE: 18 BRPM | DIASTOLIC BLOOD PRESSURE: 100 MMHG | BODY MASS INDEX: 31.99 KG/M2 | SYSTOLIC BLOOD PRESSURE: 145 MMHG

## 2023-03-14 DIAGNOSIS — Z86.79 S/P ABLATION OF ATRIAL FLUTTER: ICD-10-CM

## 2023-03-14 DIAGNOSIS — I10 PRIMARY HYPERTENSION: ICD-10-CM

## 2023-03-14 DIAGNOSIS — I48.0 PAF (PAROXYSMAL ATRIAL FIBRILLATION) (HCC): Primary | ICD-10-CM

## 2023-03-14 DIAGNOSIS — I25.10 CORONARY ARTERY DISEASE INVOLVING NATIVE CORONARY ARTERY OF NATIVE HEART WITHOUT ANGINA PECTORIS: ICD-10-CM

## 2023-03-14 DIAGNOSIS — F41.9 ANXIETY: ICD-10-CM

## 2023-03-14 DIAGNOSIS — Z98.890 S/P ABLATION OF ATRIAL FLUTTER: ICD-10-CM

## 2023-03-14 DIAGNOSIS — E78.5 HYPERLIPIDEMIA LDL GOAL <130: ICD-10-CM

## 2023-03-14 PROCEDURE — 3080F DIAST BP >= 90 MM HG: CPT | Performed by: NURSE PRACTITIONER

## 2023-03-14 PROCEDURE — 3077F SYST BP >= 140 MM HG: CPT | Performed by: NURSE PRACTITIONER

## 2023-03-14 PROCEDURE — 93000 ELECTROCARDIOGRAM COMPLETE: CPT | Performed by: NURSE PRACTITIONER

## 2023-03-14 PROCEDURE — 99214 OFFICE O/P EST MOD 30 MIN: CPT | Performed by: NURSE PRACTITIONER

## 2023-03-14 RX ORDER — POTASSIUM CHLORIDE 20 MEQ/1
20 TABLET, EXTENDED RELEASE ORAL DAILY
Qty: 90 TABLET | Refills: 3 | Status: SHIPPED | OUTPATIENT
Start: 2023-03-14

## 2023-03-14 RX ORDER — BUMETANIDE 2 MG/1
2 TABLET ORAL DAILY PRN
Qty: 60 TABLET | Refills: 3 | Status: SHIPPED | OUTPATIENT
Start: 2023-03-14

## 2023-03-14 RX ORDER — LOSARTAN POTASSIUM 25 MG/1
25 TABLET ORAL DAILY
Qty: 90 TABLET | Refills: 3 | Status: SHIPPED | OUTPATIENT
Start: 2023-03-14

## 2023-03-14 RX ORDER — DILTIAZEM HYDROCHLORIDE 60 MG/1
60 TABLET, FILM COATED ORAL 2 TIMES DAILY
Qty: 180 TABLET | Refills: 3 | Status: SHIPPED | OUTPATIENT
Start: 2023-03-14

## 2023-03-14 RX ORDER — HYDROXYZINE PAMOATE 25 MG/1
25 CAPSULE ORAL 3 TIMES DAILY PRN
Qty: 270 CAPSULE | Refills: 3 | Status: SHIPPED | OUTPATIENT
Start: 2023-03-14

## 2023-03-14 RX ORDER — LOSARTAN POTASSIUM 25 MG/1
25 TABLET ORAL DAILY
COMMUNITY
End: 2023-03-14 | Stop reason: SDUPTHER

## 2023-03-14 ASSESSMENT — ENCOUNTER SYMPTOMS
RESPIRATORY NEGATIVE: 1
GASTROINTESTINAL NEGATIVE: 1

## 2023-03-17 ENCOUNTER — TELEPHONE (OUTPATIENT)
Dept: CARDIOLOGY CLINIC | Age: 53
End: 2023-03-17

## 2023-03-17 DIAGNOSIS — R06.02 SHORTNESS OF BREATH: ICD-10-CM

## 2023-03-17 DIAGNOSIS — R94.31 ABNORMAL EKG: Primary | ICD-10-CM

## 2023-03-22 ENCOUNTER — HOSPITAL ENCOUNTER (OUTPATIENT)
Dept: NON INVASIVE DIAGNOSTICS | Age: 53
Discharge: HOME | End: 2023-03-22
Payer: COMMERCIAL

## 2023-03-22 DIAGNOSIS — R94.31 ABNORMAL EKG: ICD-10-CM

## 2023-03-22 PROCEDURE — 93017 CV STRESS TEST TRACING ONLY: CPT | Performed by: NUCLEAR MEDICINE

## 2023-04-04 ENCOUNTER — HOSPITAL ENCOUNTER (OUTPATIENT)
Dept: NON INVASIVE DIAGNOSTICS | Age: 53
Discharge: HOME | End: 2023-04-04
Payer: COMMERCIAL

## 2023-04-04 DIAGNOSIS — R06.02 SHORTNESS OF BREATH: ICD-10-CM

## 2023-04-04 LAB
LV EF: 43 %
LVEF MODALITY: NORMAL

## 2023-04-04 PROCEDURE — 93306 TTE W/DOPPLER COMPLETE: CPT

## 2023-10-02 ENCOUNTER — COMMUNITY OUTREACH (OUTPATIENT)
Dept: FAMILY MEDICINE CLINIC | Age: 53
End: 2023-10-02

## 2024-02-25 NOTE — PROGRESS NOTES
[facility] [place] [facility]  [facility] CARDIOLOGY  [address]  [address]  [facility] [address]  Dept: [phone]  Dept Fax: [phone]  Loc: [phone]    Visit Date: 2/27/2024    Mr. Irby is a 53 y.o. male  who presented for:  New patient, [facility] cardiac care   Atrial fibrillation   HPI:   HPI   Manuellinda Irby is a pleasant 53 year old male patient who  has a past medical history of Atrial fibrillation (HCC), Cerebral artery occlusion with cerebral infarction (HCC), CHF (NYHA class III, ACC/AHA stage C) (HCC), Hyperlipidemia, and Hypertension. LHC in 2019 revealed nonobstructive CAD, including intermediate disease of LAD/Diagonal. Has h/o paroxysmal atrial fibrillation and prior atrial fibrillation ablation (PVI). Echocardiogram 4/2023 revealed an EF of 40-45%, LAE. Exercise stress test 3/2023 was negative for ischemia. Patient denies chest pain, shortness of breath, dyspnea on exertion. Denies palpitations, dizziness, syncope.       Current Outpatient Medications:     losartan (COZAAR) 25 MG tablet, Take 1 tablet by mouth daily, Disp: 90 tablet, Rfl: 3    hydrOXYzine pamoate (VISTARIL) 25 MG capsule, Take 1 capsule by mouth 3 times daily as needed for Anxiety, Disp: 270 capsule, Rfl: 3    potassium chloride (KLOR-CON M) 20 MEQ extended release tablet, Take 1 tablet by mouth daily, Disp: 90 tablet, Rfl: 3    dilTIAZem (CARDIZEM) 60 MG tablet, Take 1 tablet by mouth 2 times daily, Disp: 180 tablet, Rfl: 3    rivaroxaban (XARELTO) 20 MG TABS tablet, Take 1 tablet by mouth daily (with breakfast), Disp: 90 tablet, Rfl: 3    bumetanide (BUMEX) 2 MG tablet, Take 1 tablet by mouth daily as needed (swelling), Disp: 60 tablet, Rfl: 3    acetaminophen (TYLENOL) 500 MG tablet, Take 500 mg by mouth every 6 hours as needed for Pain, Disp: , Rfl:     aspirin 81 MG chewable tablet, Take 1 tablet by mouth daily, Disp: 30 tablet, Rfl: 3    Past Medical History  Manuel  has a past medical

## 2024-02-26 ENCOUNTER — HOSPITAL ENCOUNTER (OUTPATIENT)
Age: 54
Discharge: HOME | End: 2024-02-26
Payer: COMMERCIAL

## 2024-02-26 DIAGNOSIS — I10 PRIMARY HYPERTENSION: ICD-10-CM

## 2024-02-26 DIAGNOSIS — F41.9 ANXIETY: ICD-10-CM

## 2024-02-26 DIAGNOSIS — E78.5 HYPERLIPIDEMIA LDL GOAL <130: ICD-10-CM

## 2024-02-26 LAB
ALBUMIN SERPL BCG-MCNC: 4.9 G/DL (ref 3.5–5.1)
ALP SERPL-CCNC: 69 U/L (ref 38–126)
ALT SERPL W/O P-5'-P-CCNC: 21 U/L (ref 11–66)
ANION GAP SERPL CALC-SCNC: 12 MEQ/L (ref 8–16)
AST SERPL-CCNC: 22 U/L (ref 5–40)
BILIRUB CONJ SERPL-MCNC: < 0.2 MG/DL (ref 0–0.3)
BILIRUB SERPL-MCNC: 0.4 MG/DL (ref 0.3–1.2)
BUN SERPL-MCNC: 13 MG/DL (ref 7–22)
CALCIUM SERPL-MCNC: 9.4 MG/DL (ref 8.5–10.5)
CHLORIDE SERPL-SCNC: 103 MEQ/L (ref 98–111)
CHOLEST SERPL-MCNC: 190 MG/DL (ref 100–199)
CO2 SERPL-SCNC: 24 MEQ/L (ref 23–33)
CREAT SERPL-MCNC: 1.1 MG/DL (ref 0.4–1.2)
DEPRECATED RDW RBC AUTO: 41.1 FL (ref 35–45)
ERYTHROCYTE [DISTWIDTH] IN BLOOD BY AUTOMATED COUNT: 12.4 % (ref 11.5–14.5)
GFR SERPL CREATININE-BSD FRML MDRD: > 60 ML/MIN/1.73M2
GLUCOSE SERPL-MCNC: 94 MG/DL (ref 70–108)
HCT VFR BLD AUTO: 43.4 % (ref 42–52)
HDLC SERPL-MCNC: 54 MG/DL
HGB BLD-MCNC: 14.9 GM/DL (ref 14–18)
LDLC SERPL CALC-MCNC: 121 MG/DL
MCH RBC QN AUTO: 31.4 PG (ref 26–33)
MCHC RBC AUTO-ENTMCNC: 34.3 GM/DL (ref 32.2–35.5)
MCV RBC AUTO: 91.4 FL (ref 80–94)
PLATELET # BLD AUTO: 262 THOU/MM3 (ref 130–400)
PMV BLD AUTO: 9.9 FL (ref 9.4–12.4)
POTASSIUM SERPL-SCNC: 4.3 MEQ/L (ref 3.5–5.2)
PROT SERPL-MCNC: 8 G/DL (ref 6.1–8)
RBC # BLD AUTO: 4.75 MILL/MM3 (ref 4.7–6.1)
SODIUM SERPL-SCNC: 139 MEQ/L (ref 135–145)
TRIGL SERPL-MCNC: 73 MG/DL (ref 0–199)
WBC # BLD AUTO: 7.1 THOU/MM3 (ref 4.8–10.8)

## 2024-02-26 PROCEDURE — 85027 COMPLETE CBC AUTOMATED: CPT

## 2024-02-26 PROCEDURE — 80061 LIPID PANEL: CPT

## 2024-02-26 PROCEDURE — 36415 COLL VENOUS BLD VENIPUNCTURE: CPT

## 2024-02-26 PROCEDURE — 82248 BILIRUBIN DIRECT: CPT

## 2024-02-26 PROCEDURE — 80053 COMPREHEN METABOLIC PANEL: CPT

## 2024-02-27 ENCOUNTER — TELEPHONE (OUTPATIENT)
Dept: CARDIOLOGY CLINIC | Age: 54
End: 2024-02-27

## 2024-02-27 ENCOUNTER — OFFICE VISIT (OUTPATIENT)
Dept: CARDIOLOGY CLINIC | Age: 54
End: 2024-02-27
Payer: COMMERCIAL

## 2024-02-27 VITALS
HEIGHT: 69 IN | SYSTOLIC BLOOD PRESSURE: 124 MMHG | HEART RATE: 92 BPM | WEIGHT: 235.5 LBS | DIASTOLIC BLOOD PRESSURE: 78 MMHG | BODY MASS INDEX: 34.88 KG/M2

## 2024-02-27 DIAGNOSIS — I48.0 PAF (PAROXYSMAL ATRIAL FIBRILLATION) (HCC): Primary | ICD-10-CM

## 2024-02-27 PROCEDURE — 1036F TOBACCO NON-USER: CPT | Performed by: INTERNAL MEDICINE

## 2024-02-27 PROCEDURE — 3078F DIAST BP <80 MM HG: CPT | Performed by: INTERNAL MEDICINE

## 2024-02-27 PROCEDURE — 3017F COLORECTAL CA SCREEN DOC REV: CPT | Performed by: INTERNAL MEDICINE

## 2024-02-27 PROCEDURE — G8417 CALC BMI ABV UP PARAM F/U: HCPCS | Performed by: INTERNAL MEDICINE

## 2024-02-27 PROCEDURE — G8427 DOCREV CUR MEDS BY ELIG CLIN: HCPCS | Performed by: INTERNAL MEDICINE

## 2024-02-27 PROCEDURE — 93000 ELECTROCARDIOGRAM COMPLETE: CPT | Performed by: INTERNAL MEDICINE

## 2024-02-27 PROCEDURE — 3074F SYST BP LT 130 MM HG: CPT | Performed by: INTERNAL MEDICINE

## 2024-02-27 PROCEDURE — 99204 OFFICE O/P NEW MOD 45 MIN: CPT | Performed by: INTERNAL MEDICINE

## 2024-02-27 PROCEDURE — G8484 FLU IMMUNIZE NO ADMIN: HCPCS | Performed by: INTERNAL MEDICINE

## 2024-02-27 RX ORDER — ASPIRIN 81 MG/1
81 TABLET ORAL DAILY
Qty: 90 TABLET | Refills: 0 | Status: SHIPPED | OUTPATIENT
Start: 2024-02-27

## 2024-02-27 RX ORDER — ATORVASTATIN CALCIUM 10 MG/1
10 TABLET, FILM COATED ORAL DAILY
Qty: 30 TABLET | Refills: 3 | Status: SHIPPED | OUTPATIENT
Start: 2024-02-27

## 2024-02-27 RX ORDER — ASPIRIN 325 MG
325 TABLET ORAL DAILY
COMMUNITY
End: 2024-02-27 | Stop reason: ALTCHOICE

## 2024-02-27 NOTE — PROGRESS NOTES
New patient here for check up former Dr. Ramirez pt  needs clearance for D.O.T.    Pt states no c/o     EKG done today

## 2024-02-28 ENCOUNTER — HOSPITAL ENCOUNTER (OUTPATIENT)
Dept: NUCLEAR MEDICINE | Age: 54
Discharge: HOME | End: 2024-02-28
Attending: INTERNAL MEDICINE
Payer: COMMERCIAL

## 2024-02-28 ENCOUNTER — HOSPITAL ENCOUNTER (OUTPATIENT)
Age: 54
Discharge: HOME | End: 2024-03-01
Attending: INTERNAL MEDICINE
Payer: COMMERCIAL

## 2024-02-28 VITALS — BODY MASS INDEX: 34.07 KG/M2 | WEIGHT: 230 LBS | HEIGHT: 69 IN

## 2024-02-28 DIAGNOSIS — I48.0 PAF (PAROXYSMAL ATRIAL FIBRILLATION) (HCC): ICD-10-CM

## 2024-02-28 LAB
ECHO BSA: 2.25 M2
NUC STRESS EJECTION FRACTION: 45 %
STRESS BASELINE DIAS BP: 78 MMHG
STRESS BASELINE HR: 71 BPM
STRESS BASELINE SYS BP: 139 MMHG
STRESS STAGE 1 BP: NORMAL MMHG
STRESS STAGE 1 DURATION: 1 MIN:SEC
STRESS STAGE 1 HR: 76 BPM
STRESS STAGE 2 BP: NORMAL MMHG
STRESS STAGE 2 DURATION: 1 MIN:SEC
STRESS STAGE 2 HR: 94 BPM
STRESS STAGE 3 BP: NORMAL MMHG
STRESS STAGE 3 DURATION: 1 MIN:SEC
STRESS STAGE 3 HR: 94 BPM
STRESS STAGE RECOVERY 1 BP: NORMAL MMHG
STRESS STAGE RECOVERY 1 DURATION: 1 MIN:SEC
STRESS STAGE RECOVERY 1 HR: 94 BPM
STRESS STAGE RECOVERY 2 BP: NORMAL MMHG
STRESS STAGE RECOVERY 2 DURATION: 1 MIN:SEC
STRESS STAGE RECOVERY 2 HR: 88 BPM
STRESS STAGE RECOVERY 3 BP: NORMAL MMHG
STRESS STAGE RECOVERY 3 DURATION: 1 MIN:SEC
STRESS STAGE RECOVERY 3 HR: 85 BPM
STRESS STAGE RECOVERY 4 BP: NORMAL MMHG
STRESS STAGE RECOVERY 4 DURATION: 2 MIN:SEC
STRESS STAGE RECOVERY 4 HR: 83 BPM
STRESS TARGET HR: 169 BPM
TID: 0.97

## 2024-02-28 PROCEDURE — 3430000000 HC RX DIAGNOSTIC RADIOPHARMACEUTICAL: Performed by: INTERNAL MEDICINE

## 2024-02-28 PROCEDURE — A9500 TC99M SESTAMIBI: HCPCS | Performed by: INTERNAL MEDICINE

## 2024-02-28 PROCEDURE — 93017 CV STRESS TEST TRACING ONLY: CPT

## 2024-02-28 PROCEDURE — 78452 HT MUSCLE IMAGE SPECT MULT: CPT

## 2024-02-28 PROCEDURE — 6360000002 HC RX W HCPCS: Performed by: INTERNAL MEDICINE

## 2024-02-28 RX ORDER — TETRAKIS(2-METHOXYISOBUTYLISOCYANIDE)COPPER(I) TETRAFLUOROBORATE 1 MG/ML
31.4 INJECTION, POWDER, LYOPHILIZED, FOR SOLUTION INTRAVENOUS
Status: COMPLETED | OUTPATIENT
Start: 2024-02-28 | End: 2024-02-28

## 2024-02-28 RX ORDER — REGADENOSON 0.08 MG/ML
0.4 INJECTION, SOLUTION INTRAVENOUS
Status: COMPLETED | OUTPATIENT
Start: 2024-02-28 | End: 2024-02-28

## 2024-02-28 RX ORDER — TETRAKIS(2-METHOXYISOBUTYLISOCYANIDE)COPPER(I) TETRAFLUOROBORATE 1 MG/ML
10.5 INJECTION, POWDER, LYOPHILIZED, FOR SOLUTION INTRAVENOUS
Status: COMPLETED | OUTPATIENT
Start: 2024-02-28 | End: 2024-02-28

## 2024-02-28 RX ADMIN — Medication 10.5 MILLICURIE: at 14:33

## 2024-02-28 RX ADMIN — Medication 31.4 MILLICURIE: at 15:24

## 2024-02-28 RX ADMIN — REGADENOSON 0.4 MG: 0.08 INJECTION, SOLUTION INTRAVENOUS at 15:23

## 2024-02-29 NOTE — TELEPHONE ENCOUNTER
Mukesh Martin MD  2/28/2024  4:41 PM EST                Please inform patient that stress test was not suggestive for ischemia \"blockages within coronary arteries\".     If symptoms recur or persist, patient should call office.     Dr. Martin ok for D.O. T. Clearance letter?

## 2024-03-01 NOTE — TELEPHONE ENCOUNTER
LM for patient to return call.  Letter out for signature.  Will he  or does he have fax #?    *Update, looks like paperwork was completed, signed and faxed yesterday.   If patient calls back, we can let him know its been done.

## 2024-08-28 ENCOUNTER — APPOINTMENT (OUTPATIENT)
Age: 54
End: 2024-08-28
Attending: INTERNAL MEDICINE
Payer: COMMERCIAL

## 2024-08-28 ENCOUNTER — APPOINTMENT (OUTPATIENT)
Dept: GENERAL RADIOLOGY | Age: 54
End: 2024-08-28
Payer: COMMERCIAL

## 2024-08-28 ENCOUNTER — HOSPITAL ENCOUNTER (INPATIENT)
Age: 54
LOS: 3 days | Discharge: HOME | End: 2024-08-31
Attending: STUDENT IN AN ORGANIZED HEALTH CARE EDUCATION/TRAINING PROGRAM
Payer: COMMERCIAL

## 2024-08-28 ENCOUNTER — HOSPITAL ENCOUNTER (OUTPATIENT)
Age: 54
Discharge: HOME | End: 2024-08-30
Attending: INTERNAL MEDICINE
Payer: COMMERCIAL

## 2024-08-28 VITALS — OXYGEN SATURATION: 98 %

## 2024-08-28 DIAGNOSIS — I10 PRIMARY HYPERTENSION: ICD-10-CM

## 2024-08-28 DIAGNOSIS — I48.91 ATRIAL FIBRILLATION WITH RVR (HCC): ICD-10-CM

## 2024-08-28 DIAGNOSIS — R07.9 CHEST PAIN: ICD-10-CM

## 2024-08-28 DIAGNOSIS — R07.9 CHEST PAIN, UNSPECIFIED TYPE: Primary | ICD-10-CM

## 2024-08-28 DIAGNOSIS — I48.20 CHRONIC ATRIAL FIBRILLATION (HCC): ICD-10-CM

## 2024-08-28 DIAGNOSIS — F41.9 ANXIETY: ICD-10-CM

## 2024-08-28 DIAGNOSIS — E78.5 HYPERLIPIDEMIA LDL GOAL <130: ICD-10-CM

## 2024-08-28 DIAGNOSIS — I48.91 ATRIAL FIBRILLATION (HCC): Primary | ICD-10-CM

## 2024-08-28 DIAGNOSIS — I42.9 CARDIOMYOPATHY, UNSPECIFIED TYPE (HCC): ICD-10-CM

## 2024-08-28 LAB
ANION GAP SERPL CALC-SCNC: 11 MEQ/L (ref 8–16)
ANION GAP SERPL CALC-SCNC: 13 MEQ/L (ref 8–16)
B PERT DNA NPH QL NAA+PROBE: NOT DETECTED
BACTERIA: NORMAL
BASOPHILS ABSOLUTE: 0.1 THOU/MM3 (ref 0–0.1)
BASOPHILS NFR BLD AUTO: 0.6 %
BILIRUB UR QL STRIP: NEGATIVE
BORDETELLA PARAPERTUSSIS BY PCR: NOT DETECTED
BUN SERPL-MCNC: 10 MG/DL (ref 7–22)
BUN SERPL-MCNC: 12 MG/DL (ref 7–22)
C PNEUM DNA SPEC QL NAA+PROBE: NOT DETECTED
CALCIUM SERPL-MCNC: 8.7 MG/DL (ref 8.5–10.5)
CALCIUM SERPL-MCNC: 8.8 MG/DL (ref 8.5–10.5)
CASTS #/AREA URNS LPF: NORMAL /LPF
CASTS #/AREA URNS LPF: NORMAL /LPF
CHARACTER UR: CLEAR
CHARCOAL URNS QL MICRO: NORMAL
CHLORIDE SERPL-SCNC: 103 MEQ/L (ref 98–111)
CHLORIDE SERPL-SCNC: 104 MEQ/L (ref 98–111)
CO2 SERPL-SCNC: 20 MEQ/L (ref 23–33)
CO2 SERPL-SCNC: 24 MEQ/L (ref 23–33)
COLOR UR: YELLOW
CREAT SERPL-MCNC: 0.9 MG/DL (ref 0.4–1.2)
CREAT SERPL-MCNC: 1.2 MG/DL (ref 0.4–1.2)
CRYSTALS URNS QL MICRO: NORMAL
DEPRECATED RDW RBC AUTO: 42.4 FL (ref 35–45)
DEPRECATED RDW RBC AUTO: 43 FL (ref 35–45)
ECHO BSA: 2.27 M2
ECHO BSA: 2.27 M2
EKG ATRIAL RATE: 76 BPM
EKG P AXIS: 53 DEGREES
EKG P-R INTERVAL: 154 MS
EKG Q-T INTERVAL: 262 MS
EKG Q-T INTERVAL: 372 MS
EKG Q-T INTERVAL: 372 MS
EKG Q-T INTERVAL: 412 MS
EKG QRS DURATION: 72 MS
EKG QRS DURATION: 76 MS
EKG QRS DURATION: 82 MS
EKG QRS DURATION: 92 MS
EKG QTC CALCULATION (BAZETT): 410 MS
EKG QTC CALCULATION (BAZETT): 462 MS
EKG QTC CALCULATION (BAZETT): 463 MS
EKG QTC CALCULATION (BAZETT): 474 MS
EKG R AXIS: -13 DEGREES
EKG R AXIS: -24 DEGREES
EKG R AXIS: -4 DEGREES
EKG R AXIS: -8 DEGREES
EKG T AXIS: 14 DEGREES
EKG T AXIS: 17 DEGREES
EKG T AXIS: 35 DEGREES
EKG T AXIS: 40 DEGREES
EKG VENTRICULAR RATE: 147 BPM
EKG VENTRICULAR RATE: 76 BPM
EKG VENTRICULAR RATE: 93 BPM
EKG VENTRICULAR RATE: 98 BPM
EOSINOPHIL NFR BLD AUTO: 1.3 %
EOSINOPHILS ABSOLUTE: 0.1 THOU/MM3 (ref 0–0.4)
EPITHELIAL CELLS, UA: NORMAL /HPF
ERYTHROCYTE [DISTWIDTH] IN BLOOD BY AUTOMATED COUNT: 12.8 % (ref 11.5–14.5)
ERYTHROCYTE [DISTWIDTH] IN BLOOD BY AUTOMATED COUNT: 13 % (ref 11.5–14.5)
FLUAV RNA NPH QL NAA+PROBE: NOT DETECTED
FLUBV RNA NPH QL NAA+PROBE: NOT DETECTED
GFR SERPL CREATININE-BSD FRML MDRD: 73 ML/MIN/1.73M2
GFR SERPL CREATININE-BSD FRML MDRD: > 90 ML/MIN/1.73M2
GLUCOSE SERPL-MCNC: 103 MG/DL (ref 70–108)
GLUCOSE SERPL-MCNC: 105 MG/DL (ref 70–108)
GLUCOSE UR QL STRIP.AUTO: NEGATIVE MG/DL
HADV DNA NPH QL NAA+PROBE: NOT DETECTED
HCOV 229E RNA SPEC QL NAA+PROBE: NOT DETECTED
HCOV HKU1 RNA SPEC QL NAA+PROBE: NOT DETECTED
HCOV NL63 RNA SPEC QL NAA+PROBE: NOT DETECTED
HCOV OC43 RNA SPEC QL NAA+PROBE: NOT DETECTED
HCT VFR BLD AUTO: 41.5 % (ref 42–52)
HCT VFR BLD AUTO: 42.5 % (ref 42–52)
HGB BLD-MCNC: 14.3 GM/DL (ref 14–18)
HGB BLD-MCNC: 14.6 GM/DL (ref 14–18)
HGB UR QL STRIP.AUTO: NEGATIVE
HMPV RNA NPH QL NAA+PROBE: NOT DETECTED
HPIV1 RNA NPH QL NAA+PROBE: NOT DETECTED
HPIV2 RNA NPH QL NAA+PROBE: NOT DETECTED
HPIV3 RNA NPH QL NAA+PROBE: NOT DETECTED
HPIV4 RNA NPH QL NAA+PROBE: NOT DETECTED
IMM GRANULOCYTES # BLD AUTO: 0.02 THOU/MM3 (ref 0–0.07)
IMM GRANULOCYTES NFR BLD AUTO: 0.2 %
KETONES UR QL STRIP.AUTO: NEGATIVE
LEUKOCYTE ESTERASE UR QL STRIP.AUTO: NEGATIVE
LYMPHOCYTES ABSOLUTE: 2.4 THOU/MM3 (ref 1–4.8)
LYMPHOCYTES NFR BLD AUTO: 27.5 %
M PNEUMO DNA SPEC QL NAA+PROBE: NOT DETECTED
MAGNESIUM SERPL-MCNC: 2.6 MG/DL (ref 1.6–2.4)
MCH RBC QN AUTO: 31.1 PG (ref 26–33)
MCH RBC QN AUTO: 31.4 PG (ref 26–33)
MCHC RBC AUTO-ENTMCNC: 34.4 GM/DL (ref 32.2–35.5)
MCHC RBC AUTO-ENTMCNC: 34.5 GM/DL (ref 32.2–35.5)
MCV RBC AUTO: 90.4 FL (ref 80–94)
MCV RBC AUTO: 91 FL (ref 80–94)
MONOCYTES ABSOLUTE: 0.6 THOU/MM3 (ref 0.4–1.3)
MONOCYTES NFR BLD AUTO: 7.4 %
MRSA DNA SPEC QL NAA+PROBE: NEGATIVE
NEUTROPHILS ABSOLUTE: 5.5 THOU/MM3 (ref 1.8–7.7)
NEUTROPHILS NFR BLD AUTO: 63 %
NITRITE UR QL STRIP.AUTO: NEGATIVE
NRBC BLD AUTO-RTO: 0 /100 WBC
NT-PROBNP SERPL IA-MCNC: 847.3 PG/ML (ref 0–124)
OSMOLALITY SERPL CALC.SUM OF ELEC: 275.8 MOSMOL/KG (ref 275–300)
PH UR STRIP.AUTO: 7.5 [PH] (ref 5–9)
PLATELET # BLD AUTO: 237 THOU/MM3 (ref 130–400)
PLATELET # BLD AUTO: 256 THOU/MM3 (ref 130–400)
PMV BLD AUTO: 10.2 FL (ref 9.4–12.4)
PMV BLD AUTO: 10.4 FL (ref 9.4–12.4)
POTASSIUM SERPL-SCNC: 3.9 MEQ/L (ref 3.5–5.2)
POTASSIUM SERPL-SCNC: 4.4 MEQ/L (ref 3.5–5.2)
PROT UR STRIP.AUTO-MCNC: NEGATIVE MG/DL
RBC # BLD AUTO: 4.56 MILL/MM3 (ref 4.7–6.1)
RBC # BLD AUTO: 4.7 MILL/MM3 (ref 4.7–6.1)
RBC #/AREA URNS HPF: NORMAL /HPF
REASON FOR REJECTION: NORMAL
REJECTED TEST: NORMAL
RENAL EPI CELLS #/AREA URNS HPF: NORMAL /[HPF]
RSV RNA NPH QL NAA+PROBE: NOT DETECTED
RV+EV RNA SPEC QL NAA+PROBE: NOT DETECTED
SARS-COV-2 RNA NPH QL NAA+NON-PROBE: NOT DETECTED
SODIUM SERPL-SCNC: 137 MEQ/L (ref 135–145)
SODIUM SERPL-SCNC: 138 MEQ/L (ref 135–145)
SPECIFIC GRAVITY UA: 1.01 (ref 1–1.03)
TROPONIN, HIGH SENSITIVITY: < 6 NG/L (ref 0–12)
TROPONIN, HIGH SENSITIVITY: < 6 NG/L (ref 0–12)
TSH SERPL DL<=0.005 MIU/L-ACNC: 2.35 UIU/ML (ref 0.4–4.2)
UROBILINOGEN, URINE: 0.2 EU/DL (ref 0–1)
WBC # BLD AUTO: 8 THOU/MM3 (ref 4.8–10.8)
WBC # BLD AUTO: 8.7 THOU/MM3 (ref 4.8–10.8)
WBC #/AREA URNS HPF: NORMAL /HPF
YEAST LIKE FUNGI URNS QL MICRO: NORMAL

## 2024-08-28 PROCEDURE — 71045 X-RAY EXAM CHEST 1 VIEW: CPT

## 2024-08-28 PROCEDURE — 83735 ASSAY OF MAGNESIUM: CPT

## 2024-08-28 PROCEDURE — 81001 URINALYSIS AUTO W/SCOPE: CPT

## 2024-08-28 PROCEDURE — 96374 THER/PROPH/DIAG INJ IV PUSH: CPT

## 2024-08-28 PROCEDURE — 6370000000 HC RX 637 (ALT 250 FOR IP)

## 2024-08-28 PROCEDURE — 83880 ASSAY OF NATRIURETIC PEPTIDE: CPT

## 2024-08-28 PROCEDURE — 99285 EMERGENCY DEPT VISIT HI MDM: CPT

## 2024-08-28 PROCEDURE — 5A2204Z RESTORATION OF CARDIAC RHYTHM, SINGLE: ICD-10-PCS | Performed by: INTERNAL MEDICINE

## 2024-08-28 PROCEDURE — 36415 COLL VENOUS BLD VENIPUNCTURE: CPT

## 2024-08-28 PROCEDURE — 93325 DOPPLER ECHO COLOR FLOW MAPG: CPT | Performed by: INTERNAL MEDICINE

## 2024-08-28 PROCEDURE — 92960 CARDIOVERSION ELECTRIC EXT: CPT

## 2024-08-28 PROCEDURE — 84443 ASSAY THYROID STIM HORMONE: CPT

## 2024-08-28 PROCEDURE — 6360000002 HC RX W HCPCS: Performed by: INTERNAL MEDICINE

## 2024-08-28 PROCEDURE — 93312 ECHO TRANSESOPHAGEAL: CPT | Performed by: INTERNAL MEDICINE

## 2024-08-28 PROCEDURE — 1200000003 HC TELEMETRY R&B

## 2024-08-28 PROCEDURE — 2580000003 HC RX 258: Performed by: PHYSICIAN ASSISTANT

## 2024-08-28 PROCEDURE — B24BZZ4 ULTRASONOGRAPHY OF HEART WITH AORTA, TRANSESOPHAGEAL: ICD-10-PCS | Performed by: INTERNAL MEDICINE

## 2024-08-28 PROCEDURE — 2500000003 HC RX 250 WO HCPCS: Performed by: PHYSICIAN ASSISTANT

## 2024-08-28 PROCEDURE — 93312 ECHO TRANSESOPHAGEAL: CPT

## 2024-08-28 PROCEDURE — 93010 ELECTROCARDIOGRAM REPORT: CPT | Performed by: NUCLEAR MEDICINE

## 2024-08-28 PROCEDURE — 99223 1ST HOSP IP/OBS HIGH 75: CPT | Performed by: INTERNAL MEDICINE

## 2024-08-28 PROCEDURE — 85027 COMPLETE CBC AUTOMATED: CPT

## 2024-08-28 PROCEDURE — 93005 ELECTROCARDIOGRAM TRACING: CPT

## 2024-08-28 PROCEDURE — 85025 COMPLETE CBC W/AUTO DIFF WBC: CPT

## 2024-08-28 PROCEDURE — 99152 MOD SED SAME PHYS/QHP 5/>YRS: CPT | Performed by: INTERNAL MEDICINE

## 2024-08-28 PROCEDURE — 93005 ELECTROCARDIOGRAM TRACING: CPT | Performed by: INTERNAL MEDICINE

## 2024-08-28 PROCEDURE — 84484 ASSAY OF TROPONIN QUANT: CPT

## 2024-08-28 PROCEDURE — 80048 BASIC METABOLIC PNL TOTAL CA: CPT

## 2024-08-28 PROCEDURE — 0202U NFCT DS 22 TRGT SARS-COV-2: CPT

## 2024-08-28 PROCEDURE — 87641 MR-STAPH DNA AMP PROBE: CPT

## 2024-08-28 PROCEDURE — 2500000003 HC RX 250 WO HCPCS: Performed by: INTERNAL MEDICINE

## 2024-08-28 PROCEDURE — 2580000003 HC RX 258

## 2024-08-28 PROCEDURE — 92960 CARDIOVERSION ELECTRIC EXT: CPT | Performed by: INTERNAL MEDICINE

## 2024-08-28 PROCEDURE — 93005 ELECTROCARDIOGRAM TRACING: CPT | Performed by: STUDENT IN AN ORGANIZED HEALTH CARE EDUCATION/TRAINING PROGRAM

## 2024-08-28 PROCEDURE — 93320 DOPPLER ECHO COMPLETE: CPT | Performed by: INTERNAL MEDICINE

## 2024-08-28 RX ORDER — POLYETHYLENE GLYCOL 3350 17 G/17G
17 POWDER, FOR SOLUTION ORAL DAILY PRN
Status: DISCONTINUED | OUTPATIENT
Start: 2024-08-28 | End: 2024-08-31 | Stop reason: HOSPADM

## 2024-08-28 RX ORDER — NALOXONE HYDROCHLORIDE 0.4 MG/ML
INJECTION, SOLUTION INTRAMUSCULAR; INTRAVENOUS; SUBCUTANEOUS PRN
Status: COMPLETED | OUTPATIENT
Start: 2024-08-28 | End: 2024-08-28

## 2024-08-28 RX ORDER — MAGNESIUM SULFATE IN WATER 40 MG/ML
2000 INJECTION, SOLUTION INTRAVENOUS PRN
Status: DISCONTINUED | OUTPATIENT
Start: 2024-08-28 | End: 2024-08-31 | Stop reason: HOSPADM

## 2024-08-28 RX ORDER — SODIUM CHLORIDE 0.9 % (FLUSH) 0.9 %
5-40 SYRINGE (ML) INJECTION EVERY 12 HOURS SCHEDULED
Status: DISCONTINUED | OUTPATIENT
Start: 2024-08-28 | End: 2024-08-31 | Stop reason: HOSPADM

## 2024-08-28 RX ORDER — ONDANSETRON 2 MG/ML
4 INJECTION INTRAMUSCULAR; INTRAVENOUS EVERY 6 HOURS PRN
Status: DISCONTINUED | OUTPATIENT
Start: 2024-08-28 | End: 2024-08-31 | Stop reason: HOSPADM

## 2024-08-28 RX ORDER — ATORVASTATIN CALCIUM 10 MG/1
10 TABLET, FILM COATED ORAL DAILY
Status: DISCONTINUED | OUTPATIENT
Start: 2024-08-28 | End: 2024-08-31 | Stop reason: HOSPADM

## 2024-08-28 RX ORDER — SODIUM CHLORIDE 9 MG/ML
INJECTION, SOLUTION INTRAVENOUS PRN
Status: DISCONTINUED | OUTPATIENT
Start: 2024-08-28 | End: 2024-08-31 | Stop reason: ALTCHOICE

## 2024-08-28 RX ORDER — ONDANSETRON 4 MG/1
4 TABLET, ORALLY DISINTEGRATING ORAL EVERY 8 HOURS PRN
Status: DISCONTINUED | OUTPATIENT
Start: 2024-08-28 | End: 2024-08-31 | Stop reason: HOSPADM

## 2024-08-28 RX ORDER — FLUMAZENIL 0.1 MG/ML
INJECTION INTRAVENOUS PRN
Status: COMPLETED | OUTPATIENT
Start: 2024-08-28 | End: 2024-08-28

## 2024-08-28 RX ORDER — DILTIAZEM HYDROCHLORIDE 5 MG/ML
10 INJECTION INTRAVENOUS ONCE
Status: COMPLETED | OUTPATIENT
Start: 2024-08-28 | End: 2024-08-28

## 2024-08-28 RX ORDER — DILTIAZEM HCL 60 MG
60 TABLET ORAL 2 TIMES DAILY
Status: DISCONTINUED | OUTPATIENT
Start: 2024-08-28 | End: 2024-08-29

## 2024-08-28 RX ORDER — CITALOPRAM HYDROBROMIDE 20 MG/1
10 TABLET ORAL DAILY
Status: DISCONTINUED | OUTPATIENT
Start: 2024-08-28 | End: 2024-08-29

## 2024-08-28 RX ORDER — MIDAZOLAM HYDROCHLORIDE 1 MG/ML
INJECTION INTRAMUSCULAR; INTRAVENOUS PRN
Status: COMPLETED | OUTPATIENT
Start: 2024-08-28 | End: 2024-08-28

## 2024-08-28 RX ORDER — SODIUM CHLORIDE 0.9 % (FLUSH) 0.9 %
5-40 SYRINGE (ML) INJECTION PRN
Status: DISCONTINUED | OUTPATIENT
Start: 2024-08-28 | End: 2024-08-31 | Stop reason: HOSPADM

## 2024-08-28 RX ORDER — POTASSIUM CHLORIDE 7.45 MG/ML
10 INJECTION INTRAVENOUS PRN
Status: DISCONTINUED | OUTPATIENT
Start: 2024-08-28 | End: 2024-08-31 | Stop reason: HOSPADM

## 2024-08-28 RX ORDER — POTASSIUM CHLORIDE 1500 MG/1
40 TABLET, EXTENDED RELEASE ORAL PRN
Status: DISCONTINUED | OUTPATIENT
Start: 2024-08-28 | End: 2024-08-31 | Stop reason: HOSPADM

## 2024-08-28 RX ORDER — ACETAMINOPHEN 650 MG/1
650 SUPPOSITORY RECTAL EVERY 6 HOURS PRN
Status: DISCONTINUED | OUTPATIENT
Start: 2024-08-28 | End: 2024-08-30 | Stop reason: SDUPTHER

## 2024-08-28 RX ORDER — ACETAMINOPHEN 325 MG/1
650 TABLET ORAL EVERY 6 HOURS PRN
Status: DISCONTINUED | OUTPATIENT
Start: 2024-08-28 | End: 2024-08-30 | Stop reason: SDUPTHER

## 2024-08-28 RX ORDER — LOSARTAN POTASSIUM 25 MG/1
25 TABLET ORAL DAILY
Status: DISCONTINUED | OUTPATIENT
Start: 2024-08-28 | End: 2024-08-31 | Stop reason: HOSPADM

## 2024-08-28 RX ORDER — FENTANYL CITRATE 50 UG/ML
INJECTION, SOLUTION INTRAMUSCULAR; INTRAVENOUS PRN
Status: COMPLETED | OUTPATIENT
Start: 2024-08-28 | End: 2024-08-28

## 2024-08-28 RX ADMIN — FLUMAZENIL 0.4 MG: 0.1 INJECTION INTRAVENOUS at 15:57

## 2024-08-28 RX ADMIN — MIDAZOLAM 2 MG: 1 INJECTION INTRAMUSCULAR; INTRAVENOUS at 15:46

## 2024-08-28 RX ADMIN — DILTIAZEM HYDROCHLORIDE 5 MG/HR: 5 INJECTION, SOLUTION INTRAVENOUS at 02:42

## 2024-08-28 RX ADMIN — FENTANYL CITRATE 25 MCG: 50 INJECTION, SOLUTION INTRAMUSCULAR; INTRAVENOUS at 15:53

## 2024-08-28 RX ADMIN — MIDAZOLAM 1 MG: 1 INJECTION INTRAMUSCULAR; INTRAVENOUS at 15:52

## 2024-08-28 RX ADMIN — DILTIAZEM HYDROCHLORIDE 60 MG: 60 TABLET ORAL at 20:23

## 2024-08-28 RX ADMIN — MIDAZOLAM 1 MG: 1 INJECTION INTRAMUSCULAR; INTRAVENOUS at 15:49

## 2024-08-28 RX ADMIN — NALOXONE HYDROCHLORIDE 0.4 MG: 0.4 INJECTION, SOLUTION INTRAMUSCULAR; INTRAVENOUS; SUBCUTANEOUS at 15:56

## 2024-08-28 RX ADMIN — DILTIAZEM HYDROCHLORIDE 10 MG: 5 INJECTION, SOLUTION INTRAVENOUS at 02:37

## 2024-08-28 RX ADMIN — MIDAZOLAM 0.5 MG: 1 INJECTION INTRAMUSCULAR; INTRAVENOUS at 15:53

## 2024-08-28 RX ADMIN — FENTANYL CITRATE 50 MCG: 50 INJECTION, SOLUTION INTRAMUSCULAR; INTRAVENOUS at 15:49

## 2024-08-28 RX ADMIN — RIVAROXABAN 20 MG: 20 TABLET, FILM COATED ORAL at 16:46

## 2024-08-28 RX ADMIN — ATORVASTATIN CALCIUM 10 MG: 10 TABLET, FILM COATED ORAL at 07:35

## 2024-08-28 RX ADMIN — FENTANYL CITRATE 50 MCG: 50 INJECTION, SOLUTION INTRAMUSCULAR; INTRAVENOUS at 15:46

## 2024-08-28 RX ADMIN — SODIUM CHLORIDE, PRESERVATIVE FREE 10 ML: 5 INJECTION INTRAVENOUS at 20:23

## 2024-08-28 RX ADMIN — CITALOPRAM HYDROBROMIDE 10 MG: 20 TABLET ORAL at 12:27

## 2024-08-28 RX ADMIN — POTASSIUM BICARBONATE 20 MEQ: 782 TABLET, EFFERVESCENT ORAL at 06:56

## 2024-08-28 ASSESSMENT — ENCOUNTER SYMPTOMS
NAUSEA: 0
RHINORRHEA: 0
COUGH: 0
ABDOMINAL PAIN: 0
PHOTOPHOBIA: 0
SHORTNESS OF BREATH: 0
VOMITING: 0
SORE THROAT: 0

## 2024-08-28 ASSESSMENT — PAIN - FUNCTIONAL ASSESSMENT
PAIN_FUNCTIONAL_ASSESSMENT: NONE - DENIES PAIN
PAIN_FUNCTIONAL_ASSESSMENT: 0-10
PAIN_FUNCTIONAL_ASSESSMENT: NONE - DENIES PAIN

## 2024-08-28 ASSESSMENT — PAIN DESCRIPTION - LOCATION: LOCATION: CHEST

## 2024-08-28 ASSESSMENT — PAIN SCALES - GENERAL
PAINLEVEL_OUTOF10: 1
PAINLEVEL_OUTOF10: 0

## 2024-08-28 NOTE — PLAN OF CARE
Name: Manuel Irby  YOB: 1970  MRN: [id number]  Date: 08/28/24     Plan of Care    Mr. Irby is a 53 yo male with pmhx of paroxysmal atrial fibrillation s/p ablation, nonobstructive CAD< HFrEF, h/o CVA, HLD, HTN that presented to [facility] for palpitations and tachycardia. He was found to be in Afib with RVR and Diltiazem drip was started with improvement in HR. The patient had taken his xarelto before coming to the ED, but had not been compliant on his medications up until symptoms. He has been going through a divorce and is neglecting his wellbeing. He was started on celexa and psychiatry was consulted. Cardiology was consulted and cardioverted the patient with return then to patient's oral diltiazem and xarelto would continue.     Atrial Fibrillation with RVR: 2/2 medication noncompliance and high caffeine intake. CHADSVASC 3. DOAC Xarelto. Rate controlled on diltiazem.   -Patient was cardioverted back to NSR rhythm today and patient was transitioned from diltiazem gtt to oral.   -Continue xarelto  -Maintain K >4 and Mg >2  -Cardiology consulted    Depression/Anxiety: Patient has been going through a divorce and feeling more depressed and anxious. He denies any suicidal thoughts, however has been noncompliant with medications he understands the importance of taking.   -Start Celexa  -Consult Psychiatry    Nonobstructive CAD: Last [facility] 2019  showed intermittent disease of LAD/diagonal. Stress test 2/27/24 was negative. EKG: no ischemic changes. Trop wnl.   -Continue ASA, statin, and diltiazem    HFrEF: stable. Echo 4/4/23 EF 40-45% with moderate global hypokinesis with systolic function reduced. GDMT: losartan, diltiazem. Patient does not take diuretics.   -Echo ordered    H/o CVA: on ASA  HLD: continue statin  HTN: continue losartan, diltiazem     General appearance: No apparent distress, appears stated age.   Eyes:  Pupils equal, round, and reactive to light.

## 2024-08-28 NOTE — PROCEDURES
PROCEDURE NOTE  Date: 8/28/2024   Name: Manuel Irby  YOB: 1970    Procedures  EKG completed, given to Carlee HICKS

## 2024-08-28 NOTE — ED PROVIDER NOTES
[facility] EMERGENCY DEPT      Pt Name: Manuel Irby  MRN: [id number]  Birthdate 1970  Date of evaluation: 8/28/2024  Provider: Oanh Ugalde PA-C    CHIEF COMPLAINT       Chief Complaint   Patient presents with    Tachycardia    Chest Pain       Nurses Notes reviewed and I agree except as noted in the HPI.      HISTORY OF PRESENT ILLNESS    Manuel Irby is a 54 y.o. male who presents from home driven by self for palpitations.  Patient explains that he is under a lot of stress going through a divorce.  He has not been sleeping well, only 3 to 4 hours per night.  Today he drank 2 Celsius due to fatigue.  At 1800 patient started to experience palpitations described as fluttering.  He has \"small chest pain\" and feels dizzy when bending over.  This feels like similar episodes of atrial fibrillation.  Patient admittedly stopped taking his medicines about 3 months ago because he felt fine.  At 1900 he took his Xarelto due to the palpitations.  Patient reports increased anxiety feeling and night sweats for the past couple weeks.  Patient denies dyspnea, nausea, vomiting, fever, chills, URI symptoms, or other complaints.  Patient denies smoking, drinks alcohol socially, and denies illicit drug use    REVIEW OF SYSTEMS     Review of Systems   Constitutional:  Positive for fatigue. Negative for appetite change, chills, diaphoresis and fever.   HENT:  Negative for congestion, rhinorrhea and sore throat.    Eyes:  Negative for photophobia.   Respiratory:  Negative for cough and shortness of breath.    Cardiovascular:  Positive for chest pain and palpitations. Negative for leg swelling.   Gastrointestinal:  Negative for abdominal pain, nausea and vomiting.   Musculoskeletal:  Negative for gait problem.   Skin:  Negative for rash.   Neurological:  Positive for dizziness. Negative for weakness and light-headedness.   Psychiatric/Behavioral:  Positive for sleep disturbance. The patient is nervous/anxious.

## 2024-08-28 NOTE — ED NOTES
Patient resting on cot. VS stable. Telemetry in place. Lights dimmed for comfort. Call light in reach. Patient denies needs at this time.

## 2024-08-28 NOTE — ED NOTES
ED to inpatient nurses report      Chief Complaint:  Chief Complaint   Patient presents with    Tachycardia    Chest Pain     Present to ED from: home    MOA:     LOC: alert and orientated to name, place, date  Mobility: Independent  Oxygen Baseline: room air     Current needs required: room air     Code Status:   Prior    What abnormal results were found and what did you give/do to treat them? diltiazem  Any procedures or intervention occur? NA    Mental Status:  Level of Consciousness: Alert (0)    Psych Assessment:        Vitals:  Patient Vitals for the past 24 hrs:   BP Temp Temp src Pulse Resp SpO2 Height Weight   08/28/24 0342 123/81 -- -- 82 16 97 % -- --   08/28/24 0254 118/65 -- -- 84 17 97 % -- --   08/28/24 0242 133/83 -- -- 95 24 97 % -- --   08/28/24 0150 (!) 141/97 98.6 °F (37 °C) Oral (!) 156 16 98 % 1.753 m (5' 9\") 102.5 kg (226 lb)        LDAs:   Peripheral IV 08/28/24 Right Antecubital (Active)   Site Assessment Clean, dry & intact 08/28/24 0342   Line Status Infusing 08/28/24 0342   Phlebitis Assessment No symptoms 08/28/24 0342   Infiltration Assessment 0 08/28/24 0342   Dressing Status Clean, dry & intact 08/28/24 0342       Ambulatory Status:  No data recorded    Diagnosis:  DISPOSITION Admitted 08/28/2024 04:22:54 AM   Final diagnoses:   Chest pain, unspecified type   Atrial fibrillation with RVR (HCC)   Chronic atrial fibrillation (HCC)        Consults:  None     Pain Score:  Pain Assessment  Pain Assessment: None - Denies Pain  Pain Level: 1  Pain Location: Chest    C-SSRS:   Risk of Suicide: No Risk    Sepsis Screening:       Prateek Fall Risk:       Swallow Screening        Preferred Language:   English      ALLERGIES     Albuterol    SURGICAL HISTORY       Past Surgical History:   Procedure Laterality Date    ABLATION OF DYSRHYTHMIC FOCUS      cyroablation    CARDIOVERSION  2019    LAPAROSCOPIC APPENDECTOMY N/A 5/31/2020    APPENDECTOMY LAPAROSCOPIC converted to open performed by Kian OLSEN  MD Umre at [facility] OR    TONSILLECTOMY      TONSILLECTOMY         PAST MEDICAL HISTORY       Past Medical History:   Diagnosis Date    Atrial fibrillation (HCC)     Cerebral artery occlusion with cerebral infarction (HCC)     TIA from afib    CHF (NYHA class III, ACC/AHA stage C) (HCC) 11/22/2016    Hyperlipidemia     Hypertension            Electronically signed by Bethany Abraham RN on 8/28/2024 at 4:27 AM

## 2024-08-28 NOTE — ED TRIAGE NOTES
Patient presents to ED from home with C/O possible afib and chest pain. Patient states around 1800 he believes he started going into afib. Patient states he began having chest pain at that time. Patient states he takes dilatiazem and xarelto but hasn't taken either for a couple months now. Patient VS obtained. Telemetry in place. EKG complete. IV access obtained.    Plan: Patient recommended to moisturize up to three times a day with non scented lotion: Cetaphil Detail Level: Zone Continue Regimen: Cephalexin 500mg- take one pill 4x a day until done. Initiate Treatment: Mupirocin 2%- apply to affected areas twice a day as needed.

## 2024-08-28 NOTE — CONSULTS
[facility]  Consult    Patient's Name/Date of Birth: Manuel Irby / 1970 (54 y.o.)    Date: August 28, 2024     Referring Provider: Shon Hand MD    CHIEF COMPLAINT:   Chief Complaint   Patient presents with    Tachycardia    Chest Pain         HPI: This is a pleasant 54 y.o. male with pmhx of PAF, non-obstructive CAD, HFrEF, hypertension hyperlipidemia, hx of CVA  who presents with for evaluation of palpitation and tachycardia.  Patient stated that he is going through a stressful time due to family related issue.  Patient stated that he has not taken his medication for the last 3-month due to the family related issues.  Endorsed fatigue and tiredness.  Patient also stated that he drank 2 Celsius drinks 3 energy drink the day before presentation of with palpitation and tachycardia.  Currently asymptomatic. no shortness of breath chest pain diaphoresis nausea or vomiting.    Cardiology was consulted for A-fib with RVR in a setting of known PAF.    Initial EKG showed A-fib RVR but RVR resolved on repeat EKG  Meds at home: Takes Cardizem, Xarelto  Troponin negative, <6  Vitals wnl.    Stress test on 2/2024: Negative for myocardial infarct.  Poststress ejection fraction was 45%.  No evidence of inducible ischemia.       Echo: ECHO from 4/2023 showed: EF of 40-45%.  Normal ventricle size.  Normal left ventricular wall thickening.  Moderate global hypokinesis of the left ventricle.  Moderate reduced systolic function.  Mildly dilated left atrium.       All labs, EKG's, diagnostic testing and images as well as cardiac cath, stress testing were reviewed during this encounter    Past Medical History:   Diagnosis Date    Atrial fibrillation (HCC)     Cerebral artery occlusion with cerebral infarction (HCC)     TIA from afib    CHF (NYHA class III, ACC/AHA stage C) (HCC) 11/22/2016    Hyperlipidemia     Hypertension      Past Surgical History:   Procedure Laterality Date    ABLATION OF  TSH 2.350 08/28/2024 05:37 AM     UA:   Lab Results   Component Value Date/Time    COLORU YELLOW 08/28/2024 07:00 AM    PHUR 7.5 08/28/2024 07:00 AM    PHUR 5.0 05/31/2020 02:10 PM    LABCAST NONE SEEN 08/28/2024 07:00 AM    LABCAST NONE SEEN 08/28/2024 07:00 AM    WBCUA 0-2 08/28/2024 07:00 AM    RBCUA NONE SEEN 08/28/2024 07:00 AM    YEAST NONE SEEN 08/28/2024 07:00 AM    BACTERIA NONE SEEN 08/28/2024 07:00 AM    LEUKOCYTESUR NEGATIVE 08/28/2024 07:00 AM    UROBILINOGEN 0.2 08/28/2024 07:00 AM    BILIRUBINUR NEGATIVE 08/28/2024 07:00 AM    BLOODU NEGATIVE 08/28/2024 07:00 AM    GLUCOSEU NEGATIVE 08/28/2024 07:00 AM         Physical Exam:  Vitals:    08/28/24 0730   BP: 122/81   Pulse: 85   Resp: 18   Temp: 98.2 °F (36.8 °C)   SpO2: 98%      Intake/Output Summary (Last 24 hours) at 8/28/2024 0816  Last data filed at 8/28/2024 0519  Gross per 24 hour   Intake 15.56 ml   Output --   Net 15.56 ml      General:  No acute distress  Neck: Supple, no JVD, no carotid bruits  Heart: Regular rhythm normal S1 and S2, no rubs, murmurs or gallops  Lungs: clear to ascultation no rales, wheezes, or rhonchi  Abdomen: positive bowel sounds, soft, non-tender, non-distended, no bruits, no masses  Extremities:no clubbing, cyanosis or edema  Neurologic: alert and oriented x 3, cranial nerves 2-12 grossly intact, motor and sensory intact, moving all extremities  Skin: No rashes  Psych: AO x 3, no depression/oskar, no pressured speech, normal affect  Lymph: No obvious LAD      Assessment and Plan  Atrial fib with RVR  Hx of PAF.   EKG on 8/28 showed A-fib with RVR. Repeat EKG showed a-fib with no RVR. Currently on NSR on tele. On ASA and Xarelto at home. Rate control at home: Diltiazem 60 mg bid . Cryoablation for a-fib in 2019. Multiple H/o of MILAD-DCCV, the last one on 5/272022.  MILAD-DCCV.   -ECHO ordered. F/u results  - Will get 12 lead EKG  - Cont Cardizem gtt.  -Maintain potassium > 4  -Maintain magnesium > 2  Non-obstruct CAD  Last  LHC from 7/2019 showed moderate disease of the mid LAD diagonal artery exhibited about 70% narrowing at the ostium.  Currently no chest pain, trop negative. No ST changes on EKG  At this time, pt is unlikely to be experiencing ACS. No further work up.  Pt symptoms most likely secondary to non-cardiac stress given his family issue and medication non-compliancy.  Recommend home medication continuation and stop Cardizem gtt  HFrEF  Needs better GDMT  Last echo from 4/2023 HFrEF showed EF of 40-45%. Will get another ECHO  Cont home meds  HTN  Home med include losartan and Cardizem  Was started Cardizem gtt and home Cardizem held.  Thank you for allowing us to participate in the care of this patient.  Please do not hesitate to call us with questions.    Electronically signed by Avinash Hou MD, PGY-1 IM Resident on 8/28/2024 at 8:16 AM    Attending Supervising Physician's Attestation Statement  I performed a history and physical examination on the patient and discussed the management with the resident physician. I reviewed and agree with the findings and plan as documented in the resident's note except for as noted below.  Afib RVR related to caffeine intact.  ECG shows Afib RVR that went to controlled rate, Feels better.  Has been going through divorce and not taking his meds x 3 months.  Check TTE.  Needs GDMT for CHF.  Stress negative for ischemia.  Continue Xarelto and proceed GDMT as outpatient. Has had ablation in the past.  Will need MILAD-DCCV, and needs to comply.  NPO.  R/B/A of the procedure discussed and he wants to proceed. Stress reduction therapies. Further recommendations based on results and clinical course.     Time spent reviewing notes, data, discussing with patient/family, and formulating plan with clinical documentation was approximately 80 minutes.      Electronically signed by Jamir Dennis MD on 8/28/24 at 10:30 AM EDT  Interventional Cardiology - [facility]

## 2024-08-28 NOTE — PROCEDURES
PROCEDURE NOTE  Date: 8/28/2024   Name: Manuel Irby  YOB: 1970    Procedures      EKG was completed and handed to RN

## 2024-08-28 NOTE — H&P
[facility]  Sedation/Analgesia History & Physical    Pt Name: Manuel Irby  Account number:   MRN: [id number]  YOB: 1970  Provider Performing Procedure: Jamir Dennis MD MD  Referring Provider: Jamir Dennis MD   Primary Care Physician: Brown Urbina,   Date: 8/28/2024    PRE-PROCEDURE    Code Status: FULL CODE  Brief History/Pre-Procedure Diagnosis:   Afib RVR    Consent: : I have discussed with the patient risks, benefits, and alternatives (and relevant risks, benefits, and side effects related to alternatives or not receiving care), and likelihood of the success.   The patient and/or representative appear to understand and agree to proceed.  The discussion encompasses risks, benefits, and side effects related to the alternatives and the risks related to not receiving the proposed care, treatment, and services.     The indication, risks and benefits of the procedure and possible therapeutic consequences and alternatives were discussed with the patient. The patient was given the opportunity to ask questions and to have them answered to his/her satisfaction. Risks of the procedure include but are not limited to the following: Bleeding, hematoma including retroperitoneal hemmorhage, infection, pain and discomfort, injury to the aorta and other blood vessels, rhythm disturbance, low blood pressure, myocardial infarction, stroke, kidney damage/failure, myocardial perforation, allergic reactions to sedatives/contrast material, loss of pulse/vascular compromise requiring surgery, aneurysm/pseudoaneurysm formation, possible loss of a limb/hand/leg, needing blood transfusion, requiring emergent open heart surgery or emergent coronary intervention, the need for intubation/respiratory support, the requirement for defibrillation/cardioversion, and death. Alternatives to and omission of the suggested procedure were discussed. The patient had no further questions and wished to

## 2024-08-28 NOTE — H&P
History & Physical  Internal Medicine Resident         Patient: Manuel Irby 54 y.o. male      : 1970  Date of Admission: 2024  Date of Service: Pt seen/examined on 24 and Admitted to Inpatient with expected LOS greater than two midnights due to medical therapy.     ASSESSMENT AND PLAN  Atrial fibrillation w/ RVR: POA.  Likely secondary to medication noncompliance and high caffeine intake with drinking 2 Celsius's before symptoms started. [id number] 3. DOAC Xarelto.  Rate control, Cardizem.  Hx paroxysmal atrial fibrillation.  Had not taken home medications for 2 to 3 months.  Prior to arrival to ED patient felt palpitations and his A-fib, took Xarelto before coming to ED.  EKG on presentation showed rate 147.  Started on Cardizem gtt. in ED. Denies recent infection. Recent life stressors with going through divorce.  Initial troponin WNL.  proBNP 847.  Hx prior atrial fibrillation ablation.  History of prior cardioversion   - Continue Cardizem gtt.  Home Cardizem p.o. held this time  - Continue home Xarelto  - Complete echo ordered for further structural evaluation  - Thyroid studies, UA, infectious workup ordered  - Replete electrolytes, goal potassium >4.0 and magnesium >2.0  - On telemetry  Nonobstructive CAD: [facility], 2019 showed intermittent disease of LAD/diagonal.  Stress test 2024 was negative.  Denies chest pain, chest pressure or anginal equivalent.  No CP on arrival states he was having palpitations.  No acute ST changes seen on EKG.  Initial troponin WNL.  Low suspicion ACS.  Will repeat troponin and continue to monitor  - On telemetry  - Continue home medications, ASA and statin  - If patient develops chest pain repeat EKG and obtain repeat troponin  HFrEF: No exacerbation.  Asymptomatic.  No increased oxygen requirements or new onset swelling.  Echo 23 showed EF 40-45% W/moderate global hypokinesis.  Systolic function was moderately reduced.  GDMT, losartan.  Not on  cyroablation    CARDIOVERSION  2019    LAPAROSCOPIC APPENDECTOMY N/A 2020    APPENDECTOMY LAPAROSCOPIC converted to open performed by Kian Owusu MD at [facility] OR    TONSILLECTOMY      TONSILLECTOMY           Problem Relation Age of Onset    Heart Disease Father     Cancer Maternal Aunt     Heart Failure Maternal Grandmother     Heart Disease Mother         atrial fib    No Known Problems Brother     No Known Problems Brother     No Known Problems Brother     No Known Problems Brother     Diabetes Paternal Aunt      Social History     Socioeconomic History    Marital status:      Spouse name: Nay    Number of children: 6    Years of education: None    Highest education level: None   Tobacco Use    Smoking status: Former     Current packs/day: 0.00     Average packs/day: 0.5 packs/day for 4.0 years (2.0 ttl pk-yrs)     Types: Cigarettes     Start date:      Quit date:      Years since quittin.6    Smokeless tobacco: Never   Vaping Use    Vaping status: Never Used   Substance and Sexual Activity    Alcohol use: Yes     Alcohol/week: 12.0 standard drinks of alcohol     Types: 12 Cans of beer per week     Comment: SOCIALLY    Drug use: Not Currently     Comment: hx of     Sexual activity: Yes     Partners: Female     Social Determinants of Health     Financial Resource Strain: Unknown (2022)    Overall Financial Resource Strain (CARDIA)     Difficulty of Paying Living Expenses: Patient declined   Food Insecurity: Unknown (2022)    Hunger Vital Sign     Worried About Running Out of Food in the Last Year: Patient declined     Ran Out of Food in the Last Year: Patient declined      Code status: Prior     Electronically signed by Brown Hurt,  PGY-3 on 2024 at 5:06 AM.   Case was discussed with the Attending, Dr. Shon Hand MD.

## 2024-08-28 NOTE — CARE COORDINATION
Case Management Assessment Initial Evaluation    Date/Time of Evaluation: 8/28/2024 9:43 AM  Assessment Completed by: Manoj Cordon RN    If patient is discharged prior to next notation, then this note serves as note for discharge by case management.    Patient Name: Manuel Irby                   YOB: 1970  Diagnosis: Chronic atrial fibrillation (HCC) [I48.20]  Atrial fibrillation with rapid ventricular response (HCC) [I48.91]  Atrial fibrillation with RVR (HCC) [I48.91]  Chest pain, unspecified type [R07.9]                   Date / Time: 8/28/2024  1:44 AM  [facility]     Patient Admission Status: Inpatient   Readmission Risk Low 0-14, Mod 15-19), High > 20: Readmission Risk Score: 6.7    Current PCP: Brown Urbina DO  Health Care Decision Makers:   Primary Decision Maker: LORNA IRBY [facility] - [phone]    Additional Case Management Notes:   A-fib  History: CHF, CVA  NPO  Cardizem Gtt    Procedures:   8/28 ECHO pending    Patient Goals/Plan/Treatment Preferences: plans home alone, still drives           08/28/24 0941   Service Assessment   Patient Orientation Alert and Oriented   Cognition Alert   History Provided By Patient;Medical Record   Primary Caregiver Self   Accompanied By/Relationship none   Support Systems Family Members   Patient's Healthcare Decision Maker is: Legal Next of Kin   PCP Verified by CM Yes   Last Visit to PCP Within last year   Prior Functional Level Independent in ADLs/IADLs   Current Functional Level Independent in ADLs/IADLs   Can patient return to prior living arrangement Yes   Ability to make needs known: Good   Family able to assist with home care needs: No   Would you like for me to discuss the discharge plan with any other family members/significant others, and if so, who? No   Financial Resources Other (Comment)  (MMO)   Community Resources None   CM/SW Referral ADLs/IADLs   Social/Functional History   Lives With Alone   Type of  Home House   Home Layout Two level   Active  Yes   Discharge Planning   Type of Residence House   Living Arrangements Family Members;Children   Current Services Prior To Admission None   Potential Assistance Needed N/A   DME Ordered? No   Potential Assistance Purchasing Medications No   Type of Home Care Services None   Patient expects to be discharged to: House   Follow Up Appointment: Best Day/Time    (pm)   One/Two Story Residence Two story   Lift Chair Available No   History of falls? 0   Services At/After Discharge   Transition of Care Consult (CM Consult) Discharge Planning   Services At/After Discharge None   [place] Resource Information Provided? No   Mode of Transport at Discharge Other (see comment)  (family)   Confirm Follow Up Transport Family   Condition of Participation: Discharge Planning   The Plan for Transition of Care is related to the following treatment goals: A-fib Treatment   Freedom of Choice list was provided with basic dialogue that supports the patient's individualized plan of care/goals, treatment preferences, and shares the quality data associated with the providers?  No

## 2024-08-28 NOTE — PLAN OF CARE
Problem: Discharge Planning  Goal: Discharge to home or other facility with appropriate resources  Outcome: Progressing  Flowsheets (Taken 8/28/2024 0545)  Discharge to home or other facility with appropriate resources:   Identify barriers to discharge with patient and caregiver   Arrange for needed discharge resources and transportation as appropriate   Refer to discharge planning if patient needs post-hospital services based on physician order or complex needs related to functional status, cognitive ability or social support system   Identify discharge learning needs (meds, wound care, etc)     Problem: ABCDS Injury Assessment  Goal: Absence of physical injury  Outcome: Progressing  Flowsheets (Taken 8/28/2024 0545)  Absence of Physical Injury: Implement safety measures based on patient assessment  Note: Bed in low position  Call light in reach  Bed wheel lock  Teaching to use call light for assistance.       Problem: Cardiovascular - Adult  Goal: Maintains optimal cardiac output and hemodynamic stability  Outcome: Progressing  Flowsheets (Taken 8/28/2024 0545)  Maintains optimal cardiac output and hemodynamic stability:   Monitor blood pressure and heart rate   Monitor urine output and notify Licensed Independent Practitioner for values outside of normal range   Assess for signs of decreased cardiac output   Administer vasoactive medications as ordered   Administer fluid and/or volume expanders as ordered  Note: Maintain adequate blood pressure and heart rate to achieve effective cardiac output   Goal: Absence of cardiac dysrhythmias or at baseline  Outcome: Progressing  Flowsheets (Taken 8/28/2024 0545)  Absence of cardiac dysrhythmias or at baseline:   Monitor cardiac rate and rhythm   Assess for signs of decreased cardiac output   Administer antiarrhythmia medication and electrolyte replacement as ordered     Problem: Skin/Tissue Integrity - Adult  Goal: Skin integrity remains intact  Outcome:

## 2024-08-29 LAB
ANION GAP SERPL CALC-SCNC: 11 MEQ/L (ref 8–16)
APTT PPP: 35.6 SECONDS (ref 22–38)
APTT PPP: 41.9 SECONDS (ref 22–38)
BUN SERPL-MCNC: 10 MG/DL (ref 7–22)
CA-I BLD ISE-SCNC: 1.08 MMOL/L (ref 1.12–1.32)
CALCIUM SERPL-MCNC: 8.4 MG/DL (ref 8.5–10.5)
CHLORIDE SERPL-SCNC: 103 MEQ/L (ref 98–111)
CO2 SERPL-SCNC: 23 MEQ/L (ref 23–33)
CREAT SERPL-MCNC: 0.9 MG/DL (ref 0.4–1.2)
DEPRECATED RDW RBC AUTO: 41 FL (ref 35–45)
DEPRECATED RDW RBC AUTO: 42 FL (ref 35–45)
EKG ATRIAL RATE: 83 BPM
EKG P AXIS: 52 DEGREES
EKG P-R INTERVAL: 158 MS
EKG Q-T INTERVAL: 392 MS
EKG QRS DURATION: 76 MS
EKG QTC CALCULATION (BAZETT): 460 MS
EKG R AXIS: -15 DEGREES
EKG T AXIS: 7 DEGREES
EKG VENTRICULAR RATE: 83 BPM
ERYTHROCYTE [DISTWIDTH] IN BLOOD BY AUTOMATED COUNT: 12.4 % (ref 11.5–14.5)
ERYTHROCYTE [DISTWIDTH] IN BLOOD BY AUTOMATED COUNT: 12.6 % (ref 11.5–14.5)
GFR SERPL CREATININE-BSD FRML MDRD: > 90 ML/MIN/1.73M2
GLUCOSE SERPL-MCNC: 99 MG/DL (ref 70–108)
HCT VFR BLD AUTO: 40.1 % (ref 42–52)
HCT VFR BLD AUTO: 42.8 % (ref 42–52)
HGB BLD-MCNC: 13.7 GM/DL (ref 14–18)
HGB BLD-MCNC: 14.5 GM/DL (ref 14–18)
INR PPP: 1.27 (ref 0.85–1.13)
MAGNESIUM SERPL-MCNC: 2.4 MG/DL (ref 1.6–2.4)
MCH RBC QN AUTO: 30.9 PG (ref 26–33)
MCH RBC QN AUTO: 31.3 PG (ref 26–33)
MCHC RBC AUTO-ENTMCNC: 33.9 GM/DL (ref 32.2–35.5)
MCHC RBC AUTO-ENTMCNC: 34.2 GM/DL (ref 32.2–35.5)
MCV RBC AUTO: 91.3 FL (ref 80–94)
MCV RBC AUTO: 91.6 FL (ref 80–94)
PLATELET # BLD AUTO: 224 THOU/MM3 (ref 130–400)
PLATELET # BLD AUTO: 248 THOU/MM3 (ref 130–400)
PMV BLD AUTO: 10.3 FL (ref 9.4–12.4)
PMV BLD AUTO: 10.4 FL (ref 9.4–12.4)
POTASSIUM SERPL-SCNC: 3.9 MEQ/L (ref 3.5–5.2)
RBC # BLD AUTO: 4.38 MILL/MM3 (ref 4.7–6.1)
RBC # BLD AUTO: 4.69 MILL/MM3 (ref 4.7–6.1)
SODIUM SERPL-SCNC: 137 MEQ/L (ref 135–145)
WBC # BLD AUTO: 6.8 THOU/MM3 (ref 4.8–10.8)
WBC # BLD AUTO: 7.5 THOU/MM3 (ref 4.8–10.8)

## 2024-08-29 PROCEDURE — 6370000000 HC RX 637 (ALT 250 FOR IP)

## 2024-08-29 PROCEDURE — 6360000002 HC RX W HCPCS

## 2024-08-29 PROCEDURE — 85027 COMPLETE CBC AUTOMATED: CPT

## 2024-08-29 PROCEDURE — 85610 PROTHROMBIN TIME: CPT

## 2024-08-29 PROCEDURE — 99233 SBSQ HOSP IP/OBS HIGH 50: CPT | Performed by: INTERNAL MEDICINE

## 2024-08-29 PROCEDURE — 85730 THROMBOPLASTIN TIME PARTIAL: CPT

## 2024-08-29 PROCEDURE — 80048 BASIC METABOLIC PNL TOTAL CA: CPT

## 2024-08-29 PROCEDURE — 36415 COLL VENOUS BLD VENIPUNCTURE: CPT

## 2024-08-29 PROCEDURE — 1200000003 HC TELEMETRY R&B

## 2024-08-29 PROCEDURE — 6360000002 HC RX W HCPCS: Performed by: PHYSICIAN ASSISTANT

## 2024-08-29 PROCEDURE — 2580000003 HC RX 258

## 2024-08-29 PROCEDURE — 90792 PSYCH DIAG EVAL W/MED SRVCS: CPT | Performed by: PSYCHIATRY & NEUROLOGY

## 2024-08-29 PROCEDURE — 83735 ASSAY OF MAGNESIUM: CPT

## 2024-08-29 PROCEDURE — 93010 ELECTROCARDIOGRAM REPORT: CPT | Performed by: NUCLEAR MEDICINE

## 2024-08-29 PROCEDURE — 6370000000 HC RX 637 (ALT 250 FOR IP): Performed by: PHYSICIAN ASSISTANT

## 2024-08-29 PROCEDURE — 82330 ASSAY OF CALCIUM: CPT

## 2024-08-29 PROCEDURE — 93005 ELECTROCARDIOGRAM TRACING: CPT

## 2024-08-29 PROCEDURE — 99232 SBSQ HOSP IP/OBS MODERATE 35: CPT | Performed by: PHYSICIAN ASSISTANT

## 2024-08-29 RX ORDER — NITROGLYCERIN 0.4 MG/1
0.4 TABLET SUBLINGUAL EVERY 5 MIN PRN
Status: ACTIVE | OUTPATIENT
Start: 2024-08-29 | End: 2024-08-29

## 2024-08-29 RX ORDER — HEPARIN SODIUM 10000 [USP'U]/100ML
5-30 INJECTION, SOLUTION INTRAVENOUS CONTINUOUS
Status: DISCONTINUED | OUTPATIENT
Start: 2024-08-29 | End: 2024-08-31

## 2024-08-29 RX ORDER — HEPARIN SODIUM 1000 [USP'U]/ML
2000 INJECTION, SOLUTION INTRAVENOUS; SUBCUTANEOUS PRN
Status: DISCONTINUED | OUTPATIENT
Start: 2024-08-29 | End: 2024-08-31 | Stop reason: HOSPADM

## 2024-08-29 RX ORDER — HEPARIN SODIUM 1000 [USP'U]/ML
4000 INJECTION, SOLUTION INTRAVENOUS; SUBCUTANEOUS PRN
Status: DISCONTINUED | OUTPATIENT
Start: 2024-08-29 | End: 2024-08-31 | Stop reason: HOSPADM

## 2024-08-29 RX ORDER — ATROPINE SULFATE 0.1 MG/ML
0.5 INJECTION INTRAVENOUS EVERY 5 MIN PRN
Status: ACTIVE | OUTPATIENT
Start: 2024-08-29 | End: 2024-08-29

## 2024-08-29 RX ORDER — REGADENOSON 0.08 MG/ML
0.4 INJECTION, SOLUTION INTRAVENOUS
Status: DISCONTINUED | OUTPATIENT
Start: 2024-08-29 | End: 2024-08-31 | Stop reason: HOSPADM

## 2024-08-29 RX ORDER — SODIUM CHLORIDE 9 MG/ML
500 INJECTION, SOLUTION INTRAVENOUS CONTINUOUS PRN
Status: ACTIVE | OUTPATIENT
Start: 2024-08-29 | End: 2024-08-29

## 2024-08-29 RX ORDER — METOPROLOL SUCCINATE 25 MG/1
25 TABLET, EXTENDED RELEASE ORAL DAILY
Status: DISCONTINUED | OUTPATIENT
Start: 2024-08-29 | End: 2024-08-31 | Stop reason: HOSPADM

## 2024-08-29 RX ORDER — SODIUM CHLORIDE 0.9 % (FLUSH) 0.9 %
5-40 SYRINGE (ML) INJECTION PRN
Status: ACTIVE | OUTPATIENT
Start: 2024-08-29 | End: 2024-08-29

## 2024-08-29 RX ORDER — MIRTAZAPINE 15 MG/1
15 TABLET, FILM COATED ORAL NIGHTLY
Status: DISCONTINUED | OUTPATIENT
Start: 2024-08-29 | End: 2024-08-31 | Stop reason: HOSPADM

## 2024-08-29 RX ORDER — AMINOPHYLLINE 25 MG/ML
50 INJECTION, SOLUTION INTRAVENOUS PRN
Status: ACTIVE | OUTPATIENT
Start: 2024-08-29 | End: 2024-08-29

## 2024-08-29 RX ORDER — CALCIUM GLUCONATE 20 MG/ML
2000 INJECTION, SOLUTION INTRAVENOUS ONCE
Status: COMPLETED | OUTPATIENT
Start: 2024-08-29 | End: 2024-08-29

## 2024-08-29 RX ORDER — POTASSIUM CHLORIDE 1500 MG/1
20 TABLET, EXTENDED RELEASE ORAL ONCE
Status: COMPLETED | OUTPATIENT
Start: 2024-08-29 | End: 2024-08-29

## 2024-08-29 RX ORDER — METOPROLOL TARTRATE 1 MG/ML
5 INJECTION, SOLUTION INTRAVENOUS EVERY 5 MIN PRN
Status: ACTIVE | OUTPATIENT
Start: 2024-08-29 | End: 2024-08-29

## 2024-08-29 RX ADMIN — SODIUM CHLORIDE, PRESERVATIVE FREE 10 ML: 5 INJECTION INTRAVENOUS at 08:33

## 2024-08-29 RX ADMIN — MIRTAZAPINE 15 MG: 15 TABLET, FILM COATED ORAL at 19:54

## 2024-08-29 RX ADMIN — CITALOPRAM HYDROBROMIDE 10 MG: 20 TABLET ORAL at 08:32

## 2024-08-29 RX ADMIN — HEPARIN SODIUM 9 UNITS/KG/HR: 10000 INJECTION, SOLUTION INTRAVENOUS at 16:57

## 2024-08-29 RX ADMIN — POTASSIUM CHLORIDE 20 MEQ: 1500 TABLET, EXTENDED RELEASE ORAL at 08:32

## 2024-08-29 RX ADMIN — LOSARTAN POTASSIUM 25 MG: 25 TABLET, FILM COATED ORAL at 08:32

## 2024-08-29 RX ADMIN — ATORVASTATIN CALCIUM 10 MG: 10 TABLET, FILM COATED ORAL at 08:32

## 2024-08-29 RX ADMIN — CALCIUM GLUCONATE 2000 MG: 20 INJECTION, SOLUTION INTRAVENOUS at 11:32

## 2024-08-29 RX ADMIN — DILTIAZEM HYDROCHLORIDE 60 MG: 60 TABLET ORAL at 08:32

## 2024-08-29 RX ADMIN — METOPROLOL SUCCINATE 25 MG: 25 TABLET, FILM COATED, EXTENDED RELEASE ORAL at 13:06

## 2024-08-29 NOTE — CONSULTS
Department of Psychiatry   Psychiatric Assessment      Thank you very much for allowing us to participate in the care of this patient.      Reason for Consult: Depression    HISTORY OF PRESENT ILLNESS:          The patient is a 54 y.o. male with significant history of atrial fibrillation s/p ablation, nonobstructive CAD< HFrEF, h/o CVA, HLD, HTN who is admitted medically for atrial fibrillation with RVR. Patient has no significant psychiatric history.    Patient not currently taking any of his heart medications because he \"feels well\" and \"was being my own doctor\".    Patient states that he is currently going through a divorce and that his father passed away in May and he has a daughter who is incarcerated. Also states having a difficult time caring for his children, including a son with neurological deficits and cortical blindness. Anxiety about losing his job is also contributing to his current state.    PSYCHIATRIC HISTORY:  Patient denies previous suicide attempt and admissions for psychiatric reasons. Not currently following with an outpatient psychiatric provider.    Past psychiatric medications includes:   Hydroxyzine 25 mg PO TID    Adverse reactions from psychotropic medications:  Denies    Lifetime Psychiatric Review of Systems        Obsessions and Compulsions: Denies       Katja or Hypomania: Denies     Hallucinations: Denies     Panic Attacks:  Denies     Delusions:  Denies     Phobias:  Denies     Trauma: Denies    Prior to Admission medications    Medication Sig Start Date End Date Taking? Authorizing Provider   aspirin 81 MG EC tablet Take 1 tablet by mouth daily 2/27/24  Yes Mukesh Martin MD   atorvastatin (LIPITOR) 10 MG tablet Take 1 tablet by mouth daily 2/27/24  Yes Mukesh Martin MD   losartan (COZAAR) 25 MG tablet Take 1 tablet by mouth daily 3/14/23  Yes Jessica Kaye APRN - CNP   hydrOXYzine pamoate (VISTARIL) 25 MG capsule Take 1 capsule by mouth 3 times daily as needed for

## 2024-08-29 NOTE — PROGRESS NOTES
Cardiology Progress Note      Patient:  Manuel Irby  YOB: 1970  MRN: [id number]   Acct: [id number]  Admit Date:  8/28/2024  Primary Cardiologist:  dr dover    Note per dr jacobson \"CHIEF COMPLAINT:       Chief Complaint   Patient presents with    Tachycardia    Chest Pain            HPI: This is a pleasant 54 y.o. male with pmhx of PAF, non-obstructive CAD, HFrEF, hypertension hyperlipidemia, hx of CVA  who presents with for evaluation of palpitation and tachycardia.  Patient stated that he is going through a stressful time due to family related issue.  Patient stated that he has not taken his medication for the last 3-month due to the family related issues.  Endorsed fatigue and tiredness.  Patient also stated that he drank 2 Celsius drinks 3 energy drink the day before presentation of with palpitation and tachycardia.  Currently asymptomatic. no shortness of breath chest pain diaphoresis nausea or vomiting.     Cardiology was consulted for A-fib with RVR in a setting of known PAF.     Initial EKG showed A-fib RVR but RVR resolved on repeat EKG  Meds at home: Takes Cardizem, Xarelto  Troponin negative, <6  Vitals wnl.\"    Subjective (Events in last 24 hours): pt awake and alert.  NAD. No cp or sob. No edema or orthopnea      Objective:   /87   Pulse 88   Temp 98.6 °F (37 °C) (Oral)   Resp 18   Ht 1.753 m (5' 9\")   Wt 105.8 kg (233 lb 4.8 oz)   SpO2 96%   BMI 34.45 kg/m²        TELEMETRY: off tele    Physical Exam:  General Appearance: alert and oriented to person, place and time, in no acute distress  Cardiovascular: normal rate, regular rhythm, normal S1 and S2, no murmurs, rubs, clicks, or gallops, distal pulses intact, no carotid bruits, no JVD  Pulmonary/Chest: clear to auscultation bilaterally- no wheezes, rales or rhonchi, normal air movement, no respiratory distress  Abdomen: soft, non-tender, non-distended, normal bowel sounds, no masses Extremities: no cyanosis, clubbing or  edema, pulse   Skin: warm and dry, no rash or erythema  Head: normocephalic and atraumatic  Eyes: pupils equal, round, and reactive to light  Neck: supple and non-tender without mass, no thyromegaly   Musculoskeletal: normal range of motion, no joint swelling, deformity or tenderness  Neurological: alert, oriented, normal speech, no focal findings or movement disorder noted    Medications:    calcium gluconate  2,000 mg IntraVENous Once    atorvastatin  10 mg Oral Daily    dilTIAZem  60 mg Oral BID    losartan  25 mg Oral Daily    rivaroxaban  20 mg Oral Dinner    sodium chloride flush  5-40 mL IntraVENous 2 times per day    citalopram  10 mg Oral Daily      sodium chloride      sodium chloride       regadenoson, 0.4 mg, ONCE PRN  sodium chloride flush, 5-40 mL, PRN  sodium chloride, 500 mL, Continuous PRN  atropine, 0.5 mg, Q5 Min PRN  nitroGLYCERIN, 0.4 mg, Q5 Min PRN  metoprolol, 5 mg, Q5 Min PRN  aminophylline, 50 mg, PRN  sodium chloride flush, 5-40 mL, PRN  sodium chloride, , PRN  potassium chloride, 40 mEq, PRN   Or  potassium alternative oral replacement, 40 mEq, PRN   Or  potassium chloride, 10 mEq, PRN  magnesium sulfate, 2,000 mg, PRN  ondansetron, 4 mg, Q8H PRN   Or  ondansetron, 4 mg, Q6H PRN  polyethylene glycol, 17 g, Daily PRN  acetaminophen, 650 mg, Q6H PRN   Or  acetaminophen, 650 mg, Q6H PRN        Diagnostics:  MILAD 8/28/24    Left Ventricle: Severely reduced left ventricular systolic function with a visually estimated EF of 25 - 30%. Left ventricle is severely dilated. Normal wall thickness. Severe global hypokinesis present.    Image quality is adequate.    Successful DCCV x 1 with conversion to NSR      Lab Data:    Cardiac Enzymes:  No results for input(s): \"CKTOTAL\", \"CKMB\", \"CKMBINDEX\", \"TROPONINI\" in the last 72 hours.    CBC:   Lab Results   Component Value Date/Time    WBC 6.8 08/29/2024 04:25 AM    RBC 4.38 08/29/2024 04:25 AM    HGB 13.7 08/29/2024 04:25 AM    HCT 40.1 08/29/2024 04:25  AM     08/29/2024 04:25 AM       CMP:    Lab Results   Component Value Date/Time     08/29/2024 04:25 AM    K 3.9 08/29/2024 04:25 AM    K 3.9 08/28/2024 01:50 AM     08/29/2024 04:25 AM    CO2 23 08/29/2024 04:25 AM    BUN 10 08/29/2024 04:25 AM    CREATININE 0.9 08/29/2024 04:25 AM    LABGLOM > 90 08/29/2024 04:25 AM    LABGLOM >60 02/26/2024 02:31 PM    GLUCOSE 99 08/29/2024 04:25 AM    CALCIUM 8.4 08/29/2024 04:25 AM       Hepatic Function Panel:    Lab Results   Component Value Date/Time    ALKPHOS 69 02/26/2024 02:31 PM    ALT 21 02/26/2024 02:31 PM    AST 22 02/26/2024 02:31 PM    BILITOT 0.4 02/26/2024 02:31 PM    BILIDIR <0.2 02/26/2024 02:31 PM       Magnesium:    Lab Results   Component Value Date/Time    MG 2.4 08/29/2024 04:25 AM       PT/INR:    Lab Results   Component Value Date/Time    INR 0.97 07/26/2022 06:43 AM       HgBA1c:  No results found for: \"LABA1C\"    FLP:    Lab Results   Component Value Date/Time    TRIG 73 02/26/2024 02:31 PM    HDL 54 02/26/2024 02:31 PM    LDLDIRECT 150 01/19/2017 09:18 AM       TSH:    Lab Results   Component Value Date/Time    TSH 2.350 08/28/2024 05:37 AM         Assessment:    PAF RVR   S/p MILAD/CV to NSR 8/28/24   Hx MILAD/CV 5/2022, 11/2021   s/p ablation 3/2020  Nonobstructive CAD per cath 2019  HTN  HLD  Worsening dilated CMP - ef 25-30 per MILAD 8/28/24  Hx NICMP - ef 40-45 per TTE 4/2023  Hx CVA    Discussed with dr jacobson  Plan:    Daily I/o and weights  2 liter fluid restriction and 2gm sodium diet  Keep mag >2 and k >4  Normal TSH  Hold xarelto  Start heparin gtt without bolus 24 hours after xarelto dose  Stop cardizem  Add toprol xl 25 mg po daily  Cont arb  Lifevest - ordered  Referral to chf clinic - ordered  Npo  LHC tomorrow tomorrow afternoon    Electronically signed by Radha Rae PA-C on 8/29/2024 at 10:41 AM

## 2024-08-29 NOTE — PLAN OF CARE
Problem: Discharge Planning  Goal: Discharge to home or other facility with appropriate resources  8/29/2024 1100 by Jen Denise RN  Outcome: Progressing     Problem: ABCDS Injury Assessment  Goal: Absence of physical injury  8/29/2024 1100 by Jen Denise RN  Outcome: Progressing     Problem: Cardiovascular - Adult  Goal: Maintains optimal cardiac output and hemodynamic stability  8/29/2024 1100 by Jen Denise RN  Outcome: Progressing     Problem: Cardiovascular - Adult  Goal: Absence of cardiac dysrhythmias or at baseline  8/29/2024 1100 by Jen Denise RN  Outcome: Progressing     Problem: Skin/Tissue Integrity - Adult  Goal: Skin integrity remains intact  8/29/2024 1100 by Jen Denise RN  Outcome: Progressing     Problem: Metabolic/Fluid and Electrolytes - Adult  Goal: Electrolytes maintained within normal limits  8/29/2024 1100 by Jen Denise RN  Outcome: Progressing     Problem: Metabolic/Fluid and Electrolytes - Adult  Goal: Hemodynamic stability and optimal renal function maintained  8/29/2024 1100 by Jen Denise RN  Outcome: Progressing     Problem: Hematologic - Adult  Goal: Maintains hematologic stability  8/29/2024 1100 by Jen Denise RN  Outcome: Progressing     Problem: Pain  Goal: Verbalizes/displays adequate comfort level or baseline comfort level  8/29/2024 1100 by Jen Denise RN  Outcome: Progressing     Problem: Chronic Conditions and Co-morbidities  Goal: Patient's chronic conditions and co-morbidity symptoms are monitored and maintained or improved  8/29/2024 1100 by Jen Denise RN  Outcome: Progressing    Care plan reviewed with patient , patient verbalized understanding of plan of care and contributed to goal setting.

## 2024-08-29 NOTE — CARE COORDINATION
DISCHARGE PLANNING EVALUATION  8/29/24, 2:52 PM EDT    Reason for Referral:  Resources  Decision Maker: Self  Current Services:  None  New Services Requested: None  Family/ Social/ Home environment:  Patient resides at home with kids.   Payment Source: [employer]  Transportation at Discharge: family  Post-acute (PAC) provider list was provided to patient. Patient was informed of their freedom to choose PAC provider. Discussed and offered to show the patient the relevant PAC Providers quality and resource use measures on Medicare Compare web site via computer based on patient's goals of care and treatment preferences. Questions regarding selection process were answered.      Teach Back Method used with Manuel regarding care plan and options for discharge.  Patient  verbalized understanding of the plan of care and contribute to goal setting.       Patient preferences and discharge plan: Discussed resources with patient for applying for disability if needed.  Discussed community resources with [employer] if he can not return to work.  Patient reaching ut to family to check in on his teenage kids.     Electronically signed by RICH Farmer on 8/29/2024 at 2:52 PM

## 2024-08-29 NOTE — H&P
8/29/24, 11:36 AM EDT    DISCHARGE ON GOING EVALUATION    Manuel THORNTON [facility] day: 1  Location: -06/006-A Reason for admit: Chronic atrial fibrillation (HCC) [I48.20]  Atrial fibrillation with rapid ventricular response (HCC) [I48.91]  Atrial fibrillation with RVR (HCC) [I48.91]  Chest pain, unspecified type [R07.9]     Procedures:   8/28 MILAD/CV: EF 25-30%. Successful DCCV x1 with conversion to NSR      Imaging since last note:   8/28 CXR: Negative    Barriers to Discharge: Transfer from . Hospitalist and Cardiology following. Psych to see. Successful CV yesterday. Cardizem gtt stopped and transitioned to PO. Xarelto. Celexa. K 3.9- PO replacement. Ical 1.08- IV replacement. Plan for [facility] tomorrow afternoon.     PCP: Brown Urbina, DO  Readmission Risk Score: 6.6    Patient Goals/Plan/Treatment Preferences: Plans to return home alone. SW to see for community resources.     Potential need for LifeVest discussed at IDR, pending cardiology's plan. Message sent to GIOVANNI Morris. States she will order LifeVest.     1:59 PM Spoke w/ Manuel. He is NOT refusing Life Vest. He was concerned with not being able to work as a  r/t Life Vest; however he is working with his boss to file disability. Updated Dr. Arredondo. Orders/Clinicals faxed to Mary Carmen. Referral called to [facility].

## 2024-08-29 NOTE — PROGRESS NOTES
Pt admitted to  5K6 by cart/stretcher from 4.  IV none infusing into the antecubital right, condition patent. IV site free of s/s of infection or infiltration. Vital signs obtained. Assessment complete. Oriented to room. All questions answered with no further questions at this time.     Two nurse skin assessment performed by Sonali HICKS and Peyton HICKS. Oriented to room. Policies and procedures for  explained. A Fall prevention and safety brochure discussed with patient.  Bed alarm on. Call light in reach.

## 2024-08-29 NOTE — CARE COORDINATION
CM Note  Client w 5K Transfer; Hand-Off given to RONALD Ramirez CM  Electronically signed by Manoj Cordon RN on 8/29/2024 at 7:19 AM

## 2024-08-29 NOTE — PROCEDURES
PROCEDURE NOTE  Date: 8/29/2024   Name: Manuel Irby  YOB: 1970    Procedures  EKG completed, given to Gemini HICKS

## 2024-08-29 NOTE — DISCHARGE INSTR - COC
Continuity of Care Form    Patient Name: Manuel Irby   :  1970  MRN:  [id number]    Admit date:  2024  Discharge date:  ***    Code Status Order: Full Code   Advance Directives:   Advance Care Flowsheet Documentation             Admitting Physician:  Hero Bashir DO  PCP: Brown Urbina DO    Discharging Nurse: ***  Discharging Hospital Unit/Room#: 5K-06/006-A  Discharging Unit Phone Number: ***    Emergency Contact:   Extended Emergency Contact Information  Primary Emergency Contact: LORNA IRBY  Mobile Phone: [phone]  Relation: Child   needed? No    Past Surgical History:  Past Surgical History:   Procedure Laterality Date    ABLATION OF DYSRHYTHMIC FOCUS      cyroablation    CARDIOVERSION      LAPAROSCOPIC APPENDECTOMY N/A 2020    APPENDECTOMY LAPAROSCOPIC converted to open performed by Kian Owusu MD at [facility] OR    TONSILLECTOMY      TONSILLECTOMY         Immunization History:   Immunization History   Administered Date(s) Administered    COVID-19, MODERNA BLUE border, Primary or Immunocompromised, (age 12y+), IM, 100 mcg/0.5mL 2021    Influenza, AFLURIA (age 3 y+), FLUZONE, (age 6 mo+), Quadv MDV, 0.5mL 2016, 2018       Active Problems:  Patient Active Problem List   Diagnosis Code    Persistent atrial fibrillation (HCC) I48.19    Episodic headache R51.9    Cerebrovascular accident (CVA) due to embolism of left middle cerebral artery (HCC) I63.412    CHF (NYHA class III, ACC/AHA stage C) ([facility]) I50.9    MR (mitral regurgitation) I34.0    Atrial fibrillation (HCC) I48.91    Abdominal pain, right lower quadrant R10.31    S/P appendectomy Z90.49    PAF (paroxysmal atrial fibrillation) (Formerly [facility]) S/P pvi 2020 AND NOW RECURRENCE 21 WITH cvr I48.0    History of CVA (cerebrovascular accident) Z86.73    Primary hypertension I10    Encounter for cardioversion procedure Z01.89    Atrial fibrillation with rapid ventricular response (HCC) I48.91     Chest pain R07.9       Isolation/Infection:   Isolation            No Isolation          Patient Infection Status       Infection Onset Added Last Indicated Last Indicated By Review Planned Expiration Resolved Resolved By    None active    Resolved    COVID-19 22 COVID-19 & Influenza Combo   22 Infection                        Nurse Assessment:  Last Vital Signs: /87   Pulse 88   Temp 98.6 °F (37 °C) (Oral)   Resp 18   Ht 1.753 m (5' 9\")   Wt 105.8 kg (233 lb 4.8 oz)   SpO2 96%   BMI 34.45 kg/m²     Last documented pain score (0-10 scale): Pain Level: 0  Last Weight:   Wt Readings from Last 1 Encounters:   24 105.8 kg (233 lb 4.8 oz)     Mental Status:  {IP PT MENTAL STATUS:}    IV Access:  { ABDI IV ACCESS:624173536}    Nursing Mobility/ADLs:  Walking   {CHP DME ADLs:347490356}  Transfer  {P DME ADLs:211987414}  Bathing  {P DME ADLs:719791771}  Dressing  {CHP DME ADLs:642681338}  Toileting  {CHP DME ADLs:463888011}  Feeding  {P DME ADLs:197444163}  Med Admin  {P DME ADLs:473852042}  Med Delivery   { ABDI MED Delivery:207739207}    Wound Care Documentation and Therapy:  Incision 20 Abdomen Medial;Upper (Active)   Number of days: 1550        Elimination:  Continence:   Bowel: {YES / NO:}  Bladder: {YES / NO:}  Urinary Catheter: {Urinary Catheter:888562013}   Colostomy/Ileostomy/Ileal Conduit: {YES / NO:}       Date of Last BM: ***    Intake/Output Summary (Last 24 hours) at 2024 1457  Last data filed at 2024 0443  Gross per 24 hour   Intake 360 ml   Output 200 ml   Net 160 ml     I/O last 3 completed shifts:  In: 375.6 [P.O.:350; I.V.:25.6]  Out: 1500 [Urine:1500]    Safety Concerns:     { ABDI Safety Concerns:150361530}    Impairments/Disabilities:      {MH ABDI Impairments/Disabilities:394278117}    Nutrition Therapy:  Current Nutrition Therapy:   {[facility] Diet List:244161712}    Routes of Feeding: {P [facility] Other

## 2024-08-29 NOTE — PLAN OF CARE
Problem: Discharge Planning  Goal: Discharge to home or other facility with appropriate resources  Outcome: Progressing     Problem: ABCDS Injury Assessment  Goal: Absence of physical injury  Outcome: Progressing     Problem: Cardiovascular - Adult  Goal: Maintains optimal cardiac output and hemodynamic stability  Outcome: Progressing     Problem: Cardiovascular - Adult  Goal: Absence of cardiac dysrhythmias or at baseline  Outcome: Progressing     Problem: Skin/Tissue Integrity - Adult  Goal: Skin integrity remains intact  Outcome: Progressing   Skin assessment completed.  Patient turned every 2 hours and as needed.  No skin breakdown this shift.      Problem: Metabolic/Fluid and Electrolytes - Adult  Goal: Electrolytes maintained within normal limits  Outcome: Progressing     Problem: Metabolic/Fluid and Electrolytes - Adult  Goal: Hemodynamic stability and optimal renal function maintained  Outcome: Progressing     Problem: Hematologic - Adult  Goal: Maintains hematologic stability  Outcome: Progressing     Problem: Pain  Goal: Verbalizes/displays adequate comfort level or baseline comfort level  Outcome: Progressing   Pain Assessment: None - Denies Pain  Pain Level: 0       Is pain goal met at this time?  Yes          Problem: Chronic Conditions and Co-morbidities  Goal: Patient's chronic conditions and co-morbidity symptoms are monitored and maintained or improved  Outcome: Progressing     Care plan reviewed with patient. Patient verbalizes understanding with the plan of care.

## 2024-08-29 NOTE — PROGRESS NOTES
Hospitalist Progress Note  Internal Medicine Resident      Patient: Manuel Irby 54 y.o. male      Unit/Bed: 5K-06/006-A    Admit Date: 8/28/2024      ASSESSMENT AND PLAN  Active Problems  Atrial Fibrillation with RVR: 2/2 medication noncompliance and high caffeine intake. CHADSVASC 3. DOAC Xarelto. Rate controlled on diltiazem. Patient was successfully cardioverted back to NSR rhythm on 8/28/24. Echo showed a drop in EF from 40% to 25-30%. The importance of a lifevest and medical compliance was discussed with the patient. He noted he had a lifevest previously and would need time to decide if he would want it again.   Continue oral diltiazem  Continue xarelto  Maintain K >4 and Mg >2  Cardiology consulted, plan for outpatient cath or stress test  Chronic Systolic HFrEF with new EF drop: Suspect 2/2 ischemia. Last echo 4/4/23 EF 40-45% with new Echo 8/28/24 EF 25-30%, with LV severely dilated and severe global hypokinesis. Patient denies any chest pain, shortness of breath, edema.   A LifeVest was dicussed with the patient with the risks of not wearing one explained. Patient stated he had previously had a LifeVest but had improvement in EF so was able to come off of it. He requested time to think about wearing a lifevest again with the risks understood.   Cardiology consulted, plan for outpatient cath vs stress test  Continue losartan, with GDMT adjustments from Cardiology  Nonobstructive CAD: Last [facility] 2019 showed intermittent disease of LAD/diagonal. Last stress test 2/27/24 was negative. EKG: No ischemic changes, NSR. Trop wnl.   Continue ASA, statin and diltiazem  Cardiology consulted  Depression/Anxiety: Patient has been going through a divorce and feeling more depressed and anxious. He denies any suicidal thoughts, however has been noncompliant with medications he understands the importance of taking.   Continue Celexa  Psychiatry consulted    Chronic Conditions (reviewed and stable unless otherwise  rivaroxaban  20 mg Oral Dinner    sodium chloride flush  5-40 mL IntraVENous 2 times per day    citalopram  10 mg Oral Daily    PRN Meds: sodium chloride flush, sodium chloride, potassium chloride **OR** potassium alternative oral replacement **OR** potassium chloride, magnesium sulfate, ondansetron **OR** ondansetron, polyethylene glycol, acetaminophen **OR** acetaminophen    Exam:  /87   Pulse 88   Temp 98.6 °F (37 °C) (Oral)   Resp 18   Ht 1.753 m (5' 9\")   Wt 105.8 kg (233 lb 4.8 oz)   SpO2 96%   BMI 34.45 kg/m²   General: No distress, appears stated age.  Eyes:  PERRL. Conjunctivae/corneas clear.  HENT: Head normal appearing. Nares normal. Oral mucosa moist.  Hearing intact.   Neck: Supple, with full range of motion. Trachea midline.  No gross JVD appreciated.  Respiratory:  Normal effort. Clear to auscultation, without rales or wheezes or rhonchi.  Cardiovascular: Normal rate, regular rhythm with normal S1/S2 without murmurs.    No lower extremity edema.   Abdomen: Soft, non-tender, non-distended with normal bowel sounds.  Musculoskeletal: No joint swelling or tenderness. Normal tone. No abnormal movements.   Skin: Warm and dry. No rashes or lesions.  Neurologic:  No focal sensory/motor deficits in the upper or lower extremities. Cranial nerves:  grossly non-focal 2-12.     Psychiatric: Alert and oriented, normal insight and thought content.   Capillary Refill: Brisk,< 3 seconds.  Peripheral Pulses: +2 palpable, equal bilaterally.       Labs/Radiology: See chart or assessment above.     Electronically signed by Aundrea N Newman-Waterhouse, DO on 8/29/2024 at 9:34 AM  Case was discussed with Attending, Dr. Hand.

## 2024-08-30 PROBLEM — I42.9 CARDIOMYOPATHY (HCC): Status: ACTIVE | Noted: 2024-08-30

## 2024-08-30 LAB
ANION GAP SERPL CALC-SCNC: 10 MEQ/L (ref 8–16)
APTT PPP: 76.4 SECONDS (ref 22–38)
BUN SERPL-MCNC: 9 MG/DL (ref 7–22)
CALCIUM SERPL-MCNC: 8.9 MG/DL (ref 8.5–10.5)
CHLORIDE SERPL-SCNC: 103 MEQ/L (ref 98–111)
CO2 SERPL-SCNC: 24 MEQ/L (ref 23–33)
CREAT SERPL-MCNC: 1 MG/DL (ref 0.4–1.2)
DEPRECATED RDW RBC AUTO: 43 FL (ref 35–45)
ECHO BSA: 2.27 M2
ERYTHROCYTE [DISTWIDTH] IN BLOOD BY AUTOMATED COUNT: 12.9 % (ref 11.5–14.5)
GFR SERPL CREATININE-BSD FRML MDRD: > 90 ML/MIN/1.73M2
GLUCOSE SERPL-MCNC: 100 MG/DL (ref 70–108)
HCT VFR BLD AUTO: 41.7 % (ref 42–52)
HGB BLD-MCNC: 14.2 GM/DL (ref 14–18)
MAGNESIUM SERPL-MCNC: 2.5 MG/DL (ref 1.6–2.4)
MCH RBC QN AUTO: 31.2 PG (ref 26–33)
MCHC RBC AUTO-ENTMCNC: 34.1 GM/DL (ref 32.2–35.5)
MCV RBC AUTO: 91.6 FL (ref 80–94)
PLATELET # BLD AUTO: 237 THOU/MM3 (ref 130–400)
PMV BLD AUTO: 10.4 FL (ref 9.4–12.4)
POTASSIUM SERPL-SCNC: 4.1 MEQ/L (ref 3.5–5.2)
RBC # BLD AUTO: 4.55 MILL/MM3 (ref 4.7–6.1)
SODIUM SERPL-SCNC: 137 MEQ/L (ref 135–145)
WBC # BLD AUTO: 5.5 THOU/MM3 (ref 4.8–10.8)

## 2024-08-30 PROCEDURE — 2500000003 HC RX 250 WO HCPCS: Performed by: INTERNAL MEDICINE

## 2024-08-30 PROCEDURE — C1894 INTRO/SHEATH, NON-LASER: HCPCS | Performed by: INTERNAL MEDICINE

## 2024-08-30 PROCEDURE — 4A023N7 MEASUREMENT OF CARDIAC SAMPLING AND PRESSURE, LEFT HEART, PERCUTANEOUS APPROACH: ICD-10-PCS | Performed by: INTERNAL MEDICINE

## 2024-08-30 PROCEDURE — 99152 MOD SED SAME PHYS/QHP 5/>YRS: CPT | Performed by: INTERNAL MEDICINE

## 2024-08-30 PROCEDURE — 6360000002 HC RX W HCPCS: Performed by: PHYSICIAN ASSISTANT

## 2024-08-30 PROCEDURE — B2111ZZ FLUOROSCOPY OF MULTIPLE CORONARY ARTERIES USING LOW OSMOLAR CONTRAST: ICD-10-PCS | Performed by: INTERNAL MEDICINE

## 2024-08-30 PROCEDURE — 6370000000 HC RX 637 (ALT 250 FOR IP)

## 2024-08-30 PROCEDURE — 80048 BASIC METABOLIC PNL TOTAL CA: CPT

## 2024-08-30 PROCEDURE — 36415 COLL VENOUS BLD VENIPUNCTURE: CPT

## 2024-08-30 PROCEDURE — 85730 THROMBOPLASTIN TIME PARTIAL: CPT

## 2024-08-30 PROCEDURE — 93458 L HRT ARTERY/VENTRICLE ANGIO: CPT | Performed by: INTERNAL MEDICINE

## 2024-08-30 PROCEDURE — 85027 COMPLETE CBC AUTOMATED: CPT

## 2024-08-30 PROCEDURE — 2709999900 HC NON-CHARGEABLE SUPPLY: Performed by: INTERNAL MEDICINE

## 2024-08-30 PROCEDURE — 6360000004 HC RX CONTRAST MEDICATION: Performed by: INTERNAL MEDICINE

## 2024-08-30 PROCEDURE — B2151ZZ FLUOROSCOPY OF LEFT HEART USING LOW OSMOLAR CONTRAST: ICD-10-PCS | Performed by: INTERNAL MEDICINE

## 2024-08-30 PROCEDURE — 1200000003 HC TELEMETRY R&B

## 2024-08-30 PROCEDURE — 99232 SBSQ HOSP IP/OBS MODERATE 35: CPT | Performed by: PHYSICIAN ASSISTANT

## 2024-08-30 PROCEDURE — C1769 GUIDE WIRE: HCPCS | Performed by: INTERNAL MEDICINE

## 2024-08-30 PROCEDURE — 2580000003 HC RX 258: Performed by: INTERNAL MEDICINE

## 2024-08-30 PROCEDURE — 99239 HOSP IP/OBS DSCHRG MGMT >30: CPT | Performed by: INTERNAL MEDICINE

## 2024-08-30 PROCEDURE — 83735 ASSAY OF MAGNESIUM: CPT

## 2024-08-30 PROCEDURE — 6360000002 HC RX W HCPCS: Performed by: INTERNAL MEDICINE

## 2024-08-30 PROCEDURE — 6370000000 HC RX 637 (ALT 250 FOR IP): Performed by: PHYSICIAN ASSISTANT

## 2024-08-30 RX ORDER — SODIUM CHLORIDE 0.9 % (FLUSH) 0.9 %
5-40 SYRINGE (ML) INJECTION PRN
Status: DISCONTINUED | OUTPATIENT
Start: 2024-08-30 | End: 2024-08-31 | Stop reason: HOSPADM

## 2024-08-30 RX ORDER — FENTANYL CITRATE 50 UG/ML
INJECTION, SOLUTION INTRAMUSCULAR; INTRAVENOUS PRN
Status: DISCONTINUED | OUTPATIENT
Start: 2024-08-30 | End: 2024-08-30 | Stop reason: HOSPADM

## 2024-08-30 RX ORDER — MIDAZOLAM HYDROCHLORIDE 1 MG/ML
INJECTION INTRAMUSCULAR; INTRAVENOUS PRN
Status: DISCONTINUED | OUTPATIENT
Start: 2024-08-30 | End: 2024-08-30 | Stop reason: HOSPADM

## 2024-08-30 RX ORDER — SODIUM CHLORIDE 0.9 % (FLUSH) 0.9 %
5-40 SYRINGE (ML) INJECTION EVERY 12 HOURS SCHEDULED
Status: DISCONTINUED | OUTPATIENT
Start: 2024-08-30 | End: 2024-08-31 | Stop reason: HOSPADM

## 2024-08-30 RX ORDER — SODIUM CHLORIDE 9 MG/ML
INJECTION, SOLUTION INTRAVENOUS CONTINUOUS
Status: DISCONTINUED | OUTPATIENT
Start: 2024-08-30 | End: 2024-08-31 | Stop reason: ALTCHOICE

## 2024-08-30 RX ORDER — SODIUM CHLORIDE 9 MG/ML
INJECTION, SOLUTION INTRAVENOUS PRN
Status: DISCONTINUED | OUTPATIENT
Start: 2024-08-30 | End: 2024-08-31 | Stop reason: HOSPADM

## 2024-08-30 RX ORDER — ATROPINE SULFATE 0.4 MG/ML
0.5 INJECTION, SOLUTION INTRAVENOUS
Status: ACTIVE | OUTPATIENT
Start: 2024-08-30 | End: 2024-08-31

## 2024-08-30 RX ORDER — LIDOCAINE HYDROCHLORIDE 20 MG/ML
INJECTION, SOLUTION EPIDURAL; INFILTRATION; INTRACAUDAL; PERINEURAL PRN
Status: DISCONTINUED | OUTPATIENT
Start: 2024-08-30 | End: 2024-08-30 | Stop reason: HOSPADM

## 2024-08-30 RX ORDER — ACETAMINOPHEN 325 MG/1
650 TABLET ORAL EVERY 4 HOURS PRN
Status: DISCONTINUED | OUTPATIENT
Start: 2024-08-30 | End: 2024-08-31 | Stop reason: HOSPADM

## 2024-08-30 RX ORDER — IOPAMIDOL 755 MG/ML
INJECTION, SOLUTION INTRAVASCULAR PRN
Status: DISCONTINUED | OUTPATIENT
Start: 2024-08-30 | End: 2024-08-30 | Stop reason: HOSPADM

## 2024-08-30 RX ADMIN — METOPROLOL SUCCINATE 25 MG: 25 TABLET, FILM COATED, EXTENDED RELEASE ORAL at 08:14

## 2024-08-30 RX ADMIN — HEPARIN SODIUM 12 UNITS/KG/HR: 10000 INJECTION, SOLUTION INTRAVENOUS at 01:48

## 2024-08-30 RX ADMIN — ATORVASTATIN CALCIUM 10 MG: 10 TABLET, FILM COATED ORAL at 08:14

## 2024-08-30 RX ADMIN — LOSARTAN POTASSIUM 25 MG: 25 TABLET, FILM COATED ORAL at 08:14

## 2024-08-30 RX ADMIN — HEPARIN SODIUM 2000 UNITS: 1000 INJECTION INTRAVENOUS; SUBCUTANEOUS at 01:47

## 2024-08-30 RX ADMIN — ACETAMINOPHEN 650 MG: 325 TABLET ORAL at 08:14

## 2024-08-30 RX ADMIN — MIRTAZAPINE 15 MG: 15 TABLET, FILM COATED ORAL at 21:06

## 2024-08-30 RX ADMIN — SODIUM CHLORIDE: 9 INJECTION, SOLUTION INTRAVENOUS at 10:57

## 2024-08-30 ASSESSMENT — PAIN SCALES - GENERAL
PAINLEVEL_OUTOF10: 2
PAINLEVEL_OUTOF10: 0

## 2024-08-30 ASSESSMENT — PAIN DESCRIPTION - LOCATION: LOCATION: HEAD

## 2024-08-30 ASSESSMENT — PAIN DESCRIPTION - ONSET: ONSET: ON-GOING

## 2024-08-30 ASSESSMENT — PAIN DESCRIPTION - DESCRIPTORS: DESCRIPTORS: ACHING

## 2024-08-30 ASSESSMENT — PAIN DESCRIPTION - PAIN TYPE: TYPE: ACUTE PAIN

## 2024-08-30 ASSESSMENT — PAIN DESCRIPTION - ORIENTATION: ORIENTATION: MID

## 2024-08-30 ASSESSMENT — PAIN DESCRIPTION - FREQUENCY: FREQUENCY: CONTINUOUS

## 2024-08-30 ASSESSMENT — PAIN - FUNCTIONAL ASSESSMENT: PAIN_FUNCTIONAL_ASSESSMENT: ACTIVITIES ARE NOT PREVENTED

## 2024-08-30 NOTE — H&P
[facility]  Sedation/Analgesia History & Physical    Pt Name: Manuel Irby  Account number: [id number]  MRN: [id number]  YOB: 1970  Provider Performing Procedure: Jamir Dennis MD MD  Referring Provider: Sohn Hand MD   Primary Care Physician: Brown Urbina,   Date: 8/30/2024    PRE-PROCEDURE    Code Status: FULL CODE  Brief History/Pre-Procedure Diagnosis:   Cardiomyopathy new    Consent: : I have discussed with the patient risks, benefits, and alternatives (and relevant risks, benefits, and side effects related to alternatives or not receiving care), and likelihood of the success.   The patient and/or representative appear to understand and agree to proceed.  The discussion encompasses risks, benefits, and side effects related to the alternatives and the risks related to not receiving the proposed care, treatment, and services.     The indication, risks and benefits of the procedure and possible therapeutic consequences and alternatives were discussed with the patient. The patient was given the opportunity to ask questions and to have them answered to his/her satisfaction. Risks of the procedure include but are not limited to the following: Bleeding, hematoma including retroperitoneal hemmorhage, infection, pain and discomfort, injury to the aorta and other blood vessels, rhythm disturbance, low blood pressure, myocardial infarction, stroke, kidney damage/failure, myocardial perforation, allergic reactions to sedatives/contrast material, loss of pulse/vascular compromise requiring surgery, aneurysm/pseudoaneurysm formation, possible loss of a limb/hand/leg, needing blood transfusion, requiring emergent open heart surgery or emergent coronary intervention, the need for intubation/respiratory support, the requirement for defibrillation/cardioversion, and death. Alternatives to and omission of the suggested procedure were discussed. The patient had no further questions  diagnostic studies complete and results available.   Comment:    [x]Previous sedation/anesthesia experiences assessed.   Comment:    [x]The patient is an appropriate candidate to undergo the planned procedure sedation and anesthesia. (Refer to nursing sedation/analgesia documentation record)  [x]Formulation and discussion of sedation/procedure plan, risks, and expectations with patient and/or responsible adult completed.  [x]Patient examined immediately prior to the procedure. (Refer to nursing sedation/analgesia documentation record)    Jamir Dennis MD MD   Electronically signed 8/30/2024 at 10:29 AM

## 2024-08-30 NOTE — PROGRESS NOTES
Cardiology Progress Note      Patient:  Manuel Irby  YOB: 1970  MRN: [id number]   Acct: [id number]  Admit Date:  8/28/2024  Primary Cardiologist: Veronica Mayorga MD    Note per dr jacobson \"CHIEF COMPLAINT:         Chief Complaint   Patient presents with    Tachycardia    Chest Pain            HPI: This is a pleasant 54 y.o. male with pmhx of PAF, non-obstructive CAD, HFrEF, hypertension hyperlipidemia, hx of CVA  who presents with for evaluation of palpitation and tachycardia.  Patient stated that he is going through a stressful time due to family related issue.  Patient stated that he has not taken his medication for the last 3-month due to the family related issues.  Endorsed fatigue and tiredness.  Patient also stated that he drank 2 Celsius drinks 3 energy drink the day before presentation of with palpitation and tachycardia.  Currently asymptomatic. no shortness of breath chest pain diaphoresis nausea or vomiting.     Cardiology was consulted for A-fib with RVR in a setting of known PAF.     Initial EKG showed A-fib RVR but RVR resolved on repeat EKG  Meds at home: Takes Cardizem, Xarelto  Troponin negative, <6  Vitals wnl.\"    Subjective (Events in last 24 hours):   Remains in NSR.  Awaiting cath today. Denies any CP, SOB or palpitations.  No orthopnea, pedal edema, or PND.    Objective:   /72   Pulse 79   Temp 98.3 °F (36.8 °C) (Oral)   Resp 18   Ht 1.753 m (5' 9\")   Wt 105.8 kg (233 lb 4.8 oz)   SpO2 96%   BMI 34.45 kg/m²        TELEMETRY:NSR    Physical Exam:  General Appearance: alert and oriented to person, place and time, in no acute distress  Cardiovascular: normal rate, regular rhythm, normal S1 and S2, no murmurs, rubs, clicks, or gallops, distal pulses intact, no carotid bruits, no JVD  Pulmonary/Chest: clear to auscultation bilaterally- no wheezes, rales or rhonchi, normal air movement, no respiratory distress  Abdomen: soft, non-tender, non-distended, normal bowel  Velocity: 9.7                        cm/s   cm/s                                              PV Peak Gradient: 1.02   MV E' Lateral Velocity:                           mmHg   7.8 cm/s   MV A' Lateral Velocity:   9.9 cm/s   E/E' septal: 10.58   E/E' lateral: 8.68   MR Velocity: 256 cm/s     [id number]  [email or web address]       Stress Gisella: No results found for this or any previous visit.    Cardiac Monitor: No results found for this or any previous visit.    Dylon Stress Test: No results found for this or any previous visit.    Cath:   Results for orders placed or performed during the hospital encounter of 22   Diagnostic Cardiac Cath Lab Procedure    Transcription                           [place] [address]                            CARDIAC CATHETERIZATION    PATIENT NAME: REMINGTON BAXTER                     :        1970  MED REC NO:   [id number]                           ROOM:       [facility]  ACCOUNT NO:   [id number]                           ADMIT DATE: 2022  PROVIDER:     Ash Ramirez M.D.    DATE OF PROCEDURE:  2022    INDICATION FOR PROCEDURE:  This is a 51-year-old gentleman with history  of atrial fibrillation, ablation treatment; recurrent atrial  fibrillation.  He has been anticoagulated.  He has been treated with  antiarrhythmic medication, continued to be in atrial fib, symptomatic;  admitted for cardioversion.  The patient understands the procedure, the  benefit, the risk, the alternative methods of treatment, the possible  complications, and agrees to have it done.    PROCEDURES PERFORMED:  1.  IV conscious sedation:  The patient was given IV conscious sedation  in incremental dosage by the circulating cath lab RN, monitored by the  cath lab monitor tech under my supervision.  The procedure started at  08:00 a.m. and finished at

## 2024-08-30 NOTE — PLAN OF CARE
Problem: Discharge Planning  Goal: Discharge to home or other facility with appropriate resources  Outcome: Progressing     Problem: Cardiovascular - Adult  Goal: Maintains optimal cardiac output and hemodynamic stability  Outcome: Progressing     Problem: Skin/Tissue Integrity - Adult  Goal: Skin integrity remains intact  Outcome: Progressing  Flowsheets (Taken 8/30/2024 4862)  Skin Integrity Remains Intact: Monitor for areas of redness and/or skin breakdown

## 2024-08-30 NOTE — PROGRESS NOTES
Hospitalist Progress Note  Internal Medicine Resident      Patient: Manuel Irby 54 y.o. male      Unit/Bed: 5K-06/006-A    Admit Date: 8/28/2024      ASSESSMENT AND PLAN  Active Problems  Atrial Fibrillation with RVR: 2/2 medication noncompliance and high caffeine intake. CHADSVASC 3. DOAC Xarelto. Rate controlled on diltiazem. Patient was successfully cardioverted back to NSR rhythm on 8/28/24. Echo showed a drop in EF from 40% to 25-30%. The importance of a lifevest and medical compliance was discussed with the patient. He noted he had a lifevest previously and would need time to decide if he would want it again.   Continue oral toprol  Continue xarelto  Maintain K >4 and Mg >2  Cardiology consulted, plan for outpatient cath or stress test  Chronic Systolic HFrEF with new EF drop: Suspect 2/2 ischemia. Last echo 4/4/23 EF 40-45% with new Echo 8/28/24 EF 25-30%, with LV severely dilated and severe global hypokinesis. Patient denies any chest pain, shortness of breath, edema. A LifeVest was dicussed with the patient with the risks of not wearing one explained. Patient stated he had previously had a LifeVest but had improvement in EF so was able to come off of it. He requested time to think about wearing a lifevest again with the risks understood. The patient accepted the LifeVest.  LifeVest ordered  Cardiology consulted, plan for [facility] today  Continue losartan and toprol GDMT  Nonobstructive CAD: Last [facility] 2019 showed intermittent disease of LAD/diagonal. Last stress test 2/27/24 was negative. EKG: No ischemic changes, NSR. Trop wnl.   Continue ASA, statin and toprol  Cardiology consulted  Depression/Anxiety: Patient has been going through a divorce and feeling more depressed and anxious. He denies any suicidal thoughts, however has been noncompliant with medications he understands the importance of taking.   Continue Remeron  Psychiatry consulted  Chronic Conditions (reviewed and stable unless otherwise  provider] rivaroxaban  20 mg Oral Dinner    sodium chloride flush  5-40 mL IntraVENous 2 times per day    PRN Meds: regadenoson, heparin (porcine), heparin (porcine), sodium chloride flush, sodium chloride, potassium chloride **OR** potassium alternative oral replacement **OR** potassium chloride, magnesium sulfate, ondansetron **OR** ondansetron, polyethylene glycol, acetaminophen **OR** acetaminophen    Exam:  BP 97/75   Pulse 70   Temp 97.6 °F (36.4 °C) (Oral)   Resp 18   Ht 1.753 m (5' 9\")   Wt 105.8 kg (233 lb 4.8 oz)   SpO2 96%   BMI 34.45 kg/m²   General: No distress, appears stated age.  Eyes:  PERRL. Conjunctivae/corneas clear.  HENT: Head normal appearing. Nares normal. Oral mucosa moist.  Hearing intact.   Neck: Supple, with full range of motion. Trachea midline.  No gross JVD appreciated.  Respiratory:  Normal effort. Clear to auscultation, without rales or wheezes or rhonchi.  Cardiovascular: Normal rate, regular rhythm with normal S1/S2 without murmurs.    No lower extremity edema.   Abdomen: Soft, non-tender, non-distended with normal bowel sounds.  Musculoskeletal: No joint swelling or tenderness. Normal tone. No abnormal movements.   Skin: Warm and dry. No rashes or lesions.  Neurologic:  No focal sensory/motor deficits in the upper or lower extremities. Cranial nerves:  grossly non-focal 2-12.     Psychiatric: Alert and oriented, normal insight and thought content.   Capillary Refill: Brisk,< 3 seconds.  Peripheral Pulses: +2 palpable, equal bilaterally.       Labs/Radiology: See chart or assessment above.     Electronically signed by Aundrea N Newman-Waterhouse, DO on 8/30/2024 at 8:02 AM  Case was discussed with Attending, Dr. Hand.

## 2024-08-30 NOTE — PLAN OF CARE
Problem: Discharge Planning  Goal: Discharge to home or other facility with appropriate resources  Outcome: Progressing  Flowsheets (Taken 8/29/2024 2100)  Discharge to home or other facility with appropriate resources:   Identify barriers to discharge with patient and caregiver   Identify discharge learning needs (meds, wound care, etc)   Arrange for needed discharge resources and transportation as appropriate     Problem: ABCDS Injury Assessment  Goal: Absence of physical injury  Outcome: Progressing     Problem: Cardiovascular - Adult  Goal: Maintains optimal cardiac output and hemodynamic stability  Outcome: Progressing  Flowsheets (Taken 8/29/2024 2100)  Maintains optimal cardiac output and hemodynamic stability:   Monitor blood pressure and heart rate   Assess for signs of decreased cardiac output     Problem: Cardiovascular - Adult  Goal: Absence of cardiac dysrhythmias or at baseline  Outcome: Progressing  Flowsheets (Taken 8/29/2024 2100)  Absence of cardiac dysrhythmias or at baseline:   Monitor cardiac rate and rhythm   Assess for signs of decreased cardiac output     Problem: Skin/Tissue Integrity - Adult  Goal: Skin integrity remains intact  Outcome: Progressing  Flowsheets (Taken 8/29/2024 2100)  Skin Integrity Remains Intact: Monitor for areas of redness and/or skin breakdown     Problem: Metabolic/Fluid and Electrolytes - Adult  Goal: Electrolytes maintained within normal limits  Outcome: Progressing  Flowsheets (Taken 8/29/2024 2100)  Electrolytes maintained within normal limits:   Monitor labs and assess patient for signs and symptoms of electrolyte imbalances   Monitor response to electrolyte replacements, including repeat lab results as appropriate   Administer electrolyte replacement as ordered     Problem: Metabolic/Fluid and Electrolytes - Adult  Goal: Hemodynamic stability and optimal renal function maintained  Outcome: Progressing  Flowsheets (Taken 8/29/2024 2100)  Hemodynamic stability

## 2024-08-30 NOTE — PROGRESS NOTES
Patient taken to cath lab at this time. SonSebastián called and updated on patient en route to cath and patient will be moved to 8A post procedure.     Report called to Demetrio on 8A, all questions answered.

## 2024-08-31 VITALS
SYSTOLIC BLOOD PRESSURE: 109 MMHG | TEMPERATURE: 98.5 F | HEIGHT: 69 IN | DIASTOLIC BLOOD PRESSURE: 70 MMHG | BODY MASS INDEX: 34.55 KG/M2 | OXYGEN SATURATION: 98 % | HEART RATE: 73 BPM | WEIGHT: 233.3 LBS | RESPIRATION RATE: 18 BRPM

## 2024-08-31 LAB
DEPRECATED RDW RBC AUTO: 43.5 FL (ref 35–45)
ERYTHROCYTE [DISTWIDTH] IN BLOOD BY AUTOMATED COUNT: 13 % (ref 11.5–14.5)
HCT VFR BLD AUTO: 42.8 % (ref 42–52)
HGB BLD-MCNC: 14.3 GM/DL (ref 14–18)
MCH RBC QN AUTO: 30.9 PG (ref 26–33)
MCHC RBC AUTO-ENTMCNC: 33.4 GM/DL (ref 32.2–35.5)
MCV RBC AUTO: 92.4 FL (ref 80–94)
PLATELET # BLD AUTO: 223 THOU/MM3 (ref 130–400)
PMV BLD AUTO: 10.2 FL (ref 9.4–12.4)
RBC # BLD AUTO: 4.63 MILL/MM3 (ref 4.7–6.1)
WBC # BLD AUTO: 5.5 THOU/MM3 (ref 4.8–10.8)

## 2024-08-31 PROCEDURE — 2580000003 HC RX 258: Performed by: INTERNAL MEDICINE

## 2024-08-31 PROCEDURE — 6370000000 HC RX 637 (ALT 250 FOR IP)

## 2024-08-31 PROCEDURE — 99232 SBSQ HOSP IP/OBS MODERATE 35: CPT | Performed by: PHYSICIAN ASSISTANT

## 2024-08-31 PROCEDURE — 6370000000 HC RX 637 (ALT 250 FOR IP): Performed by: PHYSICIAN ASSISTANT

## 2024-08-31 PROCEDURE — 99239 HOSP IP/OBS DSCHRG MGMT >30: CPT | Performed by: INTERNAL MEDICINE

## 2024-08-31 PROCEDURE — 85027 COMPLETE CBC AUTOMATED: CPT

## 2024-08-31 PROCEDURE — 36415 COLL VENOUS BLD VENIPUNCTURE: CPT

## 2024-08-31 RX ORDER — METOPROLOL SUCCINATE 25 MG/1
25 TABLET, EXTENDED RELEASE ORAL DAILY
Qty: 30 TABLET | Refills: 0 | Status: SHIPPED | OUTPATIENT
Start: 2024-09-01

## 2024-08-31 RX ORDER — MIRTAZAPINE 15 MG/1
15 TABLET, FILM COATED ORAL NIGHTLY
Qty: 30 TABLET | Refills: 0 | Status: SHIPPED | OUTPATIENT
Start: 2024-08-31

## 2024-08-31 RX ORDER — ASPIRIN 325 MG
325 TABLET ORAL ONCE
Status: DISCONTINUED | OUTPATIENT
Start: 2024-08-31 | End: 2024-08-31

## 2024-08-31 RX ORDER — ASPIRIN 81 MG/1
81 TABLET ORAL DAILY
Qty: 30 TABLET | Refills: 0 | Status: SHIPPED | OUTPATIENT
Start: 2024-08-31

## 2024-08-31 RX ORDER — NITROGLYCERIN 0.4 MG/1
0.4 TABLET SUBLINGUAL EVERY 5 MIN PRN
Status: DISCONTINUED | OUTPATIENT
Start: 2024-08-31 | End: 2024-08-31 | Stop reason: HOSPADM

## 2024-08-31 RX ORDER — SODIUM CHLORIDE 9 MG/ML
INJECTION, SOLUTION INTRAVENOUS CONTINUOUS
Status: DISCONTINUED | OUTPATIENT
Start: 2024-08-31 | End: 2024-08-31 | Stop reason: ALTCHOICE

## 2024-08-31 RX ORDER — SODIUM CHLORIDE 9 MG/ML
INJECTION, SOLUTION INTRAVENOUS PRN
Status: DISCONTINUED | OUTPATIENT
Start: 2024-08-31 | End: 2024-08-31 | Stop reason: ALTCHOICE

## 2024-08-31 RX ORDER — SODIUM CHLORIDE 0.9 % (FLUSH) 0.9 %
5-40 SYRINGE (ML) INJECTION PRN
Status: DISCONTINUED | OUTPATIENT
Start: 2024-08-31 | End: 2024-08-31 | Stop reason: HOSPADM

## 2024-08-31 RX ORDER — ATORVASTATIN CALCIUM 10 MG/1
10 TABLET, FILM COATED ORAL DAILY
Qty: 30 TABLET | Refills: 0 | Status: SHIPPED | OUTPATIENT
Start: 2024-08-31

## 2024-08-31 RX ORDER — SODIUM CHLORIDE 0.9 % (FLUSH) 0.9 %
5-40 SYRINGE (ML) INJECTION EVERY 12 HOURS SCHEDULED
Status: DISCONTINUED | OUTPATIENT
Start: 2024-08-31 | End: 2024-08-31 | Stop reason: HOSPADM

## 2024-08-31 RX ORDER — LOSARTAN POTASSIUM 25 MG/1
25 TABLET ORAL DAILY
Qty: 30 TABLET | Refills: 0 | Status: SHIPPED | OUTPATIENT
Start: 2024-08-31

## 2024-08-31 RX ADMIN — SODIUM CHLORIDE, PRESERVATIVE FREE 10 ML: 5 INJECTION INTRAVENOUS at 09:05

## 2024-08-31 RX ADMIN — METOPROLOL SUCCINATE 25 MG: 25 TABLET, FILM COATED, EXTENDED RELEASE ORAL at 09:05

## 2024-08-31 RX ADMIN — LOSARTAN POTASSIUM 25 MG: 25 TABLET, FILM COATED ORAL at 09:05

## 2024-08-31 RX ADMIN — ATORVASTATIN CALCIUM 10 MG: 10 TABLET, FILM COATED ORAL at 09:07

## 2024-08-31 ASSESSMENT — PAIN SCALES - GENERAL
PAINLEVEL_OUTOF10: 0
PAINLEVEL_OUTOF10: 0

## 2024-08-31 NOTE — PROGRESS NOTES
Cardiology Progress Note      Patient:  Manuel Irby  YOB: 1970  MRN: [id number]   Acct: [id number]  Admit Date:  8/28/2024  Primary Cardiologist: Veronica Mayorga MD    Note per dr jacobson \"CHIEF COMPLAINT:         Chief Complaint   Patient presents with    Tachycardia    Chest Pain            HPI: This is a pleasant 54 y.o. male with pmhx of PAF, non-obstructive CAD, HFrEF, hypertension hyperlipidemia, hx of CVA  who presents with for evaluation of palpitation and tachycardia.  Patient stated that he is going through a stressful time due to family related issue.  Patient stated that he has not taken his medication for the last 3-month due to the family related issues.  Endorsed fatigue and tiredness.  Patient also stated that he drank 2 Celsius drinks 3 energy drink the day before presentation of with palpitation and tachycardia.  Currently asymptomatic. no shortness of breath chest pain diaphoresis nausea or vomiting.     Cardiology was consulted for A-fib with RVR in a setting of known PAF.     Initial EKG showed A-fib RVR but RVR resolved on repeat EKG  Meds at home: Takes Cardizem, Xarelto  Troponin negative, <6  Vitals wnl.\"    Subjective (Events in last 24 hours):   Feeling well today.  Denies any CP, SOB or palpitations.  No issues with fluid retention.          8/30/2024  Remains in NSR.  Awaiting cath today. Denies any CP, SOB or palpitations.  No orthopnea, pedal edema, or PND.    Objective:   /86   Pulse 71   Temp 98.4 °F (36.9 °C) (Oral)   Resp 20   Ht 1.753 m (5' 9\")   Wt 105.8 kg (233 lb 4.8 oz)   SpO2 97%   BMI 34.45 kg/m²        TELEMETRY:NSR    Physical Exam:  General Appearance: alert and oriented to person, place and time, in no acute distress  Cardiovascular: normal rate, regular rhythm, normal S1 and S2, no murmurs, rubs, clicks, or gallops, distal pulses intact, no carotid bruits, no JVD  Pulmonary/Chest: clear to auscultation bilaterally- no wheezes, rales or  04/04/2023 Start: 12:01 PM    Study Location: Echo Lab  Technical Quality: Limited visualization due to poor acoustical window.    Indications:Shortness of breath.    Additional Medical History:Hypertension, Hyperlipidemia, A fib,  Cardioversion, Stroke, CHF, Former smoker, Family history of heart disease    Patient Status: Routine    Height: 68.9 inches Weight: 216.01 pounds BSA: 2.13 m^2 BMI: 31.99 kg/m^2    BP: 145/100 mmHg    Allergies    - See Epic.     Conclusions      Summary   Left ventricle size is normal.   Normal left ventricular wall thickness.   There was moderate global hypokinesis of the left ventricle.   Systolic function was moderately reduced.   Ejection fraction is visually estimated in the range of 40% to 45%.   The left atrium is Mildly dilated.      Signature      ----------------------------------------------------------------   Electronically signed by Jalil Camp MD (Interpreting   physician) on 04/05/2023 at 07:35 PM   ----------------------------------------------------------------      Findings      Mitral Valve   The mitral valve structure was normal with normal leaflet separation.   DOPPLER: The transmitral velocity was within the normal range with no   evidence for mitral stenosis.   Mild mitral regurgitation is present.      Aortic Valve   The aortic valve was trileaflet with normal thickness and cuspal   separation. DOPPLER: Transaortic velocity was within the normal range with   no evidence of aortic stenosis. There was no evidence of aortic   regurgitation.      Tricuspid Valve   The tricuspid valve structure was normal with normal leaflet separation.   DOPPLER: There was no evidence of tricuspid stenosis. There was no   evidence of tricuspid regurgitation.      Pulmonic Valve   The pulmonic valve leaflets exhibited normal thickness, no calcification,   and normal cuspal separation. DOPPLER: The transpulmonic velocity was   within the normal range with no evidence for  The  advantages and disadvantages and possible complications have been  explained to the patient.  The possible complications include groin  hematoma, AV fistula, pseudoaneurysm of the right femoral vein,  pericardial effusion, pericardial tamponade, stroke, and death have been  discussed in detail with the patient.  All the questions of the patient  have been answered accordingly.  Signed consent form obtained.    PHYSICAL EXAMINATION:  LUNGS:  Clear to auscultation.  HEART:  Regular rate and rhythm.    ASA SCORE:  III with moderate systemic disease.    MALLAMPATI I SCORE:  III.    DESCRIPTION OF PROCEDURE:  On the day of the procedure, the patient was  brought to the EP Lab at [facility].  The right groin was  prepped under sterile circumstances.  The femoral vein was accessed x3  using ultrasound.  Intracardiac echocardiogram was used to assess the  baseline cardiac structure and atrial septum.  A transeptal was  performed successfully under ultrasound and fluoroscopy without any  acute complication.  Pulmonary vein isolation was performed in sequence  including left superior pulmonary vein, left inferior pulmonary vein,  right inferior pulmonary vein, and right superior pulmonary vein.   During ablation, phrenic nerve position of the right side tested with  phrenic nerve stimulation.  The esophageal temperature has been  monitored closely, especially on the right side since the esophagus was  noticed to be rightward in orientation.  The patient tolerated the  procedure well and he will be observed overnight, and follow up with Dr. Ramirez as mentioned above.    Thanks for letting us to participate in the management of this patient.   If you have any questions or concerns, please let us know.        VERONICA MEJIA M.D.    D: 03/03/2020 11:41:45       T: 03/03/2020 13:06:57     [email or web address]  Job#: [id number]     Doc#: [id number]    CC:           Lab Data:    Cardiac Enzymes:  No results for input(s):

## 2024-08-31 NOTE — DISCHARGE SUMMARY
Hospital Medicine Discharge Summary      Patient Identification:   Manuel Irby   : 1970  MRN: [id number]   Account: [id number]      Patient's PCP: Brown Urbina DO    Admit Date: 2024     Discharge Date:   24    Admitting Physician: No admitting provider for patient encounter.     Discharge Physician: Raffy Hanson MD     Discharge Diagnoses: Atrial Fibrillation with RVR s/p MILAD; Non Ischemic Dilated Cardiomyopathy with EF 25%     Active Hospital Problems    Diagnosis Date Noted    Cardiomyopathy (HCC) [I42.9] 2024    Atrial fibrillation with rapid ventricular response (HCC) [I48.91] 2024    Chest pain [R07.9] 2024       The patient was seen and examined on day of discharge and this discharge summary is in conjunction with any daily progress note from day of discharge.    Hospital Course:   Manuel Irby is a 54 y.o. male admitted to [facility] on 2024 for palpitations. He was found to be in Afib with RVR and Diltiazem drip was started with improvement in HR. The patient had taken his xarelto before coming to the ED, but had not been compliant on his medications up until symptoms. He has been going through a divorce and is neglecting his wellbeing. He was started on celexa and psychiatry was consulted. Cardiology was consulted and cardioverted the patient successfully.  Underwent LHC which was non obstructed. Found to have dilated cardiomyopathy. Patient placed on life vest. Doing well, currently in NSR. Cardiology recommending discharge on Toprol, Cozaar.  Aldactone to be added as outpatient if BP allows, Farxiga after checking with insurance. Resume Xarelto.     2L fluid restriction  2gm Na diet    Follow-up with Cardiology in 2 weeks.           Exam:     Vitals:  Vitals:    24 2337 24 0532 24 0745 24 1156   BP: 102/64 114/83 118/86 109/70   Pulse: 69 62 71 73   Resp: 17 20 20 18   Temp: 98 °F (36.7 °C) 97.8 °F  (36.6 °C) 98.4 °F (36.9 °C) 98.5 °F (36.9 °C)   TempSrc: Oral Oral Oral Oral   SpO2: 95% 97% 97% 98%   Weight:       Height:         Weight: Weight - Scale: 105.8 kg (233 lb 4.8 oz)     24 hour intake/output:  Intake/Output Summary (Last 24 hours) at 8/31/2024 1328  Last data filed at 8/31/2024 1156  Gross per 24 hour   Intake 2175.15 ml   Output --   Net 2175.15 ml       Labs: For convenience and continuity at follow-up the following most recent labs are provided:      CBC:    Lab Results   Component Value Date/Time    WBC 5.5 08/31/2024 05:56 AM    HGB 14.3 08/31/2024 05:56 AM    HCT 42.8 08/31/2024 05:56 AM     08/31/2024 05:56 AM       Renal:    Lab Results   Component Value Date/Time     08/30/2024 04:22 AM    K 4.1 08/30/2024 04:22 AM    K 3.9 08/28/2024 01:50 AM     08/30/2024 04:22 AM    CO2 24 08/30/2024 04:22 AM    BUN 9 08/30/2024 04:22 AM    CREATININE 1.0 08/30/2024 04:22 AM    CALCIUM 8.9 08/30/2024 04:22 AM         Significant Diagnostic Studies    Radiology:   XR CHEST PORTABLE   Final Result   Impression:   No acute pathology.      This document has been electronically signed by: Lorraine Sheikh MD on    08/28/2024 02:37 AM             Consults:     IP CONSULT TO CARDIOLOGY  IP CONSULT TO PSYCHIATRY  IP CONSULT TO SOCIAL WORK  IP CONSULT TO HEART FAILURE NURSE/COORDINATOR    Disposition:    [x] Home       [] TCU       [] Rehab       [] Psych       [] SNF       [] Long Term Care Facility       [] Other-    Condition at Discharge: Stable    Code Status:  Full Code     Patient Instructions:    Activity: activity as tolerated  Diet: ADULT DIET; Regular      Follow-up visits:   Brown Urbina DO  [address] [place] [address], [address] [address]  [phone]    Follow up  Please call Tuesday morning to schedule a follow up appointment for 1 week.    Brown Urbina DO  [address] [place] [address], [address] [address]  [phone]          Mukesh Martin MD  [address]  [place] [id number]  [phone]    Follow up  Please call Tuesday and make a follow up appointment for 2 to 4 weeks         Discharge Medications:        Medication List        START taking these medications      metoprolol succinate 25 MG extended release tablet  Commonly known as: TOPROL XL  Take 1 tablet by mouth daily  Start taking on: September 1, 2024     mirtazapine 15 MG tablet  Commonly known as: REMERON  Take 1 tablet by mouth nightly            CONTINUE taking these medications      acetaminophen 500 MG tablet  Commonly known as: TYLENOL     aspirin 81 MG EC tablet  Take 1 tablet by mouth daily     atorvastatin 10 MG tablet  Commonly known as: Lipitor  Take 1 tablet by mouth daily     hydrOXYzine pamoate 25 MG capsule  Commonly known as: Vistaril  Take 1 capsule by mouth 3 times daily as needed for Anxiety     losartan 25 MG tablet  Commonly known as: COZAAR  Take 1 tablet by mouth daily     potassium chloride 20 MEQ extended release tablet  Commonly known as: KLOR-CON M  Take 1 tablet by mouth daily     rivaroxaban 20 MG Tabs tablet  Commonly known as: Xarelto  Take 1 tablet by mouth daily (with breakfast)            STOP taking these medications      dilTIAZem 60 MG tablet  Commonly known as: CARDIZEM               Where to Get Your Medications        These medications were sent to [facility]/pharmacy #[id number] - [place], [address] - P [phone] - F [phone]  [address] [place], [place] [address]      Phone: [phone]   aspirin 81 MG EC tablet  atorvastatin 10 MG tablet  losartan 25 MG tablet  metoprolol succinate 25 MG extended release tablet  mirtazapine 15 MG tablet         Time Spent on discharge is more than 30 minutes in the examination, evaluation, counseling and review of medications and discharge plan.          Signed:    Thank you Brown Urbina DO for the opportunity to be involved in this patient's care.    Electronically signed by Raffy Hanson MD on 8/31/2024

## 2024-08-31 NOTE — PLAN OF CARE
Problem: Discharge Planning  Goal: Discharge to home or other facility with appropriate resources  Outcome: Completed     Problem: ABCDS Injury Assessment  Goal: Absence of physical injury  Outcome: Completed     Problem: Cardiovascular - Adult  Goal: Maintains optimal cardiac output and hemodynamic stability  Outcome: Completed  Flowsheets (Taken 8/31/2024 6900)  Maintains optimal cardiac output and hemodynamic stability: Monitor blood pressure and heart rate  Goal: Absence of cardiac dysrhythmias or at baseline  Outcome: Completed     Problem: Skin/Tissue Integrity - Adult  Goal: Skin integrity remains intact  Outcome: Completed     Problem: Metabolic/Fluid and Electrolytes - Adult  Goal: Electrolytes maintained within normal limits  Outcome: Completed  Goal: Hemodynamic stability and optimal renal function maintained  Outcome: Completed     Problem: Hematologic - Adult  Goal: Maintains hematologic stability  Outcome: Completed     Problem: Pain  Goal: Verbalizes/displays adequate comfort level or baseline comfort level  Outcome: Completed     Problem: Chronic Conditions and Co-morbidities  Goal: Patient's chronic conditions and co-morbidity symptoms are monitored and maintained or improved  Outcome: Completed

## 2024-08-31 NOTE — PLAN OF CARE
Problem: Discharge Planning  Goal: Discharge to home or other facility with appropriate resources  8/30/2024 2155 by Christina Rollins RN  Outcome: Progressing  8/30/2024 1828 by Demetrio Peck RN  Outcome: Progressing     Problem: ABCDS Injury Assessment  Goal: Absence of physical injury  Outcome: Progressing     Problem: Cardiovascular - Adult  Goal: Maintains optimal cardiac output and hemodynamic stability  8/30/2024 2155 by Christina Rollins RN  Outcome: Progressing  8/30/2024 1828 by Demetrio Peck RN  Outcome: Progressing  Goal: Absence of cardiac dysrhythmias or at baseline  8/30/2024 2155 by Christina Rollins RN  Outcome: Progressing  8/30/2024 1828 by Demetrio Peck RN  Outcome: Progressing     Problem: Cardiovascular - Adult  Goal: Absence of cardiac dysrhythmias or at baseline  8/30/2024 2155 by Christina Rollins RN  Outcome: Progressing  8/30/2024 1828 by Demetrio Peck RN  Outcome: Progressing     Problem: Skin/Tissue Integrity - Adult  Goal: Skin integrity remains intact  8/30/2024 2155 by Christina Rollins RN  Outcome: Progressing  8/30/2024 1828 by Demetrio Peck RN  Outcome: Progressing  Flowsheets (Taken 8/30/2024 1632)  Skin Integrity Remains Intact: Monitor for areas of redness and/or skin breakdown     Problem: Metabolic/Fluid and Electrolytes - Adult  Goal: Electrolytes maintained within normal limits  Outcome: Progressing  Goal: Hemodynamic stability and optimal renal function maintained  Outcome: Progressing     Problem: Hematologic - Adult  Goal: Maintains hematologic stability  Outcome: Progressing     Problem: Pain  Goal: Verbalizes/displays adequate comfort level or baseline comfort level  Outcome: Progressing     Problem: Chronic Conditions and Co-morbidities  Goal: Patient's chronic conditions and co-morbidity symptoms are monitored and maintained or improved  Outcome: Progressing

## 2024-09-01 PROBLEM — F33.1 MAJOR DEPRESSIVE DISORDER, RECURRENT, MODERATE (HCC): Status: ACTIVE | Noted: 2024-09-01

## 2024-09-03 ENCOUNTER — TELEPHONE (OUTPATIENT)
Dept: FAMILY MEDICINE CLINIC | Age: 54
End: 2024-09-03

## 2024-09-03 ENCOUNTER — CARE COORDINATION (OUTPATIENT)
Dept: CASE MANAGEMENT | Age: 54
End: 2024-09-03

## 2024-09-03 ENCOUNTER — TELEPHONE (OUTPATIENT)
Dept: CARDIOLOGY CLINIC | Age: 54
End: 2024-09-03

## 2024-09-03 NOTE — CARE COORDINATION
Care Transitions Note    Initial Call - Call within 2 business days of discharge: Yes    Attempted to reach patient for transitions of care follow up. Unable to reach patient.    Outreach Attempts:   HIPAA compliant voicemail left for patient.     1st attempt to contact pt for initial care transition follow up.  Unable to reach pt.  Left message with contact information and request for call back.      Patient: Manuel Irby    Patient : 1970   MRN: [id number]    Reason for Admission: Tachycardia, Chest pain  Discharge Date: 24  RURS: Readmission Risk Score: 7.8    Last Discharge Facility       Date Complaint Diagnosis Description Type Department Provider    24 Tachycardia; Chest Pain Chest pain, unspecified type ... ED to Hosp-Admission (Discharged) (ADMITTED) GIANCARLO GARDNERB Raffy Hanson MD; HERNANDO Bashir.            Was this an external facility discharge? No    Follow Up Appointment:   Patient has hospital follow up appointment scheduled within 7 days of discharge.    Future Appointments         Provider Specialty Dept Phone    2024 12:45 PM Mukesh Martin MD Cardiology [phone]    3/11/2025 2:00 PM Mukesh Martin MD Cardiology [phone]            Plan for follow-up on next business day.      Génesis Portillo RN

## 2024-09-03 NOTE — TELEPHONE ENCOUNTER
Care Transitions Initial Follow Up Call    Outreach made within 2 business days of discharge: Yes    Patient: Manuel Irby Patient : 1970   MRN: [id number]  Reason for Admission: Atrial fibrillation with rapid ventricular response     Discharge Date: 24       Spoke with: Attempted to speak with patient, left voice mail message    Discharge department/facility: [facility]    Scheduled appointment with PCP within 7-14 days    Follow Up  Future Appointments   Date Time Provider Department Center   2024 12:45 PM Mukesh Martin MD [facility]   3/11/2025  2:00 PM Mukesh Martin MD [facility] L[facility]

## 2024-09-03 NOTE — CARE COORDINATION
9/3/24, 7:08 AM EDT    Patient goals/plan/ treatment preferences discussed by  and .  Patient goals/plan/ treatment preferences reviewed with patient/ family.  Patient/ family verbalize understanding of discharge plan and are in agreement with goal/plan/treatment preferences.  Understanding was demonstrated using the teach back method.  AVS provided by RN at time of discharge, which includes all necessary medical information pertaining to the patients current course of illness, treatment, post-discharge goals of care, and treatment preferences.     Services At/After Discharge: None    Per chart review, pt discharge home 8/31

## 2024-09-04 ENCOUNTER — OFFICE VISIT (OUTPATIENT)
Dept: CARDIOLOGY CLINIC | Age: 54
End: 2024-09-04
Payer: COMMERCIAL

## 2024-09-04 ENCOUNTER — CARE COORDINATION (OUTPATIENT)
Dept: CASE MANAGEMENT | Age: 54
End: 2024-09-04

## 2024-09-04 VITALS
DIASTOLIC BLOOD PRESSURE: 76 MMHG | HEIGHT: 69 IN | HEART RATE: 82 BPM | WEIGHT: 231.4 LBS | SYSTOLIC BLOOD PRESSURE: 124 MMHG | BODY MASS INDEX: 34.27 KG/M2

## 2024-09-04 DIAGNOSIS — I48.0 PAF (PAROXYSMAL ATRIAL FIBRILLATION) (HCC): Primary | ICD-10-CM

## 2024-09-04 DIAGNOSIS — R06.02 SHORTNESS OF BREATH: ICD-10-CM

## 2024-09-04 PROCEDURE — G8417 CALC BMI ABV UP PARAM F/U: HCPCS | Performed by: INTERNAL MEDICINE

## 2024-09-04 PROCEDURE — 3074F SYST BP LT 130 MM HG: CPT | Performed by: INTERNAL MEDICINE

## 2024-09-04 PROCEDURE — G8427 DOCREV CUR MEDS BY ELIG CLIN: HCPCS | Performed by: INTERNAL MEDICINE

## 2024-09-04 PROCEDURE — 99214 OFFICE O/P EST MOD 30 MIN: CPT | Performed by: INTERNAL MEDICINE

## 2024-09-04 PROCEDURE — 1111F DSCHRG MED/CURRENT MED MERGE: CPT | Performed by: INTERNAL MEDICINE

## 2024-09-04 PROCEDURE — 1036F TOBACCO NON-USER: CPT | Performed by: INTERNAL MEDICINE

## 2024-09-04 PROCEDURE — 93000 ELECTROCARDIOGRAM COMPLETE: CPT | Performed by: INTERNAL MEDICINE

## 2024-09-04 PROCEDURE — 3017F COLORECTAL CA SCREEN DOC REV: CPT | Performed by: INTERNAL MEDICINE

## 2024-09-04 PROCEDURE — 3078F DIAST BP <80 MM HG: CPT | Performed by: INTERNAL MEDICINE

## 2024-09-04 NOTE — CARE COORDINATION
Care Transitions Note    Initial Call - Call within 2 business days of discharge: Yes    Attempted to reach patient for transitions of care follow up. Unable to reach patient.    Outreach Attempts:   HIPAA compliant voicemail left for patient.     2nd attempt to contact pt for initial care transition follow up.  Unable to reach pt.  Left message with contact information and request for call back.      Program will be closed and CTN to sign off if return call is not received from the patient.      Patient: Manuel Irby    Patient : 1970   MRN: [id number]    Reason for Admission: Tachycardia, Chest pain  Discharge Date: 24  RURS: Readmission Risk Score: 7.8    Last Discharge Facility       Date Complaint Diagnosis Description Type Department Provider    24 Tachycardia; Chest Pain Chest pain, unspecified type ... ED to Hosp-Admission (Discharged) (ADMITTED) Raffy Amor MD; HERNANDO Bashir.            Was this an external facility discharge? No    Follow Up Appointment:   Patient has hospital follow up appointment scheduled within 7 days of discharge.    Future Appointments         Provider Specialty Dept Phone    2024 12:45 PM Mukesh Martin MD Cardiology [phone]    2024 1:00 PM Bethany Cho, APRN - CNP Cardiology [phone]    3/11/2025 2:00 PM Mukesh Martin MD Cardiology [phone]            No further follow-up call indicated     Génesis Portillo RN

## 2024-09-04 NOTE — PROGRESS NOTES
Pt here for persistent A-fib, was recently in hospital and cardioverted.     Denies any cardiac symptoms at this time.  
   GLUCOSE 100 08/30/2024 04:22 AM       Lab Results   Component Value Date/Time    ALKPHOS 69 02/26/2024 02:31 PM    ALT 21 02/26/2024 02:31 PM    AST 22 02/26/2024 02:31 PM    BILITOT 0.4 02/26/2024 02:31 PM    BILIDIR <0.2 02/26/2024 02:31 PM       Lab Results   Component Value Date/Time    MG 2.5 08/30/2024 04:22 AM       Lab Results   Component Value Date    INR 1.27 (H) 08/29/2024    INR 0.97 07/26/2022    INR 1.68 (H) 05/27/2022         No results found for: \"LABA1C\"    Lab Results   Component Value Date/Time    TRIG 73 02/26/2024 02:31 PM    HDL 54 02/26/2024 02:31 PM    LDLDIRECT 150 01/19/2017 09:18 AM       Lab Results   Component Value Date/Time    TSH 2.350 08/28/2024 05:37 AM         Testing Reviewed:      I have individually reviewed the cardiac test below:    ECHO 8/2024    Left Ventricle: Severely reduced left ventricular systolic function with a visually estimated EF of 25 - 30%. Left ventricle is severely dilated. Normal wall thickness. Severe global hypokinesis present.    Image quality is adequate.    Successful DCCV x 1 with conversion to NSR    Cath 8/2024  Left Main   The vessel was visualized by angiography. Size of vessel >=2.0 mm. The vessel is angiographically normal.      Left Anterior Descending   The vessel was visualized by angiography. Size of vessel >=2.0 mm. The vessel is angiographically normal.      Left Circumflex   The vessel was visualized by angiography. Size of vessel >=2.0 mm. The vessel is angiographically normal.   Mid Cx to Dist Cx lesion, 45% stenosed.      Right Coronary Artery   The vessel was visualized by angiography. Size of vessel >=2.0 mm. The vessel is angiographically normal.          Ekg:   EKG Interpretation:  Sinus Rhythm   Low voltage in precordial leads.  -Old anterior infarct.    ABNORMAL  .    AssessmentPlan:     ROBLES  Atrial fibrillation  H/o atrial fibrillation ablation  Cardiomyopathy (?Tachy induced)    CHF  Nonobstructive coronary artery disease

## 2024-09-05 ENCOUNTER — TELEPHONE (OUTPATIENT)
Dept: CARDIOLOGY CLINIC | Age: 54
End: 2024-09-05

## 2024-09-06 NOTE — TELEPHONE ENCOUNTER
[place] paperwork completed, signed and faxed.   Placed in Dr. Martin folder to follow or adjust if needed,

## 2024-09-10 ENCOUNTER — TELEPHONE (OUTPATIENT)
Dept: CARDIOLOGY CLINIC | Age: 54
End: 2024-09-10

## 2024-09-10 DIAGNOSIS — R06.02 SHORTNESS OF BREATH: Primary | ICD-10-CM

## 2024-09-20 ENCOUNTER — HOSPITAL ENCOUNTER (OUTPATIENT)
Age: 54
Discharge: HOME | End: 2024-09-22
Attending: INTERNAL MEDICINE
Payer: COMMERCIAL

## 2024-09-20 VITALS — SYSTOLIC BLOOD PRESSURE: 124 MMHG | DIASTOLIC BLOOD PRESSURE: 76 MMHG

## 2024-09-20 DIAGNOSIS — R06.02 SHORTNESS OF BREATH: ICD-10-CM

## 2024-09-20 LAB
ECHO AO ASC DIAM: 3.1 CM
ECHO AV CUSP MM: 1.9 CM
ECHO LA AREA 2C: 13.2 CM2
ECHO LA AREA 4C: 12.4 CM2
ECHO LA DIAMETER: 3.6 CM
ECHO LA MAJOR AXIS: 4.8 CM
ECHO LA MINOR AXIS: 4.4 CM
ECHO LA VOL BP: 29 ML (ref 18–58)
ECHO LA VOL MOD A2C: 32 ML (ref 18–58)
ECHO LA VOL MOD A4C: 24 ML (ref 18–58)
ECHO LV EDV A2C: 94 ML
ECHO LV EDV A4C: 90 ML
ECHO LV EF PHYSICIAN: 40 %
ECHO LV EJECTION FRACTION A2C: 40 %
ECHO LV EJECTION FRACTION A4C: 31 %
ECHO LV ESV A2C: 57 ML
ECHO LV ESV A4C: 62 ML
ECHO LV FRACTIONAL SHORTENING: 15 % (ref 28–44)
ECHO LV INTERNAL DIMENSION DIASTOLIC: 4.6 CM (ref 4.2–5.9)
ECHO LV INTERNAL DIMENSION SYSTOLIC: 3.9 CM
ECHO LV IVSD: 1.1 CM (ref 0.6–1)
ECHO LV MASS 2D: 192.9 G (ref 88–224)
ECHO LV POSTERIOR WALL DIASTOLIC: 1.2 CM (ref 0.6–1)
ECHO LV RELATIVE WALL THICKNESS RATIO: 0.52
ECHO RV INTERNAL DIMENSION: 2.7 CM
ECHO RV TAPSE: 2.3 CM (ref 1.7–?)

## 2024-09-20 PROCEDURE — 93307 TTE W/O DOPPLER COMPLETE: CPT

## 2024-09-20 PROCEDURE — 93307 TTE W/O DOPPLER COMPLETE: CPT | Performed by: INTERNAL MEDICINE

## 2024-09-23 ENCOUNTER — TELEPHONE (OUTPATIENT)
Dept: CARDIOLOGY CLINIC | Age: 54
End: 2024-09-23

## 2024-10-08 DIAGNOSIS — E78.5 HYPERLIPIDEMIA LDL GOAL <130: ICD-10-CM

## 2024-10-08 DIAGNOSIS — I10 PRIMARY HYPERTENSION: ICD-10-CM

## 2024-10-08 DIAGNOSIS — F41.9 ANXIETY: ICD-10-CM

## 2024-10-08 RX ORDER — POTASSIUM CHLORIDE 1500 MG/1
20 TABLET, EXTENDED RELEASE ORAL DAILY
Qty: 90 TABLET | Refills: 3 | Status: SHIPPED | OUTPATIENT
Start: 2024-10-08

## 2024-10-08 RX ORDER — ASPIRIN 81 MG/1
81 TABLET ORAL DAILY
Qty: 30 TABLET | Refills: 0 | Status: SHIPPED | OUTPATIENT
Start: 2024-10-08

## 2024-10-08 RX ORDER — MIRTAZAPINE 15 MG/1
15 TABLET, FILM COATED ORAL NIGHTLY
Qty: 30 TABLET | Refills: 0 | Status: SHIPPED | OUTPATIENT
Start: 2024-10-08

## 2024-10-08 RX ORDER — ATORVASTATIN CALCIUM 10 MG/1
10 TABLET, FILM COATED ORAL DAILY
Qty: 30 TABLET | Refills: 0 | Status: SHIPPED | OUTPATIENT
Start: 2024-10-08

## 2024-10-08 RX ORDER — METOPROLOL SUCCINATE 25 MG/1
25 TABLET, EXTENDED RELEASE ORAL DAILY
Qty: 30 TABLET | Refills: 0 | Status: SHIPPED | OUTPATIENT
Start: 2024-10-08

## 2024-10-08 NOTE — TELEPHONE ENCOUNTER
Manuel L Houston called requesting a refill on the following medications:  Requested Prescriptions     Pending Prescriptions Disp Refills    atorvastatin (LIPITOR) 10 MG tablet 30 tablet 0     Sig: Take 1 tablet by mouth daily    aspirin 81 MG EC tablet 30 tablet 0     Sig: Take 1 tablet by mouth daily    mirtazapine (REMERON) 15 MG tablet 30 tablet 0     Sig: Take 1 tablet by mouth nightly    sacubitril-valsartan (ENTRESTO) 24-26 MG per tablet 60 tablet 3     Sig: Take 1 tablet by mouth 2 times daily    metoprolol succinate (TOPROL XL) 25 MG extended release tablet 30 tablet 0     Sig: Take 1 tablet by mouth daily    potassium chloride (KLOR-CON M) 20 MEQ extended release tablet 90 tablet 3     Sig: Take 1 tablet by mouth daily     Pharmacy verified:    [facility]/pharmacy #[id number] - [address] [phone] -  [phone]     Date of last visit: 09/04/2024  Date of next visit (if applicable): 3/11/2025

## 2024-10-16 ENCOUNTER — OFFICE VISIT (OUTPATIENT)
Dept: CARDIOLOGY CLINIC | Age: 54
End: 2024-10-16
Payer: COMMERCIAL

## 2024-10-16 VITALS
OXYGEN SATURATION: 97 % | HEART RATE: 82 BPM | BODY MASS INDEX: 34.36 KG/M2 | DIASTOLIC BLOOD PRESSURE: 84 MMHG | RESPIRATION RATE: 18 BRPM | HEIGHT: 69 IN | WEIGHT: 232 LBS | SYSTOLIC BLOOD PRESSURE: 128 MMHG

## 2024-10-16 DIAGNOSIS — I50.22 CHRONIC SYSTOLIC CONGESTIVE HEART FAILURE, NYHA CLASS 2 (HCC): Primary | ICD-10-CM

## 2024-10-16 DIAGNOSIS — I42.8 NONISCHEMIC CARDIOMYOPATHY (HCC): ICD-10-CM

## 2024-10-16 DIAGNOSIS — I48.0 PAF (PAROXYSMAL ATRIAL FIBRILLATION) (HCC): ICD-10-CM

## 2024-10-16 DIAGNOSIS — Z91.89 AT RISK FOR FLUID VOLUME OVERLOAD: ICD-10-CM

## 2024-10-16 PROCEDURE — 1036F TOBACCO NON-USER: CPT | Performed by: NURSE PRACTITIONER

## 2024-10-16 PROCEDURE — 3074F SYST BP LT 130 MM HG: CPT | Performed by: NURSE PRACTITIONER

## 2024-10-16 PROCEDURE — G8427 DOCREV CUR MEDS BY ELIG CLIN: HCPCS | Performed by: NURSE PRACTITIONER

## 2024-10-16 PROCEDURE — G8417 CALC BMI ABV UP PARAM F/U: HCPCS | Performed by: NURSE PRACTITIONER

## 2024-10-16 PROCEDURE — 3079F DIAST BP 80-89 MM HG: CPT | Performed by: NURSE PRACTITIONER

## 2024-10-16 PROCEDURE — G8484 FLU IMMUNIZE NO ADMIN: HCPCS | Performed by: NURSE PRACTITIONER

## 2024-10-16 PROCEDURE — 3017F COLORECTAL CA SCREEN DOC REV: CPT | Performed by: NURSE PRACTITIONER

## 2024-10-16 PROCEDURE — 99214 OFFICE O/P EST MOD 30 MIN: CPT | Performed by: NURSE PRACTITIONER

## 2024-10-16 RX ORDER — DAPAGLIFLOZIN 10 MG/1
10 TABLET, FILM COATED ORAL EVERY MORNING
Qty: 90 TABLET | Refills: 3 | Status: SHIPPED | OUTPATIENT
Start: 2024-10-16

## 2024-10-16 RX ORDER — SPIRONOLACTONE 25 MG/1
25 TABLET ORAL DAILY
Qty: 15 TABLET | Refills: 0 | Status: SHIPPED | OUTPATIENT
Start: 2024-10-16

## 2024-10-16 ASSESSMENT — ENCOUNTER SYMPTOMS
VOMITING: 0
ABDOMINAL DISTENTION: 0
COUGH: 0
NAUSEA: 0
SHORTNESS OF BREATH: 0

## 2024-10-16 NOTE — PROGRESS NOTES
Heart Failure Clinic       Visit Date: 10/16/2024  Cardiologist:  Dr. Martin  Primary Care Physician: Dr. Urbina, Brown Wallace,     Manuel Irby is a 54 y.o. male who presents today for:  Chief Complaint   Patient presents with    Congestive Heart Failure       HPI:     TYPE HF: HFrEF 40-45% 2024    Cause: nonischemic CM - tachy induced/afib w/ RVR w/ hx of ETOH abuse   Device:   HX: Afib,  HTN,   Dry Wt:  232 on 10/16/24      Manuel Irby is a 54 y.o. male who presents to the office for a new patient visit in the heart failure clinic.    Concerns today: here today as a new pt referred from recent hospitalization for newly found nonischemic cardiomyopathy, tachy induced. Pt was cardioverted in house with improved EF.     Patient has:  Chest Pain: no  Lightheaded/dizzy: no   SOB: resolved  Orthopnea/PND: no  BELINDA: no- tested 2017  Edema: resolved  Fatigue: no  Abdominal bloating: no  Cough: no  Appetite: good    Activity: ADLs performed w/o ROBLES,   Diet: Educated - has stopped adding salt, under 2L/day    Patient follows:      Hospitalization:      Admit Date: 8/28/2024      Discharge Date:   08/31/24    Past Medical History:   Diagnosis Date    Atrial fibrillation (HCC)     Cerebral artery occlusion with cerebral infarction (HCC)     TIA from afib    CHF (NYHA class III, ACC/AHA stage C) (HCC) 11/22/2016    Hyperlipidemia     Hypertension      Past Surgical History:   Procedure Laterality Date    ABLATION OF DYSRHYTHMIC FOCUS      cyroablation    CARDIAC PROCEDURE N/A 8/30/2024    Left heart cath / coronary angiography performed by Jamir Dennis MD at [facility] CARDIAC CATH LAB    CARDIOVERSION  2019    LAPAROSCOPIC APPENDECTOMY N/A 5/31/2020    APPENDECTOMY LAPAROSCOPIC converted to open performed by Kian Owusu MD at [facility] OR    TONSILLECTOMY      TONSILLECTOMY       Family History   Problem Relation Age of Onset    Heart Disease Father     Cancer Maternal Aunt     Heart Failure

## 2024-10-16 NOTE — PATIENT INSTRUCTIONS
You may receive a survey regarding the care you received during your visit.  Your input is valuable to us.  We encourage you to complete and return your survey.  We hope you will choose us in the future for your healthcare needs.    Your nurses today were Rocky.  Office hours:   Mon-Thurs 8-4:30  Friday 8-12  Phone: [phone]    Continue:  Continue current medications  Daily weights and record  Fluid restriction of 2 Liters per day  Limit sodium in diet to around 0814-0686 mg/day  Monitor BP  Activity as tolerated     Call the Heart Failure Clinic for any of the following symptoms:   Weight gain of -3 pounds in 1 day or 5 pounds in 1 week  Increased shortness of breath  Shortness of breath while laying down  Cough  Chest pain  Swelling in feet, ankles or legs  Bloating in abdomen  Fatigue      Repeat blood work in 2 weeks and 4 weeks     Start taking 25mg of aldactone daily   Start taking 10mg of farxiga daily     Continue diet/fluid adherence  Continue daily wts.  F/U w/ Cardiology  F/U in clinic in 3 months

## 2024-10-24 DIAGNOSIS — I50.22 CHRONIC SYSTOLIC CONGESTIVE HEART FAILURE, NYHA CLASS 2 (HCC): Primary | ICD-10-CM

## 2024-10-24 DIAGNOSIS — I42.8 NONISCHEMIC CARDIOMYOPATHY (HCC): ICD-10-CM

## 2024-10-24 RX ORDER — SPIRONOLACTONE 25 MG/1
25 TABLET ORAL DAILY
Qty: 90 TABLET | Refills: 3 | Status: SHIPPED | OUTPATIENT
Start: 2024-10-24

## 2024-10-30 ENCOUNTER — HOSPITAL ENCOUNTER (OUTPATIENT)
Age: 54
Discharge: HOME | End: 2024-10-30
Payer: COMMERCIAL

## 2024-10-30 DIAGNOSIS — I42.8 NONISCHEMIC CARDIOMYOPATHY (HCC): ICD-10-CM

## 2024-10-30 LAB
ANION GAP SERPL CALC-SCNC: 12 MEQ/L (ref 8–16)
BUN SERPL-MCNC: 13 MG/DL (ref 7–22)
CALCIUM SERPL-MCNC: 9.4 MG/DL (ref 8.5–10.5)
CHLORIDE SERPL-SCNC: 102 MEQ/L (ref 98–111)
CO2 SERPL-SCNC: 25 MEQ/L (ref 23–33)
CREAT SERPL-MCNC: 1 MG/DL (ref 0.4–1.2)
GFR SERPL CREATININE-BSD FRML MDRD: 90 ML/MIN/1.73M2
GLUCOSE SERPL-MCNC: 103 MG/DL (ref 70–108)
POTASSIUM SERPL-SCNC: 4.2 MEQ/L (ref 3.5–5.2)
SODIUM SERPL-SCNC: 139 MEQ/L (ref 135–145)

## 2024-10-30 PROCEDURE — 80048 BASIC METABOLIC PNL TOTAL CA: CPT

## 2024-10-30 PROCEDURE — 36415 COLL VENOUS BLD VENIPUNCTURE: CPT

## 2024-10-31 RX ORDER — METOPROLOL SUCCINATE 25 MG/1
25 TABLET, EXTENDED RELEASE ORAL DAILY
Qty: 90 TABLET | Refills: 1 | Status: SHIPPED | OUTPATIENT
Start: 2024-10-31

## 2024-10-31 RX ORDER — ASPIRIN 81 MG/1
81 TABLET, COATED ORAL DAILY
Qty: 90 TABLET | Refills: 1 | Status: SHIPPED | OUTPATIENT
Start: 2024-10-31

## 2024-10-31 RX ORDER — MIRTAZAPINE 15 MG/1
15 TABLET, FILM COATED ORAL NIGHTLY
Qty: 90 TABLET | Refills: 1 | Status: SHIPPED | OUTPATIENT
Start: 2024-10-31

## 2024-10-31 RX ORDER — ATORVASTATIN CALCIUM 10 MG/1
10 TABLET, FILM COATED ORAL DAILY
Qty: 90 TABLET | Refills: 1 | Status: SHIPPED | OUTPATIENT
Start: 2024-10-31

## 2024-11-01 ENCOUNTER — TELEPHONE (OUTPATIENT)
Dept: CARDIOLOGY CLINIC | Age: 54
End: 2024-11-01

## 2024-11-01 NOTE — TELEPHONE ENCOUNTER
----- Message from Aleta Aparicio, ISABEL - CNP sent at 11/1/2024 10:37 AM EDT -----  Labs reviewed - stable - BMP again end of month (already ordered)

## 2024-11-09 ASSESSMENT — PATIENT HEALTH QUESTIONNAIRE - PHQ9
6. FEELING BAD ABOUT YOURSELF - OR THAT YOU ARE A FAILURE OR HAVE LET YOURSELF OR YOUR FAMILY DOWN: NOT AT ALL
SUM OF ALL RESPONSES TO PHQ QUESTIONS 1-9: 1
SUM OF ALL RESPONSES TO PHQ QUESTIONS 1-9: 1
5. POOR APPETITE OR OVEREATING: NOT AT ALL
10. IF YOU CHECKED OFF ANY PROBLEMS, HOW DIFFICULT HAVE THESE PROBLEMS MADE IT FOR YOU TO DO YOUR WORK, TAKE CARE OF THINGS AT HOME, OR GET ALONG WITH OTHER PEOPLE: NOT DIFFICULT AT ALL
3. TROUBLE FALLING OR STAYING ASLEEP: NOT AT ALL
9. THOUGHTS THAT YOU WOULD BE BETTER OFF DEAD, OR OF HURTING YOURSELF: NOT AT ALL
5. POOR APPETITE OR OVEREATING: NOT AT ALL
3. TROUBLE FALLING OR STAYING ASLEEP: NOT AT ALL
7. TROUBLE CONCENTRATING ON THINGS, SUCH AS READING THE NEWSPAPER OR WATCHING TELEVISION: NOT AT ALL
4. FEELING TIRED OR HAVING LITTLE ENERGY: SEVERAL DAYS
SUM OF ALL RESPONSES TO PHQ QUESTIONS 1-9: 1
1. LITTLE INTEREST OR PLEASURE IN DOING THINGS: NOT AT ALL
2. FEELING DOWN, DEPRESSED OR HOPELESS: NOT AT ALL
8. MOVING OR SPEAKING SO SLOWLY THAT OTHER PEOPLE COULD HAVE NOTICED. OR THE OPPOSITE - BEING SO FIDGETY OR RESTLESS THAT YOU HAVE BEEN MOVING AROUND A LOT MORE THAN USUAL: NOT AT ALL
9. THOUGHTS THAT YOU WOULD BE BETTER OFF DEAD, OR OF HURTING YOURSELF: NOT AT ALL
4. FEELING TIRED OR HAVING LITTLE ENERGY: SEVERAL DAYS
7. TROUBLE CONCENTRATING ON THINGS, SUCH AS READING THE NEWSPAPER OR WATCHING TELEVISION: NOT AT ALL
10. IF YOU CHECKED OFF ANY PROBLEMS, HOW DIFFICULT HAVE THESE PROBLEMS MADE IT FOR YOU TO DO YOUR WORK, TAKE CARE OF THINGS AT HOME, OR GET ALONG WITH OTHER PEOPLE: NOT DIFFICULT AT ALL
2. FEELING DOWN, DEPRESSED OR HOPELESS: NOT AT ALL
SUM OF ALL RESPONSES TO PHQ QUESTIONS 1-9: 1
SUM OF ALL RESPONSES TO PHQ9 QUESTIONS 1 & 2: 0
1. LITTLE INTEREST OR PLEASURE IN DOING THINGS: NOT AT ALL
8. MOVING OR SPEAKING SO SLOWLY THAT OTHER PEOPLE COULD HAVE NOTICED. OR THE OPPOSITE, BEING SO FIGETY OR RESTLESS THAT YOU HAVE BEEN MOVING AROUND A LOT MORE THAN USUAL: NOT AT ALL
6. FEELING BAD ABOUT YOURSELF - OR THAT YOU ARE A FAILURE OR HAVE LET YOURSELF OR YOUR FAMILY DOWN: NOT AT ALL
SUM OF ALL RESPONSES TO PHQ QUESTIONS 1-9: 1

## 2024-11-11 SDOH — ECONOMIC STABILITY: INCOME INSECURITY: HOW HARD IS IT FOR YOU TO PAY FOR THE VERY BASICS LIKE FOOD, HOUSING, MEDICAL CARE, AND HEATING?: SOMEWHAT HARD

## 2024-11-11 SDOH — ECONOMIC STABILITY: FOOD INSECURITY: WITHIN THE PAST 12 MONTHS, THE FOOD YOU BOUGHT JUST DIDN'T LAST AND YOU DIDN'T HAVE MONEY TO GET MORE.: SOMETIMES TRUE

## 2024-11-11 SDOH — ECONOMIC STABILITY: FOOD INSECURITY: WITHIN THE PAST 12 MONTHS, YOU WORRIED THAT YOUR FOOD WOULD RUN OUT BEFORE YOU GOT MONEY TO BUY MORE.: SOMETIMES TRUE

## 2024-11-11 SDOH — ECONOMIC STABILITY: TRANSPORTATION INSECURITY
IN THE PAST 12 MONTHS, HAS LACK OF TRANSPORTATION KEPT YOU FROM MEETINGS, WORK, OR FROM GETTING THINGS NEEDED FOR DAILY LIVING?: NO

## 2024-11-12 ENCOUNTER — OFFICE VISIT (OUTPATIENT)
Dept: FAMILY MEDICINE CLINIC | Age: 54
End: 2024-11-12

## 2024-11-12 VITALS
DIASTOLIC BLOOD PRESSURE: 84 MMHG | RESPIRATION RATE: 16 BRPM | SYSTOLIC BLOOD PRESSURE: 124 MMHG | HEART RATE: 96 BPM | WEIGHT: 241.4 LBS | HEIGHT: 69 IN | BODY MASS INDEX: 35.76 KG/M2

## 2024-11-12 DIAGNOSIS — I10 PRIMARY HYPERTENSION: Primary | ICD-10-CM

## 2024-11-12 DIAGNOSIS — I48.91 ATRIAL FIBRILLATION, UNSPECIFIED TYPE (HCC): ICD-10-CM

## 2024-11-12 DIAGNOSIS — I42.9 CARDIOMYOPATHY, UNSPECIFIED TYPE (HCC): ICD-10-CM

## 2024-11-12 DIAGNOSIS — E66.9 OBESITY (BMI 30-39.9): ICD-10-CM

## 2024-11-12 RX ORDER — TIRZEPATIDE 2.5 MG/.5ML
2.5 INJECTION, SOLUTION SUBCUTANEOUS WEEKLY
Qty: 2 ML | Refills: 0 | Status: SHIPPED | OUTPATIENT
Start: 2024-11-12

## 2024-11-12 ASSESSMENT — ENCOUNTER SYMPTOMS
RESPIRATORY NEGATIVE: 1
GASTROINTESTINAL NEGATIVE: 1

## 2024-11-12 NOTE — PROGRESS NOTES
Manuel Irby (:  1970) is a 54 y.o. male,Established patient, here for evaluation of the following chief complaint(s):  Weight Management          Subjective   SUBJECTIVE/OBJECTIVE:  HPI  Chief Complaint   Patient presents with    Weight Management     Pt presents to discuss weight loss options.  Would like to lose 50 lbs but having a hard time due to his work scheduled.  Wt Readings from Last 3 Encounters:   24 109.5 kg (241 lb 6.4 oz)   10/16/24 105.2 kg (232 lb)   24 105 kg (231 lb 6.4 oz)     BP Readings from Last 3 Encounters:   24 124/84   10/16/24 128/84   24 124/76     Several chronic conditions that would benefit from wt loss.      Patient Active Problem List   Diagnosis    Persistent atrial fibrillation (HCC)    Episodic headache    Cerebrovascular accident (CVA) due to embolism of left middle cerebral artery (HCC)    CHF (NYHA class III, ACC/AHA stage C) (HCC)    MR (mitral regurgitation)    Atrial fibrillation (HCC)    Abdominal pain, right lower quadrant    S/P appendectomy    PAF (paroxysmal atrial fibrillation) ([facility]) S/P pvi 2020 AND NOW RECURRENCE 21 WITH cvr    History of CVA (cerebrovascular accident)    Primary hypertension    Encounter for cardioversion procedure    Atrial fibrillation with rapid ventricular response (HCC)    Chest pain    Cardiomyopathy (HCC)    Major depressive disorder, recurrent, moderate (HCC)       Current Outpatient Medications   Medication Sig Dispense Refill    tirzepatide-weight management (ZEPBOUND) 2.5 MG/0.5ML SOAJ subCUTAneous auto-injector pen Inject 2.5 mg into the skin once a week 2 mL 0    metoprolol succinate (TOPROL XL) 25 MG extended release tablet TAKE 1 TABLET BY MOUTH EVERY DAY 90 tablet 1    atorvastatin (LIPITOR) 10 MG tablet TAKE 1 TABLET BY MOUTH EVERY DAY 90 tablet 1    ASPIRIN LOW DOSE 81 MG EC tablet TAKE 1 TABLET BY MOUTH EVERY DAY 90 tablet 1    mirtazapine (REMERON) 15 MG tablet TAKE 1 TABLET BY MOUTH

## 2024-11-12 NOTE — PATIENT INSTRUCTIONS
[phone]    [facility]:  What they offer: Assist in finding resources to help pay hospital bills.  Phone Number: 1-591.477.7170  Website: https://www.[employer]/patient-resources/financial-assistance    [facility] [employer]:  What they offer: Medical coverage assistance for children, pregnant women, elderly, individuals with disabilities and those looking for long term care and/or in home waiver care.   Phone Number:  [phone]  Website: https://[employer]/services/medical-assistance    [facility] [employer]:  What they offer: Aging and disability resource center, care management/coordination, transportation, wellness and prevention.  Phone Number: [phone]        Utility  [facility] [employer] [facility]:  What they do: Help pay rent, utilities & internet bills.  Phone Number: 617.163.8972  Website: https://[employer].org/emergency-services/rent-assistance      [facility]:  What they do: They offer Utility assistance  Phone Number: 263.325.6855   Website: https://[facility].org/[facility]/[place]/equip-families    [employer]:  What they offer: Temporary cash assistance to families to pay immediate needs while the adults of the families prepare and search for jobs.   Phone Number: 466.350.1823  Website: https://[employer]/services/cash-assistance

## 2024-11-13 ENCOUNTER — CARE COORDINATION (OUTPATIENT)
Dept: CARE COORDINATION | Age: 54
End: 2024-11-13

## 2024-11-13 NOTE — CARE COORDINATION
SW mailed out [employer] pantry list with a note to check to see if fresh fruits and veggies are available. Also sent out [place] List of local resources to pt.

## 2024-11-14 ENCOUNTER — TELEPHONE (OUTPATIENT)
Dept: FAMILY MEDICINE CLINIC | Age: 54
End: 2024-11-14

## 2024-11-19 RX ORDER — SEMAGLUTIDE 0.25 MG/.5ML
0.25 INJECTION, SOLUTION SUBCUTANEOUS
Qty: 2 ML | Refills: 0 | Status: SHIPPED | OUTPATIENT
Start: 2024-11-19

## 2024-11-19 NOTE — TELEPHONE ENCOUNTER
Denied today by [facility] 2017  Your PA request has been denied. Additional information will be provided in the denial communication.    Denial scanned in media.  Please advise

## 2024-11-19 NOTE — TELEPHONE ENCOUNTER
Additional questions answered and last OV uploaded through covermymeds and submitted--pending review.

## 2025-02-24 ENCOUNTER — TELEPHONE (OUTPATIENT)
Dept: CARDIOLOGY CLINIC | Age: 55
End: 2025-02-24

## 2025-02-24 NOTE — TELEPHONE ENCOUNTER
Pt last seen by Dr. Martin 24.  Pt is needing DOT clearance.    Manuel MARTINO [facility] Clinical Staff (supporting Mukesh Martin MD)12 minutes ago (9:26 AM)       Hello , can I please get another note saying I’m able to drive. My DOT medical card is about to  on 3-1-25 .

## 2025-02-24 NOTE — TELEPHONE ENCOUNTER
Letter out for Dr. Martin to sign.  Message sent to patient asking if letter needs faxed or picked up?

## 2025-03-10 NOTE — PROGRESS NOTES
[facility] [place] [facility]  [facility] CARDIOLOGY  [address]  [address]  [place] [facility] [address]  Dept: [phone]  Dept Fax: [phone]  Loc: [phone]    Visit Date: 3/11/2025  Mr. Irby is a 54 y.o. male who presented for:  CHF   Atrial fibrillation   HPI:   Manuel Irby is a pleasant 54 y.o. male who  has a past medical history of Atrial fibrillation (HCC), Cerebral artery occlusion with cerebral infarction (HCC), CHF (NYHA class III, ACC/AHA stage C) (HCC), Hyperlipidemia, and Hypertension.  Has h/o paroxysmal atrial fibrillation and prior atrial fibrillation ablation (PVI) and prior cardioversion (most recently on 8/2024). Patient was recently admitted to [facility]. Echocardiogram 8/2024 revealed an EF of 25-30%. Echo 9/2024 revealed an EF of 40-45%. LHC 8/2024 revealed nonobstructive CAD. Patient denies chest pain, shortness of breath, dyspnea on exertion. He exercises 30 minutes per day.       Current Outpatient Medications:     Semaglutide-Weight Management (WEGOVY) 0.25 MG/0.5ML SOAJ SC injection, Inject 0.25 mg into the skin every 7 days, Disp: 2 mL, Rfl: 0    metoprolol succinate (TOPROL XL) 25 MG extended release tablet, TAKE 1 TABLET BY MOUTH EVERY DAY, Disp: 90 tablet, Rfl: 1    atorvastatin (LIPITOR) 10 MG tablet, TAKE 1 TABLET BY MOUTH EVERY DAY, Disp: 90 tablet, Rfl: 1    ASPIRIN LOW DOSE 81 MG EC tablet, TAKE 1 TABLET BY MOUTH EVERY DAY, Disp: 90 tablet, Rfl: 1    mirtazapine (REMERON) 15 MG tablet, TAKE 1 TABLET BY MOUTH EVERY DAY AT NIGHT, Disp: 90 tablet, Rfl: 1    spironolactone (ALDACTONE) 25 MG tablet, TAKE 1 TABLET BY MOUTH EVERY DAY, Disp: 90 tablet, Rfl: 3    dapagliflozin (FARXIGA) 10 MG tablet, Take 1 tablet by mouth every morning, Disp: 90 tablet, Rfl: 3    sacubitril-valsartan (ENTRESTO) 24-26 MG per tablet, Take 1 tablet by mouth 2 times daily, Disp: 60 tablet, Rfl: 3    rivaroxaban (XARELTO) 20 MG TABS tablet, Take 1 tablet by mouth daily (with

## 2025-03-11 ENCOUNTER — OFFICE VISIT (OUTPATIENT)
Dept: CARDIOLOGY CLINIC | Age: 55
End: 2025-03-11
Payer: COMMERCIAL

## 2025-03-11 VITALS
DIASTOLIC BLOOD PRESSURE: 88 MMHG | HEIGHT: 69 IN | BODY MASS INDEX: 35.99 KG/M2 | WEIGHT: 243 LBS | HEART RATE: 74 BPM | SYSTOLIC BLOOD PRESSURE: 132 MMHG

## 2025-03-11 DIAGNOSIS — E78.5 HYPERLIPIDEMIA LDL GOAL <130: ICD-10-CM

## 2025-03-11 DIAGNOSIS — F41.9 ANXIETY: ICD-10-CM

## 2025-03-11 DIAGNOSIS — I10 PRIMARY HYPERTENSION: ICD-10-CM

## 2025-03-11 PROCEDURE — 99214 OFFICE O/P EST MOD 30 MIN: CPT | Performed by: INTERNAL MEDICINE

## 2025-03-11 PROCEDURE — G8427 DOCREV CUR MEDS BY ELIG CLIN: HCPCS | Performed by: INTERNAL MEDICINE

## 2025-03-11 PROCEDURE — G8417 CALC BMI ABV UP PARAM F/U: HCPCS | Performed by: INTERNAL MEDICINE

## 2025-03-11 PROCEDURE — 3075F SYST BP GE 130 - 139MM HG: CPT | Performed by: INTERNAL MEDICINE

## 2025-03-11 PROCEDURE — 3079F DIAST BP 80-89 MM HG: CPT | Performed by: INTERNAL MEDICINE

## 2025-03-11 PROCEDURE — 1036F TOBACCO NON-USER: CPT | Performed by: INTERNAL MEDICINE

## 2025-03-11 PROCEDURE — 3017F COLORECTAL CA SCREEN DOC REV: CPT | Performed by: INTERNAL MEDICINE

## 2025-03-11 RX ORDER — METOPROLOL SUCCINATE 50 MG/1
50 TABLET, EXTENDED RELEASE ORAL DAILY
Qty: 30 TABLET | Refills: 4 | Status: SHIPPED | OUTPATIENT
Start: 2025-03-11

## 2025-03-11 NOTE — PROGRESS NOTES
Pt here for a 1 yr f/u - stress test    EKG done 9-24-24    Pt denies all cardiac symptoms     Pt is needing DOT form filled out.

## 2025-04-02 ENCOUNTER — OFFICE VISIT (OUTPATIENT)
Dept: SURGERY | Age: 55
End: 2025-04-02
Payer: COMMERCIAL

## 2025-04-02 VITALS
WEIGHT: 251 LBS | OXYGEN SATURATION: 97 % | TEMPERATURE: 97.6 F | SYSTOLIC BLOOD PRESSURE: 136 MMHG | HEART RATE: 84 BPM | HEIGHT: 69 IN | BODY MASS INDEX: 37.18 KG/M2 | RESPIRATION RATE: 18 BRPM | DIASTOLIC BLOOD PRESSURE: 84 MMHG

## 2025-04-02 DIAGNOSIS — K40.90 LEFT INGUINAL HERNIA: Primary | ICD-10-CM

## 2025-04-02 DIAGNOSIS — K40.90 RIGHT INGUINAL HERNIA: ICD-10-CM

## 2025-04-02 PROCEDURE — G8427 DOCREV CUR MEDS BY ELIG CLIN: HCPCS | Performed by: SURGERY

## 2025-04-02 PROCEDURE — 3017F COLORECTAL CA SCREEN DOC REV: CPT | Performed by: SURGERY

## 2025-04-02 PROCEDURE — 1036F TOBACCO NON-USER: CPT | Performed by: SURGERY

## 2025-04-02 PROCEDURE — G8417 CALC BMI ABV UP PARAM F/U: HCPCS | Performed by: SURGERY

## 2025-04-02 PROCEDURE — 3075F SYST BP GE 130 - 139MM HG: CPT | Performed by: SURGERY

## 2025-04-02 PROCEDURE — 99203 OFFICE O/P NEW LOW 30 MIN: CPT | Performed by: SURGERY

## 2025-04-02 PROCEDURE — 3079F DIAST BP 80-89 MM HG: CPT | Performed by: SURGERY

## 2025-04-03 ASSESSMENT — ENCOUNTER SYMPTOMS
EYE REDNESS: 0
COUGH: 0
SORE THROAT: 0
EYE PAIN: 0
VOICE CHANGE: 0
SHORTNESS OF BREATH: 0
TROUBLE SWALLOWING: 0
STRIDOR: 0
ANAL BLEEDING: 0
CHOKING: 0
CHEST TIGHTNESS: 0
PHOTOPHOBIA: 0
VOMITING: 0
RHINORRHEA: 0
DIARRHEA: 0
ALLERGIC/IMMUNOLOGIC NEGATIVE: 1
FACIAL SWELLING: 0
ABDOMINAL DISTENTION: 0
EYE DISCHARGE: 0
RECTAL PAIN: 0
WHEEZING: 0
NAUSEA: 0
ABDOMINAL PAIN: 0
BACK PAIN: 0
CONSTIPATION: 0
COLOR CHANGE: 0
BLOOD IN STOOL: 0
APNEA: 0
EYE ITCHING: 0
SINUS PRESSURE: 0

## 2025-04-03 NOTE — PROGRESS NOTES
Manuel Irby (:  1970)     ASSESSMENT:  1.  Left inguinal hernia  2.  Small right inguinal hernia    PLAN:  1.  Long discussion with patient about the pros and cons of surgical intervention versus conservative management of his left inguinal hernia.  Robotic, laparoscopic and open techniques discussed.  Pros and cons of mesh insertion discussed.  All questions answered.  He states he would like to proceed with repair but is planning to most likely wait until  when he has more vacation time unless something changes in regards to his symptoms.  I am in agreement with that as his hernia is fairly asymptomatic at this time.  2.  Restrictions discussed with patient.  All questions answered.  3.  No issues from my standpoint in regards to the hernia and him continuing to work  4.  Follow-up when wishing to proceed with repair  5.  Signs and symptoms reviewed with patient that would be concerning and need him to return to office for re-evaluation.  Patient states He will call if He has questions or concerns.    SUBJECTIVE/OBJECTIVE:    Chief Complaint   Patient presents with    Surgical Consult     NP ref by [facility] - Left inguinal hernia     HPI  Manuel is a 54-year-old male who presents for evaluation of a left inguinal hernia.  He states he has had it for more than 10 years.  Over time it has gradually increased slightly in size.  Only gives him mild discomfort usually when he is very active or such as recently when he had a cold and did a lot of coughing.  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  Denies any urinary complaints.  Normal bowel function.  No hematochezia or melena.  No generalized abdominal pain.  Tolerating diet.  No chest pain or shortness of breath.  Currently on Xarelto secondary to atrial fibrillation.  Denies any major abdominal surgeries in the past.    Review of Systems   Constitutional:  Negative for activity change,

## 2025-04-21 ENCOUNTER — TELEPHONE (OUTPATIENT)
Dept: CARDIOLOGY CLINIC | Age: 55
End: 2025-04-21

## 2025-04-24 ENCOUNTER — COMMUNITY OUTREACH (OUTPATIENT)
Dept: FAMILY MEDICINE CLINIC | Age: 55
End: 2025-04-24

## 2025-05-01 RX ORDER — ATORVASTATIN CALCIUM 10 MG/1
10 TABLET, FILM COATED ORAL DAILY
Qty: 90 TABLET | Refills: 1 | Status: SHIPPED | OUTPATIENT
Start: 2025-05-01

## 2025-05-01 RX ORDER — ASPIRIN 81 MG/1
81 TABLET, COATED ORAL DAILY
Qty: 90 TABLET | Refills: 1 | Status: SHIPPED | OUTPATIENT
Start: 2025-05-01

## 2025-05-01 RX ORDER — MIRTAZAPINE 15 MG/1
15 TABLET, FILM COATED ORAL NIGHTLY
Qty: 90 TABLET | Refills: 1 | Status: SHIPPED | OUTPATIENT
Start: 2025-05-01

## 2025-06-16 RX ORDER — METOPROLOL SUCCINATE 50 MG/1
50 TABLET, EXTENDED RELEASE ORAL DAILY
Qty: 90 TABLET | Refills: 2 | Status: SHIPPED | OUTPATIENT
Start: 2025-06-16

## (undated) DEVICE — KIT MFLD ISOLATN NACL CNTRST PRT TBNG SPIK W/ PRSS TRNSDUC

## (undated) DEVICE — CATHETER 5FR CORDIS PIG 145DEG 110CM

## (undated) DEVICE — KIT ANGIO W/ AT P65 PREM HND CTRL FOR CNTRST DEL ANGIOTOUCH

## (undated) DEVICE — TIBURON NEONATAL DRAPE: Brand: CONVERTORS

## (undated) DEVICE — CATHETER DIAG 5FR L100CM LUMN ID0.047IN JL3.5 CRV 0 SIDE H

## (undated) DEVICE — BAND COMPR L24CM REG CLR PLAS HEMSTAT EXT HK AND LOOP RETEN

## (undated) DEVICE — GLIDESHEATH SLENDER NITINOL HYDROPHILIC COATED INTRODUCER SHEATH: Brand: GLIDESHEATH SLENDER

## (undated) DEVICE — 260 CM J TIP WIRE .035

## (undated) DEVICE — DRAPE KIT RAMAPR RADIATION SHIELD

## (undated) DEVICE — MEDTRONIC JR4.0 DIAGNOSTIC CATHETER

## (undated) DEVICE — Z DUPLICATE USE 2431315 SET INSUF TBNG HI FLO W/ SMK EVAC FOR PNEUMOCLEAR